# Patient Record
Sex: MALE | Race: WHITE | Employment: OTHER | URBAN - METROPOLITAN AREA
[De-identification: names, ages, dates, MRNs, and addresses within clinical notes are randomized per-mention and may not be internally consistent; named-entity substitution may affect disease eponyms.]

---

## 2021-11-15 ENCOUNTER — HOSPITAL ENCOUNTER (INPATIENT)
Age: 81
LOS: 4 days | Discharge: NURSING, REHAB OR LONG-TERM CARE | DRG: 280 | End: 2021-11-19
Attending: EMERGENCY MEDICINE | Admitting: FAMILY MEDICINE
Payer: MEDICARE

## 2021-11-15 ENCOUNTER — APPOINTMENT (OUTPATIENT)
Dept: GENERAL RADIOLOGY | Age: 81
DRG: 280 | End: 2021-11-15
Attending: EMERGENCY MEDICINE
Payer: MEDICARE

## 2021-11-15 DIAGNOSIS — I50.810 RIGHT-SIDED CONGESTIVE HEART FAILURE, UNSPECIFIED HF CHRONICITY (HCC): Primary | ICD-10-CM

## 2021-11-15 DIAGNOSIS — J18.9 PNEUMONIA DUE TO INFECTIOUS ORGANISM, UNSPECIFIED LATERALITY, UNSPECIFIED PART OF LUNG: ICD-10-CM

## 2021-11-15 PROBLEM — I50.9 CHF (CONGESTIVE HEART FAILURE) (HCC): Status: ACTIVE | Noted: 2021-11-15

## 2021-11-15 LAB
ALBUMIN SERPL-MCNC: 3.4 G/DL (ref 3.5–5)
ALBUMIN/GLOB SERPL: 1 {RATIO} (ref 1.1–2.2)
ALP SERPL-CCNC: 85 U/L (ref 45–117)
ALT SERPL-CCNC: 14 U/L (ref 12–78)
ANION GAP SERPL CALC-SCNC: 6 MMOL/L (ref 5–15)
ANION GAP SERPL CALC-SCNC: 6 MMOL/L (ref 5–15)
APPEARANCE UR: CLEAR
AST SERPL W P-5'-P-CCNC: 11 U/L (ref 15–37)
ATRIAL RATE: 108 BPM
BACTERIA URNS QL MICRO: NEGATIVE /HPF
BASOPHILS # BLD: 0 K/UL (ref 0–0.1)
BASOPHILS NFR BLD: 0 % (ref 0–1)
BILIRUB SERPL-MCNC: 0.5 MG/DL (ref 0.2–1)
BILIRUB UR QL: NEGATIVE
BNP SERPL-MCNC: 3954 PG/ML
BUN SERPL-MCNC: 13 MG/DL (ref 6–20)
BUN SERPL-MCNC: 15 MG/DL (ref 6–20)
BUN/CREAT SERPL: 11 (ref 12–20)
BUN/CREAT SERPL: 14 (ref 12–20)
CA-I BLD-MCNC: 8.6 MG/DL (ref 8.5–10.1)
CA-I BLD-MCNC: 8.7 MG/DL (ref 8.5–10.1)
CALCULATED R AXIS, ECG10: 67 DEGREES
CALCULATED T AXIS, ECG11: 54 DEGREES
CHLORIDE SERPL-SCNC: 105 MMOL/L (ref 97–108)
CHLORIDE SERPL-SCNC: 113 MMOL/L (ref 97–108)
CK SERPL-CCNC: 59 NG/ML (ref 39–308)
CO2 SERPL-SCNC: 25 MMOL/L (ref 21–32)
CO2 SERPL-SCNC: 28 MMOL/L (ref 21–32)
COLOR UR: ABNORMAL
COVID-19 RAPID TEST, COVR: NOT DETECTED
CREAT SERPL-MCNC: 1.05 MG/DL (ref 0.7–1.3)
CREAT SERPL-MCNC: 1.17 MG/DL (ref 0.7–1.3)
DIAGNOSIS, 93000: NORMAL
DIFFERENTIAL METHOD BLD: ABNORMAL
EOSINOPHIL # BLD: 0 K/UL (ref 0–0.4)
EOSINOPHIL NFR BLD: 0 % (ref 0–7)
ERYTHROCYTE [DISTWIDTH] IN BLOOD BY AUTOMATED COUNT: 19.8 % (ref 11.5–14.5)
GLOBULIN SER CALC-MCNC: 3.4 G/DL (ref 2–4)
GLUCOSE SERPL-MCNC: 128 MG/DL (ref 65–100)
GLUCOSE SERPL-MCNC: 202 MG/DL (ref 65–100)
GLUCOSE UR STRIP.AUTO-MCNC: NEGATIVE MG/DL
HCT VFR BLD AUTO: 26 % (ref 36.6–50.3)
HGB BLD-MCNC: 7.1 G/DL (ref 12.1–17)
HGB UR QL STRIP: NEGATIVE
HYALINE CASTS URNS QL MICRO: ABNORMAL /LPF (ref 0–5)
IMM GRANULOCYTES # BLD AUTO: 0.1 K/UL (ref 0–0.04)
IMM GRANULOCYTES NFR BLD AUTO: 1 % (ref 0–0.5)
KETONES UR QL STRIP.AUTO: NEGATIVE MG/DL
LACTATE SERPL-SCNC: 1.4 MMOL/L (ref 0.4–2)
LEUKOCYTE ESTERASE UR QL STRIP.AUTO: NEGATIVE
LYMPHOCYTES # BLD: 0.5 K/UL (ref 0.8–3.5)
LYMPHOCYTES NFR BLD: 4 % (ref 12–49)
MCH RBC QN AUTO: 19.5 PG (ref 26–34)
MCHC RBC AUTO-ENTMCNC: 27.3 G/DL (ref 30–36.5)
MCV RBC AUTO: 71.2 FL (ref 80–99)
MONOCYTES # BLD: 1 K/UL (ref 0–1)
MONOCYTES NFR BLD: 7 % (ref 5–13)
NEUTS SEG # BLD: 12 K/UL (ref 1.8–8)
NEUTS SEG NFR BLD: 88 % (ref 32–75)
NITRITE UR QL STRIP.AUTO: NEGATIVE
NRBC # BLD: 0 K/UL (ref 0–0.01)
NRBC BLD-RTO: 0 PER 100 WBC
PH UR STRIP: 5 [PH] (ref 5–8)
PLATELET # BLD AUTO: 405 K/UL (ref 150–400)
PMV BLD AUTO: 11.6 FL (ref 8.9–12.9)
POTASSIUM SERPL-SCNC: 3.5 MMOL/L (ref 3.5–5.1)
POTASSIUM SERPL-SCNC: 4.2 MMOL/L (ref 3.5–5.1)
PROT SERPL-MCNC: 6.8 G/DL (ref 6.4–8.2)
PROT UR STRIP-MCNC: 30 MG/DL
Q-T INTERVAL, ECG07: 308 MS
QRS DURATION, ECG06: 90 MS
QTC CALCULATION (BEZET), ECG08: 395 MS
RBC # BLD AUTO: 3.65 M/UL (ref 4.1–5.7)
RBC #/AREA URNS HPF: ABNORMAL /HPF (ref 0–5)
SODIUM SERPL-SCNC: 139 MMOL/L (ref 136–145)
SODIUM SERPL-SCNC: 144 MMOL/L (ref 136–145)
SP GR UR REFRACTOMETRY: 1.02 (ref 1–1.03)
SPECIMEN SOURCE: NORMAL
TROPONIN-HIGH SENSITIVITY: 147 NG/L (ref 0–76)
UA: UC IF INDICATED,UAUC: ABNORMAL
UROBILINOGEN UR QL STRIP.AUTO: 2 EU/DL (ref 0.1–1)
VENTRICULAR RATE, ECG03: 99 BPM
WBC # BLD AUTO: 13.6 K/UL (ref 4.1–11.1)
WBC URNS QL MICRO: ABNORMAL /HPF (ref 0–4)

## 2021-11-15 PROCEDURE — 71045 X-RAY EXAM CHEST 1 VIEW: CPT

## 2021-11-15 PROCEDURE — 80048 BASIC METABOLIC PNL TOTAL CA: CPT

## 2021-11-15 PROCEDURE — 99284 EMERGENCY DEPT VISIT MOD MDM: CPT

## 2021-11-15 PROCEDURE — 87040 BLOOD CULTURE FOR BACTERIA: CPT

## 2021-11-15 PROCEDURE — 93005 ELECTROCARDIOGRAM TRACING: CPT

## 2021-11-15 PROCEDURE — 94761 N-INVAS EAR/PLS OXIMETRY MLT: CPT

## 2021-11-15 PROCEDURE — 94640 AIRWAY INHALATION TREATMENT: CPT

## 2021-11-15 PROCEDURE — 74011250637 HC RX REV CODE- 250/637: Performed by: INTERNAL MEDICINE

## 2021-11-15 PROCEDURE — 80053 COMPREHEN METABOLIC PANEL: CPT

## 2021-11-15 PROCEDURE — 81001 URINALYSIS AUTO W/SCOPE: CPT

## 2021-11-15 PROCEDURE — 65270000029 HC RM PRIVATE

## 2021-11-15 PROCEDURE — 83605 ASSAY OF LACTIC ACID: CPT

## 2021-11-15 PROCEDURE — 74011250636 HC RX REV CODE- 250/636: Performed by: FAMILY MEDICINE

## 2021-11-15 PROCEDURE — 84484 ASSAY OF TROPONIN QUANT: CPT

## 2021-11-15 PROCEDURE — 36415 COLL VENOUS BLD VENIPUNCTURE: CPT

## 2021-11-15 PROCEDURE — 74011000250 HC RX REV CODE- 250: Performed by: FAMILY MEDICINE

## 2021-11-15 PROCEDURE — 74011000258 HC RX REV CODE- 258: Performed by: FAMILY MEDICINE

## 2021-11-15 PROCEDURE — 87635 SARS-COV-2 COVID-19 AMP PRB: CPT

## 2021-11-15 PROCEDURE — 85025 COMPLETE CBC W/AUTO DIFF WBC: CPT

## 2021-11-15 PROCEDURE — 74011250637 HC RX REV CODE- 250/637: Performed by: FAMILY MEDICINE

## 2021-11-15 PROCEDURE — 82550 ASSAY OF CK (CPK): CPT

## 2021-11-15 PROCEDURE — 83880 ASSAY OF NATRIURETIC PEPTIDE: CPT

## 2021-11-15 RX ORDER — IPRATROPIUM BROMIDE AND ALBUTEROL SULFATE 2.5; .5 MG/3ML; MG/3ML
3 SOLUTION RESPIRATORY (INHALATION)
Status: DISCONTINUED | OUTPATIENT
Start: 2021-11-15 | End: 2021-11-15 | Stop reason: CLARIF

## 2021-11-15 RX ORDER — POLYETHYLENE GLYCOL 3350 17 G/17G
17 POWDER, FOR SOLUTION ORAL DAILY PRN
Status: DISCONTINUED | OUTPATIENT
Start: 2021-11-15 | End: 2021-11-19 | Stop reason: HOSPADM

## 2021-11-15 RX ORDER — OMEPRAZOLE 20 MG/1
20 CAPSULE, DELAYED RELEASE ORAL DAILY
COMMUNITY
Start: 2021-09-03 | End: 2021-11-19

## 2021-11-15 RX ORDER — ONDANSETRON 2 MG/ML
4 INJECTION INTRAMUSCULAR; INTRAVENOUS
Status: DISCONTINUED | OUTPATIENT
Start: 2021-11-15 | End: 2021-11-19 | Stop reason: HOSPADM

## 2021-11-15 RX ORDER — FUROSEMIDE 10 MG/ML
40 INJECTION INTRAMUSCULAR; INTRAVENOUS 3 TIMES DAILY
Status: DISCONTINUED | OUTPATIENT
Start: 2021-11-15 | End: 2021-11-15

## 2021-11-15 RX ORDER — FUROSEMIDE 10 MG/ML
40 INJECTION INTRAMUSCULAR; INTRAVENOUS
Status: COMPLETED | OUTPATIENT
Start: 2021-11-15 | End: 2021-11-15

## 2021-11-15 RX ORDER — BUDESONIDE AND FORMOTEROL FUMARATE DIHYDRATE 160; 4.5 UG/1; UG/1
2 AEROSOL RESPIRATORY (INHALATION) 2 TIMES DAILY
Status: DISCONTINUED | OUTPATIENT
Start: 2021-11-15 | End: 2021-11-16

## 2021-11-15 RX ORDER — ACETAMINOPHEN 325 MG/1
650 TABLET ORAL
Status: DISCONTINUED | OUTPATIENT
Start: 2021-11-15 | End: 2021-11-19 | Stop reason: HOSPADM

## 2021-11-15 RX ORDER — TAMSULOSIN HYDROCHLORIDE 0.4 MG/1
1 CAPSULE ORAL DAILY
COMMUNITY
Start: 2021-09-03 | End: 2021-11-19

## 2021-11-15 RX ORDER — TRAZODONE HYDROCHLORIDE 50 MG/1
25 TABLET ORAL
Status: DISCONTINUED | OUTPATIENT
Start: 2021-11-15 | End: 2021-11-19 | Stop reason: HOSPADM

## 2021-11-15 RX ORDER — METOPROLOL SUCCINATE 25 MG/1
25 TABLET, EXTENDED RELEASE ORAL DAILY
Status: DISCONTINUED | OUTPATIENT
Start: 2021-11-16 | End: 2021-11-17

## 2021-11-15 RX ORDER — ONDANSETRON 4 MG/1
4 TABLET, ORALLY DISINTEGRATING ORAL
Status: DISCONTINUED | OUTPATIENT
Start: 2021-11-15 | End: 2021-11-19 | Stop reason: HOSPADM

## 2021-11-15 RX ORDER — GUAIFENESIN 100 MG/5ML
81 LIQUID (ML) ORAL DAILY
Status: DISCONTINUED | OUTPATIENT
Start: 2021-11-16 | End: 2021-11-19 | Stop reason: HOSPADM

## 2021-11-15 RX ORDER — AMLODIPINE BESYLATE 10 MG/1
10 TABLET ORAL DAILY
COMMUNITY
Start: 2021-09-03 | End: 2021-11-19

## 2021-11-15 RX ORDER — CLOPIDOGREL BISULFATE 75 MG/1
75 TABLET ORAL DAILY
COMMUNITY
Start: 2021-09-03 | End: 2021-11-19

## 2021-11-15 RX ORDER — ASPIRIN 325 MG
325 TABLET ORAL
Status: COMPLETED | OUTPATIENT
Start: 2021-11-15 | End: 2021-11-15

## 2021-11-15 RX ORDER — TRAZODONE HYDROCHLORIDE 50 MG/1
1 TABLET ORAL
COMMUNITY
Start: 2021-09-03

## 2021-11-15 RX ORDER — LISINOPRIL 20 MG/1
20 TABLET ORAL DAILY
COMMUNITY
Start: 2021-09-03 | End: 2021-11-19

## 2021-11-15 RX ORDER — FUROSEMIDE 10 MG/ML
40 INJECTION INTRAMUSCULAR; INTRAVENOUS 2 TIMES DAILY
Status: DISCONTINUED | OUTPATIENT
Start: 2021-11-15 | End: 2021-11-17

## 2021-11-15 RX ORDER — ACETAMINOPHEN 650 MG/1
650 SUPPOSITORY RECTAL
Status: DISCONTINUED | OUTPATIENT
Start: 2021-11-15 | End: 2021-11-19 | Stop reason: HOSPADM

## 2021-11-15 RX ORDER — ALBUTEROL SULFATE 90 UG/1
2 AEROSOL, METERED RESPIRATORY (INHALATION)
Status: DISCONTINUED | OUTPATIENT
Start: 2021-11-15 | End: 2021-11-19 | Stop reason: HOSPADM

## 2021-11-15 RX ADMIN — FUROSEMIDE 40 MG: 40 INJECTION, SOLUTION INTRAMUSCULAR; INTRAVENOUS at 12:40

## 2021-11-15 RX ADMIN — METHYLPREDNISOLONE SODIUM SUCCINATE 40 MG: 40 INJECTION, POWDER, FOR SOLUTION INTRAMUSCULAR; INTRAVENOUS at 12:50

## 2021-11-15 RX ADMIN — TRAZODONE HYDROCHLORIDE 25 MG: 50 TABLET ORAL at 22:17

## 2021-11-15 RX ADMIN — IPRATROPIUM BROMIDE AND ALBUTEROL SULFATE 3 ML: .5; 2.5 SOLUTION RESPIRATORY (INHALATION) at 15:39

## 2021-11-15 RX ADMIN — PIPERACILLIN SODIUM AND TAZOBACTAM SODIUM 3.38 G: 3; .375 INJECTION, POWDER, LYOPHILIZED, FOR SOLUTION INTRAVENOUS at 12:40

## 2021-11-15 RX ADMIN — NITROGLYCERIN 1 INCH: 20 OINTMENT TOPICAL at 12:41

## 2021-11-15 RX ADMIN — CEFTRIAXONE 1 G: 1 INJECTION, POWDER, FOR SOLUTION INTRAMUSCULAR; INTRAVENOUS at 12:40

## 2021-11-15 RX ADMIN — BUDESONIDE AND FORMOTEROL FUMARATE DIHYDRATE 2 PUFF: 160; 4.5 AEROSOL RESPIRATORY (INHALATION) at 21:00

## 2021-11-15 RX ADMIN — FUROSEMIDE 40 MG: 10 INJECTION, SOLUTION INTRAVENOUS at 22:18

## 2021-11-15 RX ADMIN — BUDESONIDE AND FORMOTEROL FUMARATE DIHYDRATE 2 PUFF: 160; 4.5 AEROSOL RESPIRATORY (INHALATION) at 15:39

## 2021-11-15 RX ADMIN — PIPERACILLIN SODIUM AND TAZOBACTAM SODIUM 3.38 G: 3; .375 INJECTION, POWDER, LYOPHILIZED, FOR SOLUTION INTRAVENOUS at 22:17

## 2021-11-15 RX ADMIN — ASPIRIN 325 MG ORAL TABLET 325 MG: 325 PILL ORAL at 12:41

## 2021-11-15 NOTE — H&P
History and Physical    Subjective:     Shin Levy, [de-identified] y.o. male with a past medical history significant weak heart presents to the ED with chief complaint of Shortness of Breath  . 81-year-old male has a known weak heart. Does not follow with cardiology. Says he has been having worsening shortness of breath has been progressive over the last 1 to 2 weeks. Occasional coughing which she normally has. Unsure about any fevers. Is feeling weaker. No new swelling. No nausea vomiting diarrhea. Some breathing that is been bothering him. Not related to position or exertion. Social History     Tobacco Use    Smoking status: 2packs per day since he was 7     Smokeless tobacco: Not on file   Substance Use Topics    Alcohol use: 40oz beer daily, sometimes liquor too        Prior to Admission medications    Not on File     No Known Allergies       Objective: Intake and Output:    No intake/output data recorded. No intake/output data recorded. Physical Exam:   Visit Vitals  BP (!) 151/66 (BP 1 Location: Left upper arm, BP Patient Position: At rest)   Pulse (!) 104   Temp 97.8 °F (36.6 °C)   Resp 25   Ht 5' 11.5\" (1.816 m)   Wt 74.8 kg (165 lb)   SpO2 100%   BMI 22.69 kg/m²     General:  Alert, cooperative, slight difficult breathing, appears stated age. Head:  Normocephalic, without obvious abnormality, atraumatic. Eyes:  Conjunctivae/corneas clear. PERRL, EOMs intact. Fundi benign   Ears:  Normal TMs and external ear canals both ears. Nose: Nares normal. Septum midline. Mucosa normal. No drainage or sinus tenderness. Throat: Lips, mucosa, and tongue normal. Teeth and gums normal.   Neck: Supple, symmetrical, trachea midline, no adenopathy, thyroid: no enlargement/tenderness/nodules, no carotid bruit and no JVD. Back:   Symmetric, no curvature. ROM normal. No CVA tenderness. Lungs:   Rales and rhonchi, coughing on exam    Chest wall:  No tenderness or deformity.    Heart: Tachycardic and normal rhythm, S1, S2 normal, no murmur, click, rub or gallop. Abdomen:   Soft, non-tender. Bowel sounds normal. No masses,  No organomegaly. Genitalia:  Normal male without lesion, discharge or tenderness. Rectal:  Normal tone, normal prostate, no masses or tenderness  Guaiac negative stool. Extremities: Extremities normal, atraumatic, no cyanosis or edema. Pulses: 2+ and symmetric all extremities. Skin: Skin color, texture, turgor normal. No rashes or lesions   Lymph nodes: Cervical, supraclavicular, and axillary nodes normal.   Neurologic: CNII-XII intact. Normal strength, sensation and reflexes throughout. ECG:  Atrial fibrillation. Nonspecific T wave abnormality      Data Review:   Recent Results (from the past 24 hour(s))   EKG, 12 LEAD, INITIAL    Collection Time: 11/15/21  9:33 AM   Result Value Ref Range    Ventricular Rate 99 BPM    Atrial Rate 108 BPM    QRS Duration 90 ms    Q-T Interval 308 ms    QTC Calculation (Bezet) 395 ms    Calculated R Axis 67 degrees    Calculated T Axis 54 degrees    Diagnosis       Atrial fibrillation  Nonspecific T wave abnormality  Abnormal ECG  No previous ECGs available  Confirmed by Kristofer RUIZ MD ([id number]) on 11/15/2021 11:18:07 AM     CBC WITH AUTOMATED DIFF    Collection Time: 11/15/21  9:35 AM   Result Value Ref Range    WBC 13.6 (H) 4.1 - 11.1 K/uL    RBC 3.65 (L) 4.10 - 5.70 M/uL    HGB 7.1 (L) 12.1 - 17.0 g/dL    HCT 26.0 (L) 36.6 - 50.3 %    MCV 71.2 (L) 80.0 - 99.0 FL    MCH 19.5 (L) 26.0 - 34.0 PG    MCHC 27.3 (L) 30.0 - 36.5 g/dL    RDW 19.8 (H) 11.5 - 14.5 %    PLATELET 808 (H) 563 - 400 K/uL    MPV 11.6 8.9 - 12.9 FL    NRBC 0.0 0.0  WBC    ABSOLUTE NRBC 0.00 0.00 - 0.01 K/uL    NEUTROPHILS 88 (H) 32 - 75 %    LYMPHOCYTES 4 (L) 12 - 49 %    MONOCYTES 7 5 - 13 %    EOSINOPHILS 0 0 - 7 %    BASOPHILS 0 0 - 1 %    IMMATURE GRANULOCYTES 1 (H) 0 - 0.5 %    ABS. NEUTROPHILS 12.0 (H) 1.8 - 8.0 K/UL    ABS.  LYMPHOCYTES 0.5 (L) 0.8 - 3.5 K/UL    ABS. MONOCYTES 1.0 0.0 - 1.0 K/UL    ABS. EOSINOPHILS 0.0 0.0 - 0.4 K/UL    ABS. BASOPHILS 0.0 0.0 - 0.1 K/UL    ABS. IMM. GRANS. 0.1 (H) 0.00 - 0.04 K/UL    DF AUTOMATED     METABOLIC PANEL, COMPREHENSIVE    Collection Time: 11/15/21  9:35 AM   Result Value Ref Range    Sodium 144 136 - 145 mmol/L    Potassium 4.2 3.5 - 5.1 mmol/L    Chloride 113 (H) 97 - 108 mmol/L    CO2 25 21 - 32 mmol/L    Anion gap 6 5 - 15 mmol/L    Glucose 128 (H) 65 - 100 mg/dL    BUN 15 6 - 20 mg/dL    Creatinine 1.05 0.70 - 1.30 mg/dL    BUN/Creatinine ratio 14 12 - 20      GFR est AA >60 >60 ml/min/1.73m2    GFR est non-AA >60 >60 ml/min/1.73m2    Calcium 8.7 8.5 - 10.1 mg/dL    Bilirubin, total 0.5 0.2 - 1.0 mg/dL    AST (SGOT) 11 (L) 15 - 37 U/L    ALT (SGPT) 14 12 - 78 U/L    Alk.  phosphatase 85 45 - 117 U/L    Protein, total 6.8 6.4 - 8.2 g/dL    Albumin 3.4 (L) 3.5 - 5.0 g/dL    Globulin 3.4 2.0 - 4.0 g/dL    A-G Ratio 1.0 (L) 1.1 - 2.2     CK W/ REFLX CKMB    Collection Time: 11/15/21  9:35 AM   Result Value Ref Range    CK 59.0 39 - 308 ng/mL   TROPONIN-HIGH SENSITIVITY    Collection Time: 11/15/21  9:35 AM   Result Value Ref Range    Troponin-High Sensitivity 147 (HH) 0 - 76 ng/L   NT-PRO BNP    Collection Time: 11/15/21  9:35 AM   Result Value Ref Range    NT pro-BNP 3,954 (H) <450 pg/mL   URINALYSIS W/ REFLEX CULTURE    Collection Time: 11/15/21  9:35 AM    Specimen: Urine   Result Value Ref Range    Color Yellow/Straw      Appearance Clear Clear      Specific gravity 1.016 1.003 - 1.030      pH (UA) 5.0 5.0 - 8.0      Protein 30 (A) Negative mg/dL    Glucose Negative Negative mg/dL    Ketone Negative Negative mg/dL    Bilirubin Negative Negative      Blood Negative Negative      Urobilinogen 2.0 (H) 0.1 - 1.0 EU/dL    Nitrites Negative Negative      Leukocyte Esterase Negative Negative      WBC 0-4 0 - 4 /hpf    RBC 0-5 0 - 5 /hpf    Bacteria Negative Negative /hpf    UA:UC IF INDICATED Culture not indicated by UA result Culture not indicated by UA result      Hyaline cast 0-2 0 - 5 /lpf   LACTIC ACID    Collection Time: 11/15/21 12:00 PM   Result Value Ref Range    Lactic acid 1.4 0.4 - 2.0 mmol/L       Chest x-ray 1. Bilateral pleural effusions with bilateral airspace disease or edema. 2. Multiple right-sided rib fractures, probably old, recommend follow-up as  warranted. Assessment/plan:   NSTEMI    Congestive heart failure  - CXR:  Bilateral pleural effusions with bilateral airspace disease or edema. Multiple right-sided rib fractures, probably old, recommend follow-up as warranted. PNA due to infectious organism  - elevated WBC  - start on abx     hgb at 7.1   - transfuse if goes below 7    EKG: Atrial fibrillation.  Nonspecific T wave abnormality     HTN    Medications:    Current Facility-Administered Medications:     cefTRIAXone (ROCEPHIN) 1 g in sterile water (preservative free) 10 mL IV syringe, 1 g, IntraVENous, NOW, Wendy Apodaca MD, 1 g at 11/15/21 1240    acetaminophen (TYLENOL) tablet 650 mg, 650 mg, Oral, Q6H PRN **OR** acetaminophen (TYLENOL) suppository 650 mg, 650 mg, Rectal, Q6H PRN, Hitesh Apodaca MD    polyethylene glycol (MIRALAX) packet 17 g, 17 g, Oral, DAILY PRN, Hitesh Apodaca MD    ondansetron (ZOFRAN ODT) tablet 4 mg, 4 mg, Oral, Q8H PRN **OR** ondansetron (ZOFRAN) injection 4 mg, 4 mg, IntraVENous, Q6H PRN, Wendy Apodaca MD    albuterol-ipratropium (DUO-NEB) 2.5 MG-0.5 MG/3 ML, 3 mL, Nebulization, Q6H RT, Hitesh Apodaca MD    methylPREDNISolone (PF) (SOLU-MEDROL) injection 40 mg, 40 mg, IntraVENous, Q6H, Wendy Apodaca MD    piperacillin-tazobactam (ZOSYN) 3.375 g in 0.9% sodium chloride (MBP/ADV) 100 mL MBP, 3.375 g, IntraVENous, Q8H, Wenyd Apodaca MD    budesonide-formoteroL (SYMBICORT) 160-4.5 mcg/actuation HFA inhaler 2 Puff, 2 Puff, Inhalation, BID, Wendy Apodaca MD    furosemide (LASIX) injection 40 mg, 40 mg, IntraVENous, BID, Constanza Gretel Lundborg, MD  No current outpatient medications on file.      Unable to start any anticoagulation due to anemia hemoglobin 7.1  Start on IV Lasix 40 twice daily IV Solu-Medrol nebulizer treatment IV Zosyn cardiology pulmonary consult    Need home medication not available at this time    Order HIWOT

## 2021-11-15 NOTE — CONSULTS
Consult    Patient: Galo Nance MRN: [id number]  SSN: [id number]    YOB: 1940  Age: [de-identified] y.o. Sex: male      Subjective:      Galo Nance is a [de-identified] y.o. male who is being seen for atrial fibrillation. Patient with history of coronary disease status post PCI, dilated cardiomyopathy, atrial fibrillation, alcohol abuse, smoking 2 packs every day. He presented to the emergency room complaining of sharp chest pain, none exertional, nonradiating, without nausea, vomiting or excessive sweating. Anyhow he has been getting more short winded. Denied recent change in his weight or lower extremity swelling. Denied any fever or chills. He has been noticing more palpitation. Occasionally gets dizzy but no passing out. No past medical history on file. No past surgical history on file. No family history on file.   Social History     Tobacco Use    Smoking status: Not on file    Smokeless tobacco: Not on file   Substance Use Topics    Alcohol use: Not on file      Current Facility-Administered Medications   Medication Dose Route Frequency Provider Last Rate Last Admin    acetaminophen (TYLENOL) tablet 650 mg  650 mg Oral Q6H PRN Neftali Apodaca MD        Or   [facility] acetaminophen (TYLENOL) suppository 650 mg  650 mg Rectal Q6H PRN Neftali Apodaca MD        polyethylene glycol (MIRALAX) packet 17 g  17 g Oral DAILY PRN Neftali Apodaca MD        ondansetron (ZOFRAN ODT) tablet 4 mg  4 mg Oral Q8H PRN Neftali Apodaca MD        Or    ondansetron [facility]) injection 4 mg  4 mg IntraVENous Q6H PRN Neftali Apodaca MD        albuterol-ipratropium (DUO-NEB) 2.5 MG-0.5 MG/3 ML  3 mL Nebulization Q6H RT Wendy Apodaca MD   3 mL at 11/15/21 1539    methylPREDNISolone (PF) (SOLU-MEDROL) injection 40 mg  40 mg IntraVENous Q6H Wendy Apodaca MD   40 mg at 11/15/21 1250    piperacillin-tazobactam (ZOSYN) 3.375 g in 0.9% sodium chloride (MBP/ADV) 100 mL MBP  3.375 g IntraVENous Q8H Constanza David Gibson  mL/hr at 11/15/21 1240 3.375 g at 11/15/21 1240    budesonide-formoteroL (SYMBICORT) 160-4.5 mcg/actuation HFA inhaler 2 Puff  2 Puff Inhalation BID Wendy Apodaca MD   2 Puff at 11/15/21 1539    furosemide (LASIX) injection 40 mg  40 mg IntraVENous BID Romelia Hutchins MD            No Known Allergies    Review of Systems:  A comprehensive review of systems was negative except for that written in the History of Present Illness. Objective:     Vitals:    11/15/21 0922   BP: (!) 151/66   Pulse: (!) 104   Resp: 25   Temp: 97.8 °F (36.6 °C)   SpO2: 100%   Weight: 74.8 kg (165 lb)   Height: 5' 11.5\" (1.816 m)        Physical Exam:  General:  Alert, cooperative, no distress, appears stated age. Eyes:  Conjunctivae/corneas clear. PERRL, EOMs intact. Fundi benign   Ears:  Normal TMs and external ear canals both ears. Nose: Nares normal. Septum midline. Mucosa normal. No drainage or sinus tenderness. Mouth/Throat: Lips, mucosa, and tongue normal. Teeth and gums normal.   Neck: Supple, symmetrical, trachea midline, no adenopathy, thyroid: no enlargment/tenderness/nodules, no carotid bruit and no JVD. Back:   Symmetric, no curvature. ROM normal. No CVA tenderness. Lungs:   Clear to auscultation bilaterally. Heart:   Irregular, variable S1 with normal S2. Abdomen:   Soft, non-tender. Bowel sounds normal. No masses,  No organomegaly. Extremities: Extremities normal, atraumatic, no cyanosis or edema. Pulses: 2+ and symmetric all extremities. Skin: Skin color, texture, turgor normal. No rashes or lesions   Lymph nodes: Cervical, supraclavicular, and axillary nodes normal.   Neurologic: CNII-XII intact. Normal strength, sensation and reflexes throughout.          Assessment:     Hospital Problems  Never Reviewed          Codes Class Noted POA    CHF (congestive heart failure) ([facility] ICD-10-CM: I50.9  ICD-9-CM: 428.0  11/15/2021 Unknown        PNA (pneumonia) ICD-10-CM: J18.9  ICD-9-CM: 562 11/15/2021 Unknown            Patient is an 81-year-old white male with:  1. Atrial fibrillation with rapid ventricular rate  2. Alcohol abuse  3. Nicotine dependence  4. Coronary artery status post PCI  5. Hyperlipidemia  6. Frequent falls  7. Heart failure with reduced ejection fraction  8. Anemia  Plan:     Patient appears to be anemic. His hemoglobin of 7.1. Platelet 862. White count 13.6. His glucose 128, creatinine 1.05, BUN of 15. Troponin were mildly elevated. This is probably secondary to demand ischemia from A. fib with RVR and anemia. We will transfuse 1 unit of blood. CPK was low. BNP was elevated. EKG showed atrial fibrillation with nonspecific ST-T wave changes. We will check fecal for occult blood. Currently on IV Lasix twice a day. We will monitor his ins and outs and daily weight and kidney function closely. I will get ahead and start him on metoprolol for rate control. We will check an echocardiogram  We will have him on aspirin 81 mg daily for history of CAD. Patient was counseled cutting back on his alcohol use. He was counseled regarding the importance of smoking cessation. Thank you  For involving me in Mr. [place] rene. I will follow.   Signed By: Mickie Mahoney MD     November 15, 2021

## 2021-11-15 NOTE — Clinical Note
Status[de-identified] INPATIENT [101]   Type of Bed: Remote Telemetry [29]   Cardiac Monitoring Required?: Yes   Inpatient Hospitalization Certified Necessary for the Following Reasons: 3.  Patient receiving treatment that can only be provided in an inpatient setting (further clarification in H&P documentation)   Admitting Diagnosis: [facility]congestive [facility] failure) [facility] [[id number]]   Admitting Diagnosis: PNA (pneumonia) [[id number]]   Admitting Physician: Freda Mariee [[id number]]   Attending Physician: Freda Mariee [[id number]]   Estimated Length of Stay: 3-4 Midnights   Discharge Plan[de-identified] Home with Office Follow-up

## 2021-11-15 NOTE — CONSULTS
PULMONARY CONSULT  [facility]    Name: Aleisha Gambino MRN: [id number]   : 1940 Hospital: [address] [place] [address]   Date: 11/15/2021  Admission date: 11/15/2021 Hospital Day: 1       HPI:     Hospital Problems  Never Reviewed          Codes Class Noted POA    CHF (congestive heart failure) ([facility] ICD-10-CM: I50.9  ICD-9-CM: 428.0  11/15/2021 Unknown        PNA (pneumonia) ICD-10-CM: J18.9  ICD-9-CM: 486  11/15/2021 Unknown                   [x] High complexity decision making was performed  [x] See my orders for details      Subjective/Initial History:     I was asked by Horace Spatz, MD to see Aleisha Gambino  a [de-identified] y.o.    male in consultation     Excerpts from admission 11/15/2021 or consult notes as follows:   41-year-old male came in because of shortness of breath and dyspnea was found to be in atrial fibrillation, he smokes 2 packs/day has been smoking since the age of 7 denies any history of chest pain but complaining about shortness of breath and dyspnea for the past 1 to 2 weeks x-ray was done which shows bilateral pleural effusion and some infiltrate and history of multiple right-sided rib fracture probably old so admitted and pulmonary consult was called for further evaluation      No Known Allergies     MAR reviewed and pertinent medications noted or modified as needed     Current Facility-Administered Medications   Medication    acetaminophen (TYLENOL) tablet 650 mg    Or    acetaminophen (TYLENOL) suppository 650 mg    polyethylene glycol (MIRALAX) packet 17 g    ondansetron (ZOFRAN ODT) tablet 4 mg    Or    ondansetron (ZOFRAN) injection 4 mg    albuterol-ipratropium (DUO-NEB) 2.5 MG-0.5 MG/3 ML    methylPREDNISolone (PF) (SOLU-MEDROL) injection 40 mg    piperacillin-tazobactam (ZOSYN) 3.375 g in 0.9% sodium chloride (MBP/ADV) 100 mL MBP    budesonide-formoteroL (SYMBICORT) 160-4.5 mcg/actuation HFA inhaler 2 Puff    furosemide (LASIX) injection 40 mg    [START ON 2021] aspirin chewable tablet 81 mg    [START ON 2021] metoprolol succinate (TOPROL-XL) XL tablet 25 mg     No current outpatient medications on file. Patient PCP: Alfonzo Ng MD  PMH:  has no past medical history on file. PSH:   has no past surgical history on file. FHX: family history is not on file. SHX:       ROS:    Review of Systems   Constitutional: Positive for malaise/fatigue. HENT: Negative. Eyes: Negative. Respiratory: Positive for cough, sputum production and wheezing. Cardiovascular: Negative. Gastrointestinal: Negative. Genitourinary: Negative. Musculoskeletal: Negative. Skin: Negative. Neurological: Negative. Psychiatric/Behavioral: Negative. Objective:     Vital Signs: Telemetry:    AFIB Intake/Output:   Visit Vitals  BP (!) 151/66 (BP 1 Location: Left upper arm, BP Patient Position: At rest)   Pulse (!) 104   Temp 97.8 °F (36.6 °C)   Resp 25   Ht 5' 11.5\" (1.816 m)   Wt 74.8 kg (165 lb)   SpO2 100%   BMI 22.69 kg/m²       Temp (24hrs), Av.8 °F (36.6 °C), Min:97.8 °F (36.6 °C), Max:97.8 °F (36.6 °C)        O2 Device: Nasal cannula O2 Flow Rate (L/min): 4 l/min       Wt Readings from Last 4 Encounters:   11/15/21 74.8 kg (165 lb)        No intake or output data in the 24 hours ending 11/15/21 1646    Last shift:      No intake/output data recorded. Last 3 shifts: No intake/output data recorded. Physical Exam:     Physical Exam  Constitutional:       Appearance: Normal appearance. HENT:      Head: Normocephalic and atraumatic. Nose: Nose normal.      Mouth/Throat:      Mouth: Mucous membranes are moist.   Eyes:      Pupils: Pupils are equal, round, and reactive to light. Cardiovascular:      Rate and Rhythm: Normal rate. Rhythm irregular. Pulses: Normal pulses. Heart sounds: Normal heart sounds. Pulmonary:      Effort: Pulmonary effort is normal.      Breath sounds: Wheezing present. Abdominal:      General: Abdomen is flat. Bowel sounds are normal.   Musculoskeletal:         General: Normal range of motion. Cervical back: Normal range of motion and neck supple. Skin:     General: Skin is warm. Neurological:      Mental Status: He is alert. Labs:    Recent Labs     11/15/21  0935   WBC 13.6*   HGB 7.1*   *     Recent Labs     11/15/21  1200 11/15/21  0935   NA  --  144   K  --  4.2   CL  --  113*   CO2  --  25   GLU  --  128*   BUN  --  15   CREA  --  1.05   CA  --  8.7   LAC 1.4  --    ALB  --  3.4*   ALT  --  14     No results for input(s): PH, PCO2, PO2, HCO3, FIO2 in the last 72 hours. No results for input(s): CPK, CKNDX, TROIQ in the last 72 hours. No lab exists for component: CPKMB  No results found for: BNPP, BNP   No results found for: CULTNo results found for: TSH, TSHEXT    Imaging:    CXR Results  (Last 48 hours)               11/15/21 1006  XR CHEST SNGL V Final result    Impression:  1. Bilateral pleural effusions with bilateral airspace disease or edema. 2. Multiple right-sided rib fractures, probably old, recommend follow-up as   warranted. Narrative:  Portable upright radiograph chest 10:02 a.m.. No prior study. INDICATION: Shortness of breath. Heart size is at the upper limits of normal.. There are bilateral pleural   effusions with bilateral diffuse airspace disease or edema. Multiple right rib   fractures. No pneumothorax. Degenerative changes of the shoulders and spine. Results from [place] encounter on 11/15/21    XR CHEST SNGL V    Narrative  Portable upright radiograph chest 10:02 a.m.. No prior study. INDICATION: Shortness of breath. Heart size is at the upper limits of normal.. There are bilateral pleural  effusions with bilateral diffuse airspace disease or edema. Multiple right rib  fractures. No pneumothorax. Degenerative changes of the shoulders and spine. Impression  1.  Bilateral pleural effusions with bilateral airspace disease or edema. 2. Multiple right-sided rib fractures, probably old, recommend follow-up as  warranted. No results found for this or any previous visit. IMPRESSION:   1. Acute hypoxic respiratory failure  2. Chronic Obstructive Pulmonary Disease with Severe Acute Exacerbation requiring inpatient hospitalization and management; has very poor airway clearance. Increased work of breathing  3. Atrial fibrillation with RVR  4. Congestive heart failure rule out cor pulmonale  5. Tobacco abuse  6. Anemia  7. Pt is requiring Drug therapy requiring intensive monitoring for toxicity  8. Pt is unstable, unpredictable needing inpatient monitoring; is acutely ill and at high risk of sudden decline and decompensation with severe consequenses and continued end organ dysfunction and failure  9. Prognosis guarded       RECOMMENDATIONS/PLAN:     1. Will start patient on oxygen via nasal cannula and patient is declining to use oxygen at home started him on inhalers but he does not want to use inhalers when discharged from here  2. Now is in A. fib with RVR cardiology on case  3. Chest x-ray shows most likely congestive heart failure  4. Anemia hemoglobin 7.1  5. Intubate and place on vent if NIV fails  6. Agree with Empiric IV antibiotics pending culture results   7. Follow culture results  8. Supplemental O2 to keep sats > 93%  9. Aspiration precautions  10. Labs to follow electrolytes, renal function and and blood counts  11. Glucose monitoring and SSI  12. Bronchial hygiene with respiratory therapy techniques, bronchodilators  13. DVT, SUP prophylaxis  14. Smoking cessation counseling done  15. Pt needs IV fluids with additives and Drug therapy requiring intensive monitoring for toxicity  16.  Prescription drug management with home med reconciliation reviewed       This care involved high complexity medical decision making: I personally:  · Reviewed the flowsheet and previous days notes  · Reviewed and summarized records or history from previous days note or discussions with staff, family  · High Risk Drug therapy requiring intensive monitoring for toxicity: eg steroids, pressors, antibiotics  · Reviewed and/or ordered Clinical lab tests  · Reviewed images and/or ordered Radiology tests  · Reviewed the patients ECG / Telemetry  · Reviewed and/or adjusted NiPPV settings  · Called and arranged for Radiologic procedures or interventions  · performed or ordered Diagnostic endoscopies with identified risk factors.   · discussed my assessment/management with : Nursing, Hospitalist and Family for coordination of care          Jordan Ha MD

## 2021-11-15 NOTE — H&P
History and Physical    Subjective:     Glynda Schaumann, [de-identified] y.o. male with a past medical history significant weak heart presents to the ED with chief complaint of Shortness of Breath  . 69-year-old male has a known weak heart. Does not follow with cardiology. Says he has been having worsening shortness of breath has been progressive over the last 1 to 2 weeks. Occasional coughing which she normally has. Unsure about any fevers. Is feeling weaker. No new swelling. No nausea vomiting diarrhea. Some breathing that is been bothering him. Not related to position or exertion. Social History     Tobacco Use    Smoking status: 2packs per day since he was 7     Smokeless tobacco: Not on file   Substance Use Topics    Alcohol use: 40oz beer daily, sometimes liquor too        Prior to Admission medications    Not on File     No Known Allergies       Objective: Intake and Output:    No intake/output data recorded. No intake/output data recorded. Physical Exam:   Visit Vitals  BP (!) 151/66 (BP 1 Location: Left upper arm, BP Patient Position: At rest)   Pulse (!) 104   Temp 97.8 °F (36.6 °C)   Resp 25   Ht 5' 11.5\" (1.816 m)   Wt 74.8 kg (165 lb)   SpO2 100%   BMI 22.69 kg/m²     General:  Alert, cooperative, slight difficult breathing, appears stated age. Head:  Normocephalic, without obvious abnormality, atraumatic. Eyes:  Conjunctivae/corneas clear. PERRL, EOMs intact. Fundi benign   Ears:  Normal TMs and external ear canals both ears. Nose: Nares normal. Septum midline. Mucosa normal. No drainage or sinus tenderness. Throat: Lips, mucosa, and tongue normal. Teeth and gums normal.   Neck: Supple, symmetrical, trachea midline, no adenopathy, thyroid: no enlargement/tenderness/nodules, no carotid bruit and no JVD. Back:   Symmetric, no curvature. ROM normal. No CVA tenderness. Lungs:   Rales and rhonchi, coughing on exam    Chest wall:  No tenderness or deformity.    Heart: Tachycardic and normal rhythm, S1, S2 normal, no murmur, click, rub or gallop. Abdomen:   Soft, non-tender. Bowel sounds normal. No masses,  No organomegaly. Genitalia:  Normal male without lesion, discharge or tenderness. Rectal:  Normal tone, normal prostate, no masses or tenderness  Guaiac negative stool. Extremities: Extremities normal, atraumatic, no cyanosis or edema. Pulses: 2+ and symmetric all extremities. Skin: Skin color, texture, turgor normal. No rashes or lesions   Lymph nodes: Cervical, supraclavicular, and axillary nodes normal.   Neurologic: CNII-XII intact. Normal strength, sensation and reflexes throughout. ECG:  Atrial fibrillation. Nonspecific T wave abnormality      Data Review:   Recent Results (from the past 24 hour(s))   EKG, 12 LEAD, INITIAL    Collection Time: 11/15/21  9:33 AM   Result Value Ref Range    Ventricular Rate 99 BPM    Atrial Rate 108 BPM    QRS Duration 90 ms    Q-T Interval 308 ms    QTC Calculation (Bezet) 395 ms    Calculated R Axis 67 degrees    Calculated T Axis 54 degrees    Diagnosis       Atrial fibrillation  Nonspecific T wave abnormality  Abnormal ECG  No previous ECGs available  Confirmed by JOSEPH GUERRA, Demetrio Shah ([id number]) on 11/15/2021 11:18:07 AM     CBC WITH AUTOMATED DIFF    Collection Time: 11/15/21  9:35 AM   Result Value Ref Range    WBC 13.6 (H) 4.1 - 11.1 K/uL    RBC 3.65 (L) 4.10 - 5.70 M/uL    HGB 7.1 (L) 12.1 - 17.0 g/dL    HCT 26.0 (L) 36.6 - 50.3 %    MCV 71.2 (L) 80.0 - 99.0 FL    MCH 19.5 (L) 26.0 - 34.0 PG    MCHC 27.3 (L) 30.0 - 36.5 g/dL    RDW 19.8 (H) 11.5 - 14.5 %    PLATELET 009 (H) 349 - 400 K/uL    MPV 11.6 8.9 - 12.9 FL    NRBC 0.0 0.0  WBC    ABSOLUTE NRBC 0.00 0.00 - 0.01 K/uL    NEUTROPHILS 88 (H) 32 - 75 %    LYMPHOCYTES 4 (L) 12 - 49 %    MONOCYTES 7 5 - 13 %    EOSINOPHILS 0 0 - 7 %    BASOPHILS 0 0 - 1 %    IMMATURE GRANULOCYTES 1 (H) 0 - 0.5 %    ABS. NEUTROPHILS 12.0 (H) 1.8 - 8.0 K/UL    ABS.  LYMPHOCYTES 0.5 (L) 0.8 - 3.5 K/UL    ABS. MONOCYTES 1.0 0.0 - 1.0 K/UL    ABS. EOSINOPHILS 0.0 0.0 - 0.4 K/UL    ABS. BASOPHILS 0.0 0.0 - 0.1 K/UL    ABS. IMM. GRANS. 0.1 (H) 0.00 - 0.04 K/UL    DF AUTOMATED     METABOLIC PANEL, COMPREHENSIVE    Collection Time: 11/15/21  9:35 AM   Result Value Ref Range    Sodium 144 136 - 145 mmol/L    Potassium 4.2 3.5 - 5.1 mmol/L    Chloride 113 (H) 97 - 108 mmol/L    CO2 25 21 - 32 mmol/L    Anion gap 6 5 - 15 mmol/L    Glucose 128 (H) 65 - 100 mg/dL    BUN 15 6 - 20 mg/dL    Creatinine 1.05 0.70 - 1.30 mg/dL    BUN/Creatinine ratio 14 12 - 20      GFR est AA >60 >60 ml/min/1.73m2    GFR est non-AA >60 >60 ml/min/1.73m2    Calcium 8.7 8.5 - 10.1 mg/dL    Bilirubin, total 0.5 0.2 - 1.0 mg/dL    AST (SGOT) 11 (L) 15 - 37 U/L    ALT (SGPT) 14 12 - 78 U/L    Alk.  phosphatase 85 45 - 117 U/L    Protein, total 6.8 6.4 - 8.2 g/dL    Albumin 3.4 (L) 3.5 - 5.0 g/dL    Globulin 3.4 2.0 - 4.0 g/dL    A-G Ratio 1.0 (L) 1.1 - 2.2     CK W/ REFLX CKMB    Collection Time: 11/15/21  9:35 AM   Result Value Ref Range    CK 59.0 39 - 308 ng/mL   TROPONIN-HIGH SENSITIVITY    Collection Time: 11/15/21  9:35 AM   Result Value Ref Range    Troponin-High Sensitivity 147 (HH) 0 - 76 ng/L   NT-PRO BNP    Collection Time: 11/15/21  9:35 AM   Result Value Ref Range    NT pro-BNP 3,954 (H) <450 pg/mL   URINALYSIS W/ REFLEX CULTURE    Collection Time: 11/15/21  9:35 AM    Specimen: Urine   Result Value Ref Range    Color Yellow/Straw      Appearance Clear Clear      Specific gravity 1.016 1.003 - 1.030      pH (UA) 5.0 5.0 - 8.0      Protein 30 (A) Negative mg/dL    Glucose Negative Negative mg/dL    Ketone Negative Negative mg/dL    Bilirubin Negative Negative      Blood Negative Negative      Urobilinogen 2.0 (H) 0.1 - 1.0 EU/dL    Nitrites Negative Negative      Leukocyte Esterase Negative Negative      WBC 0-4 0 - 4 /hpf    RBC 0-5 0 - 5 /hpf    Bacteria Negative Negative /hpf    UA:UC IF INDICATED Culture not indicated by UA result Culture not indicated by UA result      Hyaline cast 0-2 0 - 5 /lpf       Chest x-ray 1. Bilateral pleural effusions with bilateral airspace disease or edema. 2. Multiple right-sided rib fractures, probably old, recommend follow-up as  warranted. Assessment/plan:   Troponin 147    Right sided CHF  - CXR:  Bilateral pleural effusions with bilateral airspace disease or edema. Multiple right-sided rib fractures, probably old, recommend follow-up as warranted. PNA due to infectious organism  - elevated WBC  - start on abx     hgb at 7.1   - transfuse if goes below 7    EKG: Atrial fibrillation.  Nonspecific T wave abnormality     HTN    Medications:  Aspirin 325mg  ceftriaxone 1g IV  Furosemide 40mg   Nitroglycerin 2% ointment

## 2021-11-15 NOTE — PROGRESS NOTES
Reason for Admission:  CHF                     RUR Score: 11%                  Plan for utilizing home health: None/uses cane. PCP: First and Last name:  Aron Guthrie MD     Name of Practice:    Are you a current patient: Yes/No: Yes   Approximate date of last visit: Seen MD in April. Can you participate in a virtual visit with your PCP: Yes/Call                    Current Advanced Directive/Advance Care Plan: Full Code      Healthcare Decision Maker:   Self                             Transition of Care Plan:   D/C Plan is to return to [facility] @ [phone] & staff will transport pt home upon discharge.

## 2021-11-15 NOTE — PROGRESS NOTES
111/15/21. D/C Plan is to return to [facility] @ [phone] - caregiver/owner Suly Bolden). Pt alert X 4 & gave CM phone number to his caregiver/contact. Pt uses cane & staff will transport pt home upon discharge.

## 2021-11-15 NOTE — ED PROVIDER NOTES
EMERGENCY DEPARTMENT HISTORY AND PHYSICAL EXAM      Date: 11/15/2021  Patient Name: Isauro Mccloud    History of Presenting Illness     Chief Complaint   Patient presents with    Shortness of Breath       History Provided By: Patient    HPI: Isauro Mccloud, [de-identified] y.o. male with a past medical history significant weak heart presents to the ED with chief complaint of Shortness of Breath  . 81-year-old male has a known weak heart. Does not follow with cardiology. Says he has been having worsening shortness of breath has been progressive over the last 1 to 2 weeks. Occasional coughing which she normally has. Unsure about any fevers. Is feeling weaker. No new swelling. No nausea vomiting diarrhea. Some breathing that is been bothering him. Not related to position or exertion. There are no other complaints, changes, or physical findings at this time. PCP: Lea Carmona MD        Past History     Past Medical History:  Weak heart  Past Surgical History:  denies  Family History:  denies  Social History: denies  Social History     Tobacco Use    Smoking status: Not on file    Smokeless tobacco: Not on file   Substance Use Topics    Alcohol use: Not on file    Drug use: Not on file       Allergies:  No Known Allergies      Review of Systems   Review of Systems   Constitutional: Negative. Negative for chills, fatigue and fever. HENT: Negative. Negative for congestion, ear pain, nosebleeds and sore throat. Eyes: Negative. Negative for pain, discharge and visual disturbance. Respiratory: Positive for cough and shortness of breath. Negative for chest tightness. Cardiovascular: Negative. Negative for chest pain and leg swelling. Gastrointestinal: Negative. Negative for abdominal pain, blood in stool, constipation, diarrhea, nausea and vomiting. Endocrine: Negative. Genitourinary: Negative. Negative for difficulty urinating, dysuria and flank pain. Musculoskeletal: Negative. Negative for back pain and myalgias. Skin: Negative. Negative for rash and wound. Allergic/Immunologic: Negative. Neurological: Negative. Negative for dizziness, syncope, weakness, numbness and headaches. Hematological: Negative. Does not bruise/bleed easily. Psychiatric/Behavioral: Negative. Negative for agitation, confusion, hallucinations and suicidal ideas. All other systems reviewed and are negative. Physical Exam   Physical Exam  Vitals and nursing note reviewed. Constitutional:       General: He is not in acute distress. Appearance: He is normal weight. He is not ill-appearing. HENT:      Head: Normocephalic and atraumatic. Right Ear: External ear normal.      Left Ear: External ear normal.      Nose: Nose normal. No rhinorrhea. Mouth/Throat:      Mouth: Mucous membranes are moist.      Pharynx: Oropharynx is clear. Eyes:      Extraocular Movements: Extraocular movements intact. Conjunctiva/sclera: Conjunctivae normal.      Pupils: Pupils are equal, round, and reactive to light. Cardiovascular:      Rate and Rhythm: Normal rate and regular rhythm. Pulses: Normal pulses. Heart sounds: Normal heart sounds. Pulmonary:      Effort: Pulmonary effort is normal. Tachypnea present. No respiratory distress. Breath sounds: Rhonchi and rales present. Abdominal:      General: Abdomen is flat. Bowel sounds are normal.      Palpations: Abdomen is soft. Musculoskeletal:         General: No tenderness or deformity. Normal range of motion. Cervical back: Normal range of motion and neck supple. Skin:     General: Skin is warm and dry. Capillary Refill: Capillary refill takes less than 2 seconds. Findings: No bruising, lesion or rash. Neurological:      General: No focal deficit present. Mental Status: He is alert and oriented to person, place, and time. Mental status is at baseline.    Psychiatric:         Mood and Affect: Mood normal. Behavior: Behavior normal.         Thought Content: Thought content normal.         Judgment: Judgment normal.         Diagnostic Study Results     Labs -     Recent Results (from the past 12 hour(s))   CBC WITH AUTOMATED DIFF    Collection Time: 11/15/21  9:35 AM   Result Value Ref Range    WBC 13.6 (H) 4.1 - 11.1 K/uL    RBC 3.65 (L) 4.10 - 5.70 M/uL    HGB 7.1 (L) 12.1 - 17.0 g/dL    HCT 26.0 (L) 36.6 - 50.3 %    MCV 71.2 (L) 80.0 - 99.0 FL    MCH 19.5 (L) 26.0 - 34.0 PG    MCHC 27.3 (L) 30.0 - 36.5 g/dL    RDW 19.8 (H) 11.5 - 14.5 %    PLATELET 019 (H) 128 - 400 K/uL    MPV 11.6 8.9 - 12.9 FL    NRBC 0.0 0.0  WBC    ABSOLUTE NRBC 0.00 0.00 - 0.01 K/uL    NEUTROPHILS 88 (H) 32 - 75 %    LYMPHOCYTES 4 (L) 12 - 49 %    MONOCYTES 7 5 - 13 %    EOSINOPHILS 0 0 - 7 %    BASOPHILS 0 0 - 1 %    IMMATURE GRANULOCYTES 1 (H) 0 - 0.5 %    ABS. NEUTROPHILS 12.0 (H) 1.8 - 8.0 K/UL    ABS. LYMPHOCYTES 0.5 (L) 0.8 - 3.5 K/UL    ABS. MONOCYTES 1.0 0.0 - 1.0 K/UL    ABS. EOSINOPHILS 0.0 0.0 - 0.4 K/UL    ABS. BASOPHILS 0.0 0.0 - 0.1 K/UL    ABS. IMM. GRANS. 0.1 (H) 0.00 - 0.04 K/UL    DF AUTOMATED     METABOLIC PANEL, COMPREHENSIVE    Collection Time: 11/15/21  9:35 AM   Result Value Ref Range    Sodium 144 136 - 145 mmol/L    Potassium 4.2 3.5 - 5.1 mmol/L    Chloride 113 (H) 97 - 108 mmol/L    CO2 25 21 - 32 mmol/L    Anion gap 6 5 - 15 mmol/L    Glucose 128 (H) 65 - 100 mg/dL    BUN 15 6 - 20 mg/dL    Creatinine 1.05 0.70 - 1.30 mg/dL    BUN/Creatinine ratio 14 12 - 20      GFR est AA >60 >60 ml/min/1.73m2    GFR est non-AA >60 >60 ml/min/1.73m2    Calcium 8.7 8.5 - 10.1 mg/dL    Bilirubin, total 0.5 0.2 - 1.0 mg/dL    AST (SGOT) 11 (L) 15 - 37 U/L    ALT (SGPT) 14 12 - 78 U/L    Alk.  phosphatase 85 45 - 117 U/L    Protein, total 6.8 6.4 - 8.2 g/dL    Albumin 3.4 (L) 3.5 - 5.0 g/dL    Globulin 3.4 2.0 - 4.0 g/dL    A-G Ratio 1.0 (L) 1.1 - 2.2     CK W/ REFLX CKMB    Collection Time: 11/15/21  9:35 AM   Result Value Ref Range    CK 59.0 39 - 308 ng/mL   TROPONIN-HIGH SENSITIVITY    Collection Time: 11/15/21  9:35 AM   Result Value Ref Range    Troponin-High Sensitivity 147 (HH) 0 - 76 ng/L   NT-PRO BNP    Collection Time: 11/15/21  9:35 AM   Result Value Ref Range    NT pro-BNP 3,954 (H) <450 pg/mL   URINALYSIS W/ REFLEX CULTURE    Collection Time: 11/15/21  9:35 AM    Specimen: Urine   Result Value Ref Range    Color Yellow/Straw      Appearance Clear Clear      Specific gravity 1.016 1.003 - 1.030      pH (UA) 5.0 5.0 - 8.0      Protein 30 (A) Negative mg/dL    Glucose Negative Negative mg/dL    Ketone Negative Negative mg/dL    Bilirubin Negative Negative      Blood Negative Negative      Urobilinogen 2.0 (H) 0.1 - 1.0 EU/dL    Nitrites Negative Negative      Leukocyte Esterase Negative Negative      WBC 0-4 0 - 4 /hpf    RBC 0-5 0 - 5 /hpf    Bacteria Negative Negative /hpf    UA:UC IF INDICATED Culture not indicated by UA result Culture not indicated by UA result      Hyaline cast 0-2 0 - 5 /lpf         Radiologic Studies -   XR CHEST SNGL V   Final Result   1. Bilateral pleural effusions with bilateral airspace disease or edema. 2. Multiple right-sided rib fractures, probably old, recommend follow-up as   warranted. CT Results  (Last 48 hours)    None        CXR Results  (Last 48 hours)               11/15/21 1006  XR CHEST SNGL V Final result    Impression:  1. Bilateral pleural effusions with bilateral airspace disease or edema. 2. Multiple right-sided rib fractures, probably old, recommend follow-up as   warranted. Narrative:  Portable upright radiograph chest 10:02 a.m.. No prior study. INDICATION: Shortness of breath. Heart size is at the upper limits of normal.. There are bilateral pleural   effusions with bilateral diffuse airspace disease or edema. Multiple right rib   fractures. No pneumothorax. Degenerative changes of the shoulders and spine.                  Medical Decision Making and ED Course   I am the first provider for this patient. I reviewed the vital signs, available nursing notes, past medical history, past surgical history, family history and social history. Vital Signs-Reviewed the patient's vital signs. Patient Vitals for the past 12 hrs:   Temp Pulse Resp BP SpO2   11/15/21 0922 97.8 °F (36.6 °C) (!) 104 25 (!) 151/66 100 %       EKG interpretation:   EKG at 933. Atrial fibrillation rate of 99. Irregular irregular intervals. No ST changes. No T wave inversions. Reason rule out dysrhythmia. Interpreted by ER physician. Records Reviewed: Previous Hospital chart. EMS run report      ED Course:   Initial assessment performed. The patients presenting problems have been discussed, and they are in agreement with the care plan formulated and outlined with them. I have encouraged them to ask questions as they arise throughout their visit. Orders Placed This Encounter    CULTURE, BLOOD, PAIRED     Standing Status:   Standing     Number of Occurrences:   1    CULTURE, BLOOD     Standing Status:   Standing     Number of Occurrences:   1    COVID-19 RAPID TEST     Standing Status:   Standing     Number of Occurrences:   1     Order Specific Question:   Is this test for diagnosis or screening? Answer:   Diagnosis of ill patient     Order Specific Question:   Symptomatic for COVID-19 as defined by CDC? Answer:   Yes     Order Specific Question:   Date of Symptom Onset     Answer:   11/15/2021     Order Specific Question:   Hospitalized for COVID-19? Answer:   No     Order Specific Question:   Admitted to ICU for COVID-19? Answer:   No     Order Specific Question:   Employed in healthcare setting? Answer:   No     Order Specific Question:   Resident in a congregate (group) care setting? Answer:   No     Order Specific Question:   Previously tested for COVID-19?      Answer:   No    XR CHEST SNGL V     Standing Status:   Standing     Number of Occurrences:   1     Order Specific Question:   Transport     Answer:   BED [2]     Order Specific Question:   Reason for Exam     Answer:   sob    CBC WITH AUTOMATED DIFF     Standing Status:   Standing     Number of Occurrences:   1    METABOLIC PANEL, COMPREHENSIVE     Standing Status:   Standing     Number of Occurrences:   1    CK W/ REFLX CKMB     Standing Status:   Standing     Number of Occurrences:   1    TROPONIN-HIGH SENSITIVITY     Standing Status:   Standing     Number of Occurrences:   1    BNP (NT-PRO)     Standing Status:   Standing     Number of Occurrences:   1    URINALYSIS W/ REFLEX CULTURE     Standing Status:   Standing     Number of Occurrences:   1    LACTIC ACID, PLASMA     If lactic acid level is greater than 2, then a repeat lactic acid level is to be drawn in 6 hours. Standing Status:   Standing     Number of Occurrences:   1    LACTIC ACID, PLASMA     If initial lactic acid level is greater than 2, then a repeat lactic acid level is to be drawn in 6 hours. Standing Status:   Standing     Number of Occurrences:   12255    OXYGEN CANNULA (To maintain O2 sat greater than 92%) Consult MD if Hx. of COPD     Standing Status:   Standing     Number of Occurrences:   1     Order Specific Question:   Liters per minute: Answer:   2     Order Specific Question:   Indications for O2 therapy     Answer:   HYPOXIA    PULSE OXIMETRY SPOT CHECK     Standing Status:   Standing     Number of Occurrences:   1    CARDIAC MONITORING     Standing Status:   Standing     Number of Occurrences:   1     Order Specific Question:   Type:      Answer:   Bedside     Order Specific Question:   Patient may go off unit without monitor     Answer:   No    EKG 12 LEAD INITIAL     Standing Status:   Standing     Number of Occurrences:   1     Order Specific Question:   Reason for Exam:     Answer:   sob    SALINE LOCK IV ONE TIME STAT     Standing Status:   Standing     Number of Occurrences:   1  furosemide (LASIX) injection 40 mg    aspirin tablet 325 mg    nitroglycerin (NITROBID) 2 % ointment 1 Inch    cefTRIAXone (ROCEPHIN) 1 g in sterile water (preservative free) 10 mL IV syringe     Order Specific Question:   Antibiotic Indications     Answer:   Upper Respiratory Infection    INITIAL PHYSICIAN ORDER: INPATIENT Remote Telemetry; Yes; 3. Patient receiving treatment that can only be provided in an inpatient setting (further clarification in H&P documentation)     Standing Status:   Standing     Number of Occurrences:   1     Order Specific Question:   Status: Answer:   INPATIENT [101]     Order Specific Question:   Type of Bed     Answer:   Remote Telemetry [29]     Order Specific Question:   Cardiac Monitoring Required? Answer:   Yes     Order Specific Question:   Inpatient Hospitalization Certified Necessary for the Following Reasons     Answer:   3. Patient receiving treatment that can only be provided in an inpatient setting (further clarification in H&P documentation)     Order Specific Question:   Admitting Diagnosis     Answer:   CHF (congestive heart failure) [facility]) [[id number]]     Order Specific Question:   Admitting Diagnosis     Answer:   PNA (pneumonia) [[id number]]     Order Specific Question:   Admitting Physician     Answer:   Ruben Ballesteros     Order Specific Question:   Attending Physician     Answer:   Ruebn Ballesteros     Order Specific Question:   Estimated Length of Stay     Answer:   3-4 Midnights     Order Specific Question:   Discharge Plan:     Answer:   Home with Office Follow-up                 Provider Notes (Medical Decision Making):   27-year-old male presents with worsening shortness of breath cough congestion. X-ray does suggest pneumonia and CHF. Will admit for diuresis antibiotics. He is doing well on oxygen. Not requiring BiPAP or intubation.   He is tachypneic meeting sepsis      Consults  Dr Jane Gutierrez admit Admitted    Procedures       CRITICAL CARE NOTE : sepsis  11:06 AM  Amount of Critical Care Time: 45 minutes    IMPENDING DETERIORATION -Airway, Respiratory and Cardiovascular  ASSOCIATED RISK FACTORS - Hypoxia  MANAGEMENT- Bedside Assessment and  INTERPRETATION -  Xrays, Blood Gases, ECG and Blood Pressure  INTERVENTIONS - hemodynamic mngmt  CASE REVIEW - Hospitalist/Intensivist  TREATMENT RESPONSE -Improved  PERFORMED BY - Self    NOTES   :  I have spent critical care time involved in lab review, consultations with specialist, family decision- making, bedside attention and documentation. This time excludes time spent in any separate billed procedures. During this entire length of time I was immediately available to the patient . Kiran Golden MD                    Disposition       Emergency Department Disposition:  Admitted      Diagnosis     Clinical Impression:   1. Right-sided congestive heart failure, unspecified HF chronicity ([facility] [address]    2. Pneumonia due to infectious organism, unspecified laterality, unspecified part of lung        Attestations:    Kiran Golden MD    Please note that this dictation was completed with [email or web address], the computer voice recognition software. Quite often unanticipated grammatical, syntax, homophones, and other interpretive errors are inadvertently transcribed by the computer software. Please disregard these errors. Please excuse any errors that have escaped final proofreading. Thank you.

## 2021-11-15 NOTE — ED NOTES
TRANSFER - OUT REPORT:    Verbal report given to Jolene Lange RN(name) on Modesta Bridges  being transferred to [facility](unit) for routine progression of care       Report consisted of patients Situation, Background, Assessment and   Recommendations(SBAR). Information from the following report(s) SBAR was reviewed with the receiving nurse. Lines:   Peripheral IV 11/15/21 Right Antecubital (Active)        Opportunity for questions and clarification was provided.       Patient transported with:   [facility]

## 2021-11-16 ENCOUNTER — APPOINTMENT (OUTPATIENT)
Dept: NON INVASIVE DIAGNOSTICS | Age: 81
DRG: 280 | End: 2021-11-16
Attending: INTERNAL MEDICINE
Payer: MEDICARE

## 2021-11-16 LAB
ALBUMIN SERPL-MCNC: 3.2 G/DL (ref 3.5–5)
ALBUMIN/GLOB SERPL: 1 {RATIO} (ref 1.1–2.2)
ALP SERPL-CCNC: 75 U/L (ref 45–117)
ALT SERPL-CCNC: 14 U/L (ref 12–78)
ANION GAP SERPL CALC-SCNC: 6 MMOL/L (ref 5–15)
AST SERPL W P-5'-P-CCNC: 12 U/L (ref 15–37)
BASOPHILS # BLD: 0 K/UL (ref 0–0.1)
BASOPHILS NFR BLD: 0 % (ref 0–1)
BILIRUB SERPL-MCNC: 0.5 MG/DL (ref 0.2–1)
BNP SERPL-MCNC: 7967 PG/ML
BUN SERPL-MCNC: 19 MG/DL (ref 6–20)
BUN/CREAT SERPL: 16 (ref 12–20)
CA-I BLD-MCNC: 8.6 MG/DL (ref 8.5–10.1)
CHLORIDE SERPL-SCNC: 104 MMOL/L (ref 97–108)
CO2 SERPL-SCNC: 28 MMOL/L (ref 21–32)
CREAT SERPL-MCNC: 1.19 MG/DL (ref 0.7–1.3)
DIFFERENTIAL METHOD BLD: ABNORMAL
EOSINOPHIL # BLD: 0 K/UL (ref 0–0.4)
EOSINOPHIL NFR BLD: 0 % (ref 0–7)
ERYTHROCYTE [DISTWIDTH] IN BLOOD BY AUTOMATED COUNT: 19.4 % (ref 11.5–14.5)
GLOBULIN SER CALC-MCNC: 3.1 G/DL (ref 2–4)
GLUCOSE SERPL-MCNC: 143 MG/DL (ref 65–100)
HCT VFR BLD AUTO: 24 % (ref 36.6–50.3)
HGB BLD-MCNC: 6.8 G/DL (ref 12.1–17)
IMM GRANULOCYTES # BLD AUTO: 0.1 K/UL (ref 0–0.04)
IMM GRANULOCYTES NFR BLD AUTO: 0 % (ref 0–0.5)
LYMPHOCYTES # BLD: 0.5 K/UL (ref 0.8–3.5)
LYMPHOCYTES NFR BLD: 5 % (ref 12–49)
MCH RBC QN AUTO: 19.4 PG (ref 26–34)
MCHC RBC AUTO-ENTMCNC: 28.3 G/DL (ref 30–36.5)
MCV RBC AUTO: 68.6 FL (ref 80–99)
MONOCYTES # BLD: 0.3 K/UL (ref 0–1)
MONOCYTES NFR BLD: 3 % (ref 5–13)
NEUTS SEG # BLD: 10.5 K/UL (ref 1.8–8)
NEUTS SEG NFR BLD: 92 % (ref 32–75)
NRBC # BLD: 0.02 K/UL (ref 0–0.01)
NRBC BLD-RTO: 0.2 PER 100 WBC
PLATELET # BLD AUTO: 390 K/UL (ref 150–400)
PMV BLD AUTO: 10.7 FL (ref 8.9–12.9)
POTASSIUM SERPL-SCNC: 4.1 MMOL/L (ref 3.5–5.1)
PROT SERPL-MCNC: 6.3 G/DL (ref 6.4–8.2)
RBC # BLD AUTO: 3.5 M/UL (ref 4.1–5.7)
SODIUM SERPL-SCNC: 138 MMOL/L (ref 136–145)
WBC # BLD AUTO: 11.4 K/UL (ref 4.1–11.1)

## 2021-11-16 PROCEDURE — 86923 COMPATIBILITY TEST ELECTRIC: CPT

## 2021-11-16 PROCEDURE — 36430 TRANSFUSION BLD/BLD COMPNT: CPT

## 2021-11-16 PROCEDURE — 65270000029 HC RM PRIVATE

## 2021-11-16 PROCEDURE — 74011250636 HC RX REV CODE- 250/636: Performed by: FAMILY MEDICINE

## 2021-11-16 PROCEDURE — 36415 COLL VENOUS BLD VENIPUNCTURE: CPT

## 2021-11-16 PROCEDURE — 30233N1 TRANSFUSION OF NONAUTOLOGOUS RED BLOOD CELLS INTO PERIPHERAL VEIN, PERCUTANEOUS APPROACH: ICD-10-PCS | Performed by: FAMILY MEDICINE

## 2021-11-16 PROCEDURE — 74011250637 HC RX REV CODE- 250/637: Performed by: INTERNAL MEDICINE

## 2021-11-16 PROCEDURE — 83880 ASSAY OF NATRIURETIC PEPTIDE: CPT

## 2021-11-16 PROCEDURE — 74011250637 HC RX REV CODE- 250/637: Performed by: FAMILY MEDICINE

## 2021-11-16 PROCEDURE — 74011250636 HC RX REV CODE- 250/636: Performed by: INTERNAL MEDICINE

## 2021-11-16 PROCEDURE — 94760 N-INVAS EAR/PLS OXIMETRY 1: CPT

## 2021-11-16 PROCEDURE — P9016 RBC LEUKOCYTES REDUCED: HCPCS

## 2021-11-16 PROCEDURE — 85025 COMPLETE CBC W/AUTO DIFF WBC: CPT

## 2021-11-16 PROCEDURE — 74011000258 HC RX REV CODE- 258: Performed by: FAMILY MEDICINE

## 2021-11-16 PROCEDURE — 94640 AIRWAY INHALATION TREATMENT: CPT

## 2021-11-16 PROCEDURE — 86901 BLOOD TYPING SEROLOGIC RH(D): CPT

## 2021-11-16 PROCEDURE — 80053 COMPREHEN METABOLIC PANEL: CPT

## 2021-11-16 RX ORDER — BUDESONIDE AND FORMOTEROL FUMARATE DIHYDRATE 160; 4.5 UG/1; UG/1
2 AEROSOL RESPIRATORY (INHALATION)
Status: DISCONTINUED | OUTPATIENT
Start: 2021-11-16 | End: 2021-11-19 | Stop reason: HOSPADM

## 2021-11-16 RX ORDER — TRAMADOL HYDROCHLORIDE 50 MG/1
50 TABLET ORAL
Status: DISCONTINUED | OUTPATIENT
Start: 2021-11-16 | End: 2021-11-16

## 2021-11-16 RX ORDER — PANTOPRAZOLE SODIUM 40 MG/1
40 TABLET, DELAYED RELEASE ORAL DAILY
Status: DISCONTINUED | OUTPATIENT
Start: 2021-11-16 | End: 2021-11-19 | Stop reason: HOSPADM

## 2021-11-16 RX ORDER — FAMOTIDINE 20 MG/1
20 TABLET, FILM COATED ORAL 2 TIMES DAILY
Status: DISCONTINUED | OUTPATIENT
Start: 2021-11-16 | End: 2021-11-19 | Stop reason: HOSPADM

## 2021-11-16 RX ORDER — SODIUM CHLORIDE 9 MG/ML
250 INJECTION, SOLUTION INTRAVENOUS AS NEEDED
Status: DISCONTINUED | OUTPATIENT
Start: 2021-11-16 | End: 2021-11-19 | Stop reason: HOSPADM

## 2021-11-16 RX ORDER — TRAMADOL HYDROCHLORIDE 50 MG/1
50 TABLET ORAL
Status: DISCONTINUED | OUTPATIENT
Start: 2021-11-16 | End: 2021-11-19 | Stop reason: HOSPADM

## 2021-11-16 RX ADMIN — TRAZODONE HYDROCHLORIDE 25 MG: 50 TABLET ORAL at 22:14

## 2021-11-16 RX ADMIN — FUROSEMIDE 40 MG: 10 INJECTION, SOLUTION INTRAVENOUS at 08:11

## 2021-11-16 RX ADMIN — FAMOTIDINE 20 MG: 20 TABLET, FILM COATED ORAL at 22:17

## 2021-11-16 RX ADMIN — PIPERACILLIN SODIUM AND TAZOBACTAM SODIUM 3.38 G: 3; .375 INJECTION, POWDER, LYOPHILIZED, FOR SOLUTION INTRAVENOUS at 20:29

## 2021-11-16 RX ADMIN — FAMOTIDINE 20 MG: 20 TABLET, FILM COATED ORAL at 13:02

## 2021-11-16 RX ADMIN — PANTOPRAZOLE SODIUM 40 MG: 40 TABLET, DELAYED RELEASE ORAL at 13:00

## 2021-11-16 RX ADMIN — METOPROLOL SUCCINATE 25 MG: 25 TABLET, EXTENDED RELEASE ORAL at 08:11

## 2021-11-16 RX ADMIN — PIPERACILLIN SODIUM AND TAZOBACTAM SODIUM 3.38 G: 3; .375 INJECTION, POWDER, LYOPHILIZED, FOR SOLUTION INTRAVENOUS at 05:49

## 2021-11-16 RX ADMIN — METHYLPREDNISOLONE SODIUM SUCCINATE 40 MG: 40 INJECTION, POWDER, FOR SOLUTION INTRAMUSCULAR; INTRAVENOUS at 05:49

## 2021-11-16 RX ADMIN — PIPERACILLIN SODIUM AND TAZOBACTAM SODIUM 3.38 G: 3; .375 INJECTION, POWDER, LYOPHILIZED, FOR SOLUTION INTRAVENOUS at 13:01

## 2021-11-16 RX ADMIN — TRAMADOL HYDROCHLORIDE 50 MG: 50 TABLET, COATED ORAL at 22:14

## 2021-11-16 RX ADMIN — METHYLPREDNISOLONE SODIUM SUCCINATE 40 MG: 40 INJECTION, POWDER, FOR SOLUTION INTRAMUSCULAR; INTRAVENOUS at 00:00

## 2021-11-16 RX ADMIN — BUDESONIDE AND FORMOTEROL FUMARATE DIHYDRATE 2 PUFF: 160; 4.5 AEROSOL RESPIRATORY (INHALATION) at 07:41

## 2021-11-16 RX ADMIN — TRAMADOL HYDROCHLORIDE 50 MG: 50 TABLET, COATED ORAL at 14:15

## 2021-11-16 RX ADMIN — METHYLPREDNISOLONE SODIUM SUCCINATE 40 MG: 40 INJECTION, POWDER, FOR SOLUTION INTRAMUSCULAR; INTRAVENOUS at 13:00

## 2021-11-16 RX ADMIN — ASPIRIN 81 MG CHEWABLE TABLET 81 MG: 81 TABLET CHEWABLE at 08:10

## 2021-11-16 RX ADMIN — BUDESONIDE AND FORMOTEROL FUMARATE DIHYDRATE 2 PUFF: 160; 4.5 AEROSOL RESPIRATORY (INHALATION) at 20:03

## 2021-11-16 RX ADMIN — METHYLPREDNISOLONE SODIUM SUCCINATE 40 MG: 40 INJECTION, POWDER, FOR SOLUTION INTRAMUSCULAR; INTRAVENOUS at 22:14

## 2021-11-16 RX ADMIN — FUROSEMIDE 40 MG: 10 INJECTION, SOLUTION INTRAVENOUS at 22:18

## 2021-11-16 NOTE — PROGRESS NOTES
Progress Note    Patient: Wei Nickerson MRN: [id number]  SSN: [id number]    YOB: 1940  Age: [de-identified] y.o. Sex: male      Admit Date: 11/15/2021    LOS: 1 day     Subjective:     No acute events overnight    Objective:     Vitals:    11/16/21 1200 11/16/21 1400 11/16/21 1428 11/16/21 1522   BP:  118/60 138/67 136/68   Pulse: 76 80 76 78   Resp:  21     Temp:  98.2 °F (36.8 °C) 98.4 °F (36.9 °C) 98.6 °F (37 °C)   SpO2:  96% 93% 94%   Weight:       Height:            Intake and Output:  Current Shift: 11/16 0701 - 11/16 1900  In: 500   Out: -   Last three shifts: No intake/output data recorded. Physical Exam:   General:  Alert, cooperative, no distress, appears stated age. Eyes:  Conjunctivae/corneas clear. PERRL, EOMs intact. Fundi benign   Ears:  Normal TMs and external ear canals both ears. Nose: Nares normal. Septum midline. Mucosa normal. No drainage or sinus tenderness. Mouth/Throat: Lips, mucosa, and tongue normal. Teeth and gums normal.   Neck: Supple, symmetrical, trachea midline, no adenopathy, thyroid: no enlargment/tenderness/nodules, no carotid bruit and no JVD. Back:   Symmetric, no curvature. ROM normal. No CVA tenderness. Lungs:   Clear to auscultation bilaterally. Heart:  Regular rate and rhythm, S1, S2 normal, no murmur, click, rub or gallop. Abdomen:   Soft, non-tender. Bowel sounds normal. No masses,  No organomegaly. Extremities: Extremities normal, atraumatic, no cyanosis or edema. Pulses: 2+ and symmetric all extremities. Skin: Skin color, texture, turgor normal. No rashes or lesions   Lymph nodes: Cervical, supraclavicular, and axillary nodes normal.   Neurologic: CNII-XII intact. Normal strength, sensation and reflexes throughout. Lab/Data Review: All lab results for the last 24 hours reviewed.          Assessment:     Active Problems:    CHF (congestive heart failure) ([address] (11/15/2021)      PNA (pneumonia) (11/15/2021)       Patient is an 16-year-old white male with:  1. Atrial fibrillation with rapid ventricular rate  2. Alcohol abuse  3. Nicotine dependence  4. Coronary artery status post PCI  5. Hyperlipidemia  6. Frequent falls  7. Heart failure with reduced ejection fraction  8. Anemia    Plan:        There is no accurate ins and outs. Continue IV diuresis.   Check an echocardiogram  Signed By: Gail Dennison MD     November 16, 2021

## 2021-11-16 NOTE — PROGRESS NOTES
CM telephoned [facility], [facility] [phone] with AZUL 11/17/2021. Ms. Renetta Poole will bring patient clothing and provide transportation for discharge.      CM will continue to follow patient and recs of medical team.     Current Dispo: Discharge to [facility]

## 2021-11-16 NOTE — PROGRESS NOTES
PULMONARY NOTE  [facility]    Name: Harvinder Smith MRN: [id number]   : 1940 Hospital: [address] [place] [address]   Date: 2021  Admission date: 11/15/2021 Hospital Day: 2       HPI:     Hospital Problems  Never Reviewed          Codes Class Noted POA    CHF (congestive heart failure) ([facility] ICD-10-CM: I50.9  ICD-9-CM: 428.0  11/15/2021 Unknown        PNA (pneumonia) ICD-10-CM: J18.9  ICD-9-CM: 955  11/15/2021 Unknown                   [x] High complexity decision making was performed  [x] See my orders for details      Subjective/Initial History:     I was asked by Lonny Ryder MD to see Harvinder Smith  a [de-identified] y.o.    male in consultation     Excerpts from admission 11/15/2021 or consult notes as follows:   44-year-old male came in because of shortness of breath and dyspnea was found to be in atrial fibrillation, he smokes 2 packs/day has been smoking since the age of 7 denies any history of chest pain but complaining about shortness of breath and dyspnea for the past 1 to 2 weeks x-ray was done which shows bilateral pleural effusion and some infiltrate and history of multiple right-sided rib fracture probably old so admitted and pulmonary consult was called for further evaluation      No Known Allergies     MAR reviewed and pertinent medications noted or modified as needed     Current Facility-Administered Medications   Medication    budesonide-formoteroL (SYMBICORT) 160-4.5 mcg/actuation HFA inhaler 2 Puff    acetaminophen (TYLENOL) tablet 650 mg    Or    acetaminophen (TYLENOL) suppository 650 mg    polyethylene glycol (MIRALAX) packet 17 g    ondansetron (ZOFRAN ODT) tablet 4 mg    Or    ondansetron (ZOFRAN) injection 4 mg    methylPREDNISolone (PF) (SOLU-MEDROL) injection 40 mg    piperacillin-tazobactam (ZOSYN) 3.375 g in 0.9% sodium chloride (MBP/ADV) 100 mL MBP    furosemide (LASIX) injection 40 mg    aspirin chewable tablet 81 mg    metoprolol succinate (TOPROL-XL) XL tablet 25 mg    albuterol (PROVENTIL HFA, VENTOLIN HFA, PROAIR HFA) inhaler 2 Puff    influenza vaccine  (6 mos+)(PF) (FLUARIX/FLULAVAL/FLUZONE QUAD) injection 0.5 mL    traZODone (DESYREL) tablet 25 mg      Patient PCP: Kristina Gustafson MD  PMH:  has a past medical history of GERD (gastroesophageal reflux disease) and Hypertension. PSH:   has no past surgical history on file. FHX: family history is not on file. SHX:  reports that he has been smoking. He has been smoking about 2.00 packs per day. He has never used smokeless tobacco.     ROS:    Review of Systems   Constitutional: Positive for malaise/fatigue. HENT: Negative. Eyes: Negative. Respiratory: Positive for cough, sputum production and wheezing. Cardiovascular: Negative. Gastrointestinal: Negative. Genitourinary: Negative. Musculoskeletal: Negative. Skin: Negative. Neurological: Negative. Psychiatric/Behavioral: Negative. Objective:     Vital Signs: Telemetry:    AFIB Intake/Output:   Visit Vitals  BP (!) 150/77 (BP 1 Location: Left upper arm, BP Patient Position: At rest;Semi fowlers)   Pulse 68   Temp 98 °F (36.7 °C)   Resp 20   Ht 5' 11.5\" (1.816 m)   Wt 74.8 kg (165 lb)   SpO2 92%   BMI 22.69 kg/m²       Temp (24hrs), Av.7 °F (36.5 °C), Min:97.2 °F (36.2 °C), Max:98 °F (36.7 °C)        O2 Device: None (Room air) O2 Flow Rate (L/min): 2 l/min       Wt Readings from Last 4 Encounters:   11/15/21 74.8 kg (165 lb)        No intake or output data in the 24 hours ending 21 0932    Last shift:      No intake/output data recorded. Last 3 shifts: No intake/output data recorded. Physical Exam:     Physical Exam  Constitutional:       Appearance: Normal appearance. HENT:      Head: Normocephalic and atraumatic. Nose: Nose normal.      Mouth/Throat:      Mouth: Mucous membranes are moist.   Eyes:      Pupils: Pupils are equal, round, and reactive to light. Cardiovascular:      Rate and Rhythm: Normal rate. Rhythm irregular. Pulses: Normal pulses. Heart sounds: Normal heart sounds. Pulmonary:      Effort: Pulmonary effort is normal.      Breath sounds: Wheezing present. Abdominal:      General: Abdomen is flat. Bowel sounds are normal.   Musculoskeletal:         General: Normal range of motion. Cervical back: Normal range of motion and neck supple. Skin:     General: Skin is warm. Neurological:      Mental Status: He is alert. Labs:    Recent Labs     11/16/21  0423 11/15/21  0935   WBC 11.4* 13.6*   HGB 6.8* 7.1*    405*     Recent Labs     11/16/21  0423 11/15/21  2147 11/15/21  1200 11/15/21  0935    139  --  144   K 4.1 3.5  --  4.2    105  --  113*   CO2 28 28  --  25   * 202*  --  128*   BUN 19 13  --  15   CREA 1.19 1.17  --  1.05   CA 8.6 8.6  --  8.7   LAC  --   --  1.4  --    ALB 3.2*  --   --  3.4*   ALT 14  --   --  14     No results for input(s): PH, PCO2, PO2, HCO3, FIO2 in the last 72 hours. No results for input(s): CPK, CKNDX, TROIQ in the last 72 hours. No lab exists for component: CPKMB  No results found for: BNPP, BNP   Lab Results   Component Value Date/Time    Culture result: No growth after 20 hours 11/15/2021 12:00 PM   No results found for: TSH, TSHEXT, TSHEXT    Imaging:    CXR Results  (Last 48 hours)               11/15/21 1006  XR CHEST SNGL V Final result    Impression:  1. Bilateral pleural effusions with bilateral airspace disease or edema. 2. Multiple right-sided rib fractures, probably old, recommend follow-up as   warranted. Narrative:  Portable upright radiograph chest 10:02 a.m.. No prior study. INDICATION: Shortness of breath. Heart size is at the upper limits of normal.. There are bilateral pleural   effusions with bilateral diffuse airspace disease or edema. Multiple right rib   fractures. No pneumothorax.  Degenerative changes of the shoulders and spine.               Results from Hospital Encounter encounter on 11/15/21    XR CHEST SNGL V    Narrative  Portable upright radiograph chest 10:02 a.m.. No prior study. INDICATION: Shortness of breath. Heart size is at the upper limits of normal.. There are bilateral pleural  effusions with bilateral diffuse airspace disease or edema. Multiple right rib  fractures. No pneumothorax. Degenerative changes of the shoulders and spine. Impression  1. Bilateral pleural effusions with bilateral airspace disease or edema. 2. Multiple right-sided rib fractures, probably old, recommend follow-up as  warranted. No results found for this or any previous visit. IMPRESSION:   1. Acute hypoxic respiratory failure  2. Chronic Obstructive Pulmonary Disease with Severe Acute Exacerbation requiring inpatient hospitalization and management; has very poor airway clearance. Increased work of breathing  3. Atrial fibrillation with RVR  4. Congestive heart failure rule out cor pulmonale  5. Tobacco abuse  6. Anemia      RECOMMENDATIONS/PLAN:     1. Will start patient on oxygen via nasal cannula and patient is declining to use oxygen at home started him on inhalers but he does not want to use inhalers when discharged from here  2. Now is in A. fib with RVR cardiology on case  3. Chest x-ray shows most likely congestive heart failure  4. Anemia hemoglobin 6.8 transfuse packed RBC and will get GI evaluation  5. Supplemental O2 to keep sats > 93%  6. Aspiration precautions  7. Labs to follow electrolytes, renal function and and blood counts  8. Glucose monitoring and SSI  9. Bronchial hygiene with respiratory therapy techniques, bronchodilators  10. DVT, SUP prophylaxis  11. Smoking cessation counseling done offered nicotine patch but patient declined  12. Pt needs IV fluids with additives and Drug therapy requiring intensive monitoring for toxicity  13.  Prescription drug management with home med reconciliation reviewed ·           Ayala Pena MD

## 2021-11-16 NOTE — PROGRESS NOTES
*ATTENTION:  This note has been created by a medical student for educational purposes only. Please do not refer to the content of this note for clinical decision-making, billing, or other purposes. Please see attending physicians note to obtain clinical information on this patient. *       PROGRESS NOTE    Subjective:     Shira Pollock, [de-identified] y.o. male with a past medical history significant for coronary disease s/p PCI, dilated cardiomyopathy, afib, alcohol abuse, current smoker 2ppd presents to the ED with chief complaint of Shortness of Breath and sharp non-exertional non-radiating chest pain. 81-year-old male has a known \"weak heart\". Does not follow with cardiology. Says he has been having worsening shortness of breath has been progressive over the last 1 to 2 weeks. Has chronic intermittent cough. Unsure about any fevers. Is feeling weaker. No new swelling. No nausea vomiting diarrhea. Some breathing that is been bothering him. Not related to position or exertion. Upon work-up was found to be in afib with elevated troponin and elevated BNP. Concern for NSTEMI.    11/16/21:  Today patient is feeling very well. No complaints except for intermittent cough. Denies chest pain, SOB, fevers/chills. Hgb dropped lower to 6.8, requires transfusion. WBC's remain elevated but trended downward, cannot rule out dilution as a factor. Social History     Tobacco Use    Smoking status: 2packs per day since he was 7     Smokeless tobacco: Not on file   Substance Use Topics    Alcohol use: 40oz beer daily, sometimes liquor too        Prior to Admission medications    Medication Sig Start Date End Date Taking? Authorizing Provider   amLODIPine (NORVASC) 10 mg tablet Take 10 mg by mouth daily. 9/3/21  Yes Provider, Historical   clopidogreL (PLAVIX) 75 mg tab Take 75 mg by mouth daily. 9/3/21  Yes Provider, Historical   lisinopriL (PRINIVIL, ZESTRIL) 20 mg tablet Take 20 mg by mouth daily.  9/3/21  Yes Provider, Historical   omeprazole (PRILOSEC) 20 mg capsule Take 20 mg by mouth daily. 9/3/21  Yes Provider, Historical   tamsulosin (FLOMAX) 0.4 mg capsule Take 1 Capsule by mouth daily. 9/3/21  Yes Provider, Historical   traZODone (DESYREL) 50 mg tablet Take 1 Tablet by mouth nightly as needed. 9/3/21  Yes Provider, Historical     No Known Allergies       Objective: Intake and Output:    No intake/output data recorded. No intake/output data recorded. Physical Exam:   Visit Vitals  BP (!) 150/77 (BP 1 Location: Left upper arm, BP Patient Position: At rest;Semi fowlers)   Pulse 68   Temp 98 °F (36.7 °C)   Resp 20   Ht 5' 11.5\" (1.816 m)   Wt 165 lb (74.8 kg)   SpO2 92%   BMI 22.69 kg/m²     General:  Alert, cooperative, slight difficult breathing, appears stated age. Head:  Normocephalic, without obvious abnormality, atraumatic. Eyes:  Conjunctivae/corneas clear. PERRL, EOMs intact. Fundi benign   Ears:  Normal TMs and external ear canals both ears. Nose: Nares normal. Septum midline. Mucosa normal. No drainage or sinus tenderness. Throat: Lips, mucosa, and tongue normal. Teeth and gums normal.   Neck: Supple, symmetrical, trachea midline, no adenopathy, thyroid: no enlargement/tenderness/nodules, no carotid bruit and no JVD. Back:   Symmetric, no curvature. ROM normal. No CVA tenderness. Lungs:   Rales and rhonchi, coughing on exam    Chest wall:  No tenderness or deformity. Heart:  Tachycardic and normal rhythm, S1, S2 normal, no murmur, click, rub or gallop. Abdomen:   Soft, non-tender. Bowel sounds normal. No masses,  No organomegaly. Genitalia:  Normal male without lesion, discharge or tenderness. Rectal:  Normal tone, normal prostate, no masses or tenderness  Guaiac negative stool. Extremities: Extremities normal, atraumatic, no cyanosis or edema. Pulses: 2+ and symmetric all extremities.    Skin: Skin color, texture, turgor normal. No rashes or lesions   Lymph nodes: Cervical, supraclavicular, and axillary nodes normal.   Neurologic: CNII-XII intact. Normal strength, sensation and reflexes throughout. ECG:  Atrial fibrillation. Nonspecific T wave abnormality      Data Review:   Recent Results (from the past 24 hour(s))   EKG, 12 LEAD, INITIAL    Collection Time: 11/15/21  9:33 AM   Result Value Ref Range    Ventricular Rate 99 BPM    Atrial Rate 108 BPM    QRS Duration 90 ms    Q-T Interval 308 ms    QTC Calculation (Bezet) 395 ms    Calculated R Axis 67 degrees    Calculated T Axis 54 degrees    Diagnosis       Atrial fibrillation  Nonspecific T wave abnormality  Abnormal ECG  No previous ECGs available  Confirmed by Alden RUIZ MD ([id number]) on 11/15/2021 11:18:07 AM     CBC WITH AUTOMATED DIFF    Collection Time: 11/15/21  9:35 AM   Result Value Ref Range    WBC 13.6 (H) 4.1 - 11.1 K/uL    RBC 3.65 (L) 4.10 - 5.70 M/uL    HGB 7.1 (L) 12.1 - 17.0 g/dL    HCT 26.0 (L) 36.6 - 50.3 %    MCV 71.2 (L) 80.0 - 99.0 FL    MCH 19.5 (L) 26.0 - 34.0 PG    MCHC 27.3 (L) 30.0 - 36.5 g/dL    RDW 19.8 (H) 11.5 - 14.5 %    PLATELET 426 (H) 870 - 400 K/uL    MPV 11.6 8.9 - 12.9 FL    NRBC 0.0 0.0  WBC    ABSOLUTE NRBC 0.00 0.00 - 0.01 K/uL    NEUTROPHILS 88 (H) 32 - 75 %    LYMPHOCYTES 4 (L) 12 - 49 %    MONOCYTES 7 5 - 13 %    EOSINOPHILS 0 0 - 7 %    BASOPHILS 0 0 - 1 %    IMMATURE GRANULOCYTES 1 (H) 0 - 0.5 %    ABS. NEUTROPHILS 12.0 (H) 1.8 - 8.0 K/UL    ABS. LYMPHOCYTES 0.5 (L) 0.8 - 3.5 K/UL    ABS. MONOCYTES 1.0 0.0 - 1.0 K/UL    ABS. EOSINOPHILS 0.0 0.0 - 0.4 K/UL    ABS. BASOPHILS 0.0 0.0 - 0.1 K/UL    ABS. IMM.  GRANS. 0.1 (H) 0.00 - 0.04 K/UL    DF AUTOMATED     METABOLIC PANEL, COMPREHENSIVE    Collection Time: 11/15/21  9:35 AM   Result Value Ref Range    Sodium 144 136 - 145 mmol/L    Potassium 4.2 3.5 - 5.1 mmol/L    Chloride 113 (H) 97 - 108 mmol/L    CO2 25 21 - 32 mmol/L    Anion gap 6 5 - 15 mmol/L    Glucose 128 (H) 65 - 100 mg/dL    BUN 15 6 - 20 mg/dL    Creatinine 1.05 0.70 - 1.30 mg/dL    BUN/Creatinine ratio 14 12 - 20      GFR est AA >60 >60 ml/min/1.73m2    GFR est non-AA >60 >60 ml/min/1.73m2    Calcium 8.7 8.5 - 10.1 mg/dL    Bilirubin, total 0.5 0.2 - 1.0 mg/dL    AST (SGOT) 11 (L) 15 - 37 U/L    ALT (SGPT) 14 12 - 78 U/L    Alk.  phosphatase 85 45 - 117 U/L    Protein, total 6.8 6.4 - 8.2 g/dL    Albumin 3.4 (L) 3.5 - 5.0 g/dL    Globulin 3.4 2.0 - 4.0 g/dL    A-G Ratio 1.0 (L) 1.1 - 2.2     CK W/ REFLX CKMB    Collection Time: 11/15/21  9:35 AM   Result Value Ref Range    CK 59.0 39 - 308 ng/mL   TROPONIN-HIGH SENSITIVITY    Collection Time: 11/15/21  9:35 AM   Result Value Ref Range    Troponin-High Sensitivity 147 (HH) 0 - 76 ng/L   NT-PRO BNP    Collection Time: 11/15/21  9:35 AM   Result Value Ref Range    NT pro-BNP 3,954 (H) <450 pg/mL   URINALYSIS W/ REFLEX CULTURE    Collection Time: 11/15/21  9:35 AM    Specimen: Urine   Result Value Ref Range    Color Yellow/Straw      Appearance Clear Clear      Specific gravity 1.016 1.003 - 1.030      pH (UA) 5.0 5.0 - 8.0      Protein 30 (A) Negative mg/dL    Glucose Negative Negative mg/dL    Ketone Negative Negative mg/dL    Bilirubin Negative Negative      Blood Negative Negative      Urobilinogen 2.0 (H) 0.1 - 1.0 EU/dL    Nitrites Negative Negative      Leukocyte Esterase Negative Negative      WBC 0-4 0 - 4 /hpf    RBC 0-5 0 - 5 /hpf    Bacteria Negative Negative /hpf    UA:UC IF INDICATED Culture not indicated by UA result Culture not indicated by UA result      Hyaline cast 0-2 0 - 5 /lpf   LACTIC ACID    Collection Time: 11/15/21 12:00 PM   Result Value Ref Range    Lactic acid 1.4 0.4 - 2.0 mmol/L   COVID-19 RAPID TEST    Collection Time: 11/15/21  9:00 PM   Result Value Ref Range    Specimen source Please find results under separate order      COVID-19 rapid test Not Detected Not Detected     METABOLIC PANEL, BASIC    Collection Time: 11/15/21  9:47 PM   Result Value Ref Range    Sodium 139 136 - 145 mmol/L Potassium 3.5 3.5 - 5.1 mmol/L    Chloride 105 97 - 108 mmol/L    CO2 28 21 - 32 mmol/L    Anion gap 6 5 - 15 mmol/L    Glucose 202 (H) 65 - 100 mg/dL    BUN 13 6 - 20 mg/dL    Creatinine 1.17 0.70 - 1.30 mg/dL    BUN/Creatinine ratio 11 (L) 12 - 20      GFR est AA >60 >60 ml/min/1.73m2    GFR est non-AA 60 (L) >60 ml/min/1.73m2    Calcium 8.6 8.5 - 28.1 mg/dL   METABOLIC PANEL, COMPREHENSIVE    Collection Time: 11/16/21  4:23 AM   Result Value Ref Range    Sodium 138 136 - 145 mmol/L    Potassium 4.1 3.5 - 5.1 mmol/L    Chloride 104 97 - 108 mmol/L    CO2 28 21 - 32 mmol/L    Anion gap 6 5 - 15 mmol/L    Glucose 143 (H) 65 - 100 mg/dL    BUN 19 6 - 20 mg/dL    Creatinine 1.19 0.70 - 1.30 mg/dL    BUN/Creatinine ratio 16 12 - 20      GFR est AA >60 >60 ml/min/1.73m2    GFR est non-AA 59 (L) >60 ml/min/1.73m2    Calcium 8.6 8.5 - 10.1 mg/dL    Bilirubin, total 0.5 0.2 - 1.0 mg/dL    AST (SGOT) 12 (L) 15 - 37 U/L    ALT (SGPT) 14 12 - 78 U/L    Alk. phosphatase 75 45 - 117 U/L    Protein, total 6.3 (L) 6.4 - 8.2 g/dL    Albumin 3.2 (L) 3.5 - 5.0 g/dL    Globulin 3.1 2.0 - 4.0 g/dL    A-G Ratio 1.0 (L) 1.1 - 2.2     CBC WITH AUTOMATED DIFF    Collection Time: 11/16/21  4:23 AM   Result Value Ref Range    WBC 11.4 (H) 4.1 - 11.1 K/uL    RBC 3.50 (L) 4.10 - 5.70 M/uL    HGB 6.8 (L) 12.1 - 17.0 g/dL    HCT 24.0 (L) 36.6 - 50.3 %    MCV 68.6 (L) 80.0 - 99.0 FL    MCH 19.4 (L) 26.0 - 34.0 PG    MCHC 28.3 (L) 30.0 - 36.5 g/dL    RDW 19.4 (H) 11.5 - 14.5 %    PLATELET 491 590 - 755 K/uL    MPV 10.7 8.9 - 12.9 FL    NRBC 0.2 (H) 0.0  WBC    ABSOLUTE NRBC 0.02 (H) 0.00 - 0.01 K/uL    NEUTROPHILS 92 (H) 32 - 75 %    LYMPHOCYTES 5 (L) 12 - 49 %    MONOCYTES 3 (L) 5 - 13 %    EOSINOPHILS 0 0 - 7 %    BASOPHILS 0 0 - 1 %    IMMATURE GRANULOCYTES 0 0 - 0.5 %    ABS. NEUTROPHILS 10.5 (H) 1.8 - 8.0 K/UL    ABS. LYMPHOCYTES 0.5 (L) 0.8 - 3.5 K/UL    ABS. MONOCYTES 0.3 0.0 - 1.0 K/UL    ABS. EOSINOPHILS 0.0 0.0 - 0.4 K/UL    ABS. BASOPHILS 0.0 0.0 - 0.1 K/UL    ABS. IMM. GRANS. 0.1 (H) 0.00 - 0.04 K/UL    DF AUTOMATED       IMAGING  Chest x-ray 1. Bilateral pleural effusions with bilateral airspace disease or edema. 2. Multiple right-sided rib fractures, probably old, recommend follow-up as  Warranted. CONSULTS   Cardiology: Dx HFrEF, Afib with RVR. Continue IV Lasix BID. Strict I/O monitoring. Start metoprolol for rate control and aspirin 81mg daily. Order ECHO. Pulmonology: Dx resp fail, COPD. O2NC >93%, intubate and place on vent if NIV fails. Empiriv IV antibiotics pending culture results. Bronchodilators.     MEDICATIONS    Current Facility-Administered Medications:     budesonide-formoteroL (SYMBICORT) 160-4.5 mcg/actuation HFA inhaler 2 Puff, 2 Puff, Inhalation, BID RT, Wendy Apodaca MD, 2 Puff at 11/16/21 0741    acetaminophen (TYLENOL) tablet 650 mg, 650 mg, Oral, Q6H PRN **OR** acetaminophen (TYLENOL) suppository 650 mg, 650 mg, Rectal, Q6H PRN, Elaine Apodaca MD    polyethylene glycol (MIRALAX) packet 17 g, 17 g, Oral, DAILY PRN, Elaine Apodaca MD    ondansetron (ZOFRAN ODT) tablet 4 mg, 4 mg, Oral, Q8H PRN **OR** ondansetron (ZOFRAN) injection 4 mg, 4 mg, IntraVENous, Q6H PRN, Wendy Apodaca MD    methylPREDNISolone (PF) (SOLU-MEDROL) injection 40 mg, 40 mg, IntraVENous, Q6H, Wendy Apodaca MD, 40 mg at 11/16/21 0549    piperacillin-tazobactam (ZOSYN) 3.375 g in 0.9% sodium chloride (MBP/ADV) 100 mL MBP, 3.375 g, IntraVENous, Q8H, Wendy Apodaca MD, Last Rate: 200 mL/hr at 11/16/21 0549, 3.375 g at 11/16/21 0549    furosemide (LASIX) injection 40 mg, 40 mg, IntraVENous, BID, Wendy Apodaca MD, 40 mg at 11/16/21 6140    aspirin chewable tablet 81 mg, 81 mg, Oral, DAILY, Saniya Tamayo MD, 81 mg at 11/16/21 0810    metoprolol succinate (TOPROL-XL) XL tablet 25 mg, 25 mg, Oral, DAILY, Saniya Tamayo MD, 25 mg at 11/16/21 0811    albuterol (PROVENTIL HFA, VENTOLIN HFA, PROAIR HFA) inhaler 2 Puff, 2 Puff, Inhalation, Q4H PRN, Jose Santana MD    influenza vaccine 2021-22 (6 mos+)(PF) (FLUARIX/FLULAVAL/FLUZONE QUAD) injection 0.5 mL, 1 Each, IntraMUSCular, PRIOR TO DISCHARGE, Wendy Apodaca MD    traZODone (DESYREL) tablet 25 mg, 25 mg, Oral, QHS, Saniya Tamayo MD, 25 mg at 11/15/21 4242    Assessment/plan:   Congestive heart failure with reduced EF  - CXR:  Bilateral pleural effusions with bilateral airspace disease or edema. Multiple right-sided rib fractures, probably old, recommend follow-up as warranted.    -Continue IV Lasix BID. Strict I/O monitoring. Order ECHO. Atrial Fibrillation with RVR  Start metoprolol for rate control and aspirin 81mg daily. PNA due to infectious organism  - elevated WBC  - start on abx     hgb at 7.1   - transfuse if goes below 7    Acute hypoxic respiratory failure  COPD with severe acute exacerbation, poor airway clearance  -O2NC >93%, intubate and place on vent if NIV fails.   -Empiric IV antibiotics pending culture results. -Bronchodilators.  -Order ABG    HTN  -Continue home BP medications    Hyperlipidemia    Alcohol Abuse  -CIWA protocol  -Seizure precautions  -Ativan on stand-by    Nicotine dependence  -Nicotine patch prn    Frequent falls  -Fall precautions    At time of admission was unable to start any anticoagulation due to anemia hemoglobin 7.1.  Was started on IV Lasix 40 twice daily IV Solu-Medrol nebulizer treatment IV Zosyn cardiology pulmonary consult    Need home medication not available at this time

## 2021-11-16 NOTE — CONSULTS
Gastroenterology Consult     Referring Physician: Alvin Adams MD     Consult Date: 11/16/2021     Subjective:     Chief Complaint: Shortness of Breath. History of Present Illness: Jacobo Jett is a [de-identified] y.o. male who is seen in consultation for GI Bleed. The patient has a past medical history significant for CAD s/p PCI, dilated cardiomyopathy, CHFrEF, atrial fibrillation,. Admits to smoking 2 PPD of cigarettes since the age of 7, and drinks  A 40 oz beer daily. His last drink was about a week ago. Mr. Rich Soto presented to the ED with complaints of shortness of breath which has been progressively worsening over the past 2 weeks and increased weakness. He denies nausea and vomiting. He does report his appetite has been poor. His last colonoscopy was 2 years ago and he reports they removed 4 polyps. His stool for occult blood is pending. He reports his bowel movements are normal but they have have been dark in color. On admission his hgB noted to at 6.8. He does have one unit infusing at time of exam. His EKG on admission shows Afib. His heart rate is controlled at this time. proBNP elevated at 7.967. Normal LFT's. Cardiology was consulted for further evaluation. He reports he fell about a month ago but none recently. Plan for EGD in the am.    Chest x-ray 11/15/2021: INDICATION: Shortness of breath. Heart size is at the upper limits of normal.. There are bilateral pleural  effusions with bilateral diffuse airspace disease or edema. Multiple right rib  fractures. No pneumothorax. Degenerative changes of the shoulders and spine  IMPRESSION1. Bilateral pleural effusions with bilateral airspace disease or edema. 2. Multiple right-sided rib fractures, probably old, recommend follow-up as warranted. Past Medical History:   Diagnosis Date    GERD (gastroesophageal reflux disease)     Hypertension      History reviewed. No pertinent surgical history. History reviewed.  No pertinent family history. Social History     Tobacco Use    Smoking status: Current Every Day Smoker     Packs/day: 2.00    Smokeless tobacco: Never Used   Substance Use Topics    Alcohol use: Not on file      No Known Allergies  Current Facility-Administered Medications   Medication Dose Route Frequency    budesonide-formoteroL (SYMBICORT) 160-4.5 mcg/actuation HFA inhaler 2 Puff  2 Puff Inhalation BID RT    0.9% sodium chloride infusion 250 mL  250 mL IntraVENous PRN    methylPREDNISolone (PF) (SOLU-MEDROL) injection 40 mg  40 mg IntraVENous Q8H    pantoprazole (PROTONIX) tablet 40 mg  40 mg Oral DAILY    0.9% sodium chloride infusion 250 mL  250 mL IntraVENous PRN    famotidine (PEPCID) tablet 20 mg  20 mg Oral BID    traMADoL (ULTRAM) tablet 50 mg  50 mg Oral Q6H PRN    acetaminophen (TYLENOL) tablet 650 mg  650 mg Oral Q6H PRN    Or    acetaminophen (TYLENOL) suppository 650 mg  650 mg Rectal Q6H PRN    polyethylene glycol (MIRALAX) packet 17 g  17 g Oral DAILY PRN    ondansetron (ZOFRAN ODT) tablet 4 mg  4 mg Oral Q8H PRN    Or    ondansetron (ZOFRAN) injection 4 mg  4 mg IntraVENous Q6H PRN    piperacillin-tazobactam (ZOSYN) 3.375 g in 0.9% sodium chloride (MBP/ADV) 100 mL MBP  3.375 g IntraVENous Q8H    furosemide (LASIX) injection 40 mg  40 mg IntraVENous BID    aspirin chewable tablet 81 mg  81 mg Oral DAILY    metoprolol succinate (TOPROL-XL) XL tablet 25 mg  25 mg Oral DAILY    albuterol (PROVENTIL HFA, VENTOLIN HFA, PROAIR HFA) inhaler 2 Puff  2 Puff Inhalation Q4H PRN    influenza vaccine 2021-22 (6 mos+)(PF) (FLUARIX/FLULAVAL/FLUZONE QUAD) injection 0.5 mL  1 Each IntraMUSCular PRIOR TO DISCHARGE    traZODone (DESYREL) tablet 25 mg  25 mg Oral QHS        Review of Systems:  A detailed 10 organ review of systems is obtained with pertinent positives as listed in the History of Present Illness and Past Medical History. All others are negative.     Objective:     Physical Exam:  Visit Vitals  BP (!) 143/66 (BP 1 Location: Left upper arm, BP Patient Position: At rest;Lying;Supine)   Pulse 60   Temp 97.8 °F (36.6 °C)   Resp 21   Ht 5' 11.5\" (1.816 m)   Wt 74.8 kg (165 lb)   SpO2 94%   BMI 22.69 kg/m²        Skin:  Extremities and face reveal no rashes. No gabriel erythema. No telangiectasias on the chest wall. HEENT: Sclerae anicteric. Extra-occular muscles are intact. No oral ulcers. No abnormal pigmentation of the lips. The neck is supple. Cardiovascular: Regular rate and rhythm. Respiratory:  Comfortable breathing with no accessory muscle use. GI:  Abdomen nondistended, soft, and nontender. Normal active bowel sounds. No enlargement of the liver or spleen. No masses palpable. Rectal:  Deferred  Musculoskeletal: Extremities have good range of motion. Neurological:  Gross memory appears intact. Patient is alert and oriented. Psychiatric:  Mood appears appropriate with judgement intact. Lymphatic:  No cervical or supraclavicular adenopathy. Lab/Data Review:  CMP:   Lab Results   Component Value Date/Time     11/16/2021 04:23 AM    K 4.1 11/16/2021 04:23 AM     11/16/2021 04:23 AM    CO2 28 11/16/2021 04:23 AM    AGAP 6 11/16/2021 04:23 AM     (H) 11/16/2021 04:23 AM    BUN 19 11/16/2021 04:23 AM    CREA 1.19 11/16/2021 04:23 AM    GFRAA >60 11/16/2021 04:23 AM    GFRNA 59 (L) 11/16/2021 04:23 AM    CA 8.6 11/16/2021 04:23 AM    ALB 3.2 (L) 11/16/2021 04:23 AM    TP 6.3 (L) 11/16/2021 04:23 AM    GLOB 3.1 11/16/2021 04:23 AM    AGRAT 1.0 (L) 11/16/2021 04:23 AM    ALT 14 11/16/2021 04:23 AM     CBC:   Lab Results   Component Value Date/Time    WBC 11.4 (H) 11/16/2021 04:23 AM    HGB 6.8 (L) 11/16/2021 04:23 AM    HCT 24.0 (L) 11/16/2021 04:23 AM     11/16/2021 04:23 AM         Assessment/Plan:     1. GI Bleed     1 Unit PRBC's infusing      CBC in the am      Transfuse as needed      EGD in the am      NPO after midnight  2.  CHFrEF     Elevated pro BNP  7,967      Lasix 40 mg BID      Continue beta blocker       Cardiology input appreciated  3. COPD with Exacerbation      Acute Hypoxic Respiratory Failure      Currently on 2 liters of oxygen       Pulmonologist input appreciated      Continue solumedrol   4. GERD      Continue Protonix 40 mg daily    Thank you for allowing me to participate in this patients care. Plan discussed with Dr. Elijah Khan and he approves.

## 2021-11-16 NOTE — PROGRESS NOTES
Tele called reporting patient has dropped down to 48 a few times today and comes right back up to baseline rate. Patient asymptomatic. Resting in bed, no complaints. Current HR 70. Called and notified Dr. Cally Wilson, states ot notify cardio as well. No answer from Dr. Yanci Pearson. Called Dr. Victorino Ray to notify of occasional drops in HR. Last night range was 56-89. Notified Dr. Victorino Ray patient had his first dose of Metoprolol 25mg XL this am as well. N.O obtained from Dr. Victorino Ray for Glucagon 1mg IVP for HR less than 40.

## 2021-11-16 NOTE — PROGRESS NOTES
Nutrition Education    · Verbally reviewed information with Patient  · Educated on CHF MNT  · Written educational materials provided- Heart failure nutrition therapy, My[employer] handout  · Contact name and number provided. · Refer to Patient Education activity for more details. RD spoke to pt bedside. Pt denies following heart health diet PTA. Per diet recall B- cereal, banana, pancakes, rye bread L- sandwich with deli meat on rye D- pork chop, meatloaf, squash with butter. Bevs- 3-4 cups/d coffee, 30 oz/d water 8 oz/d juice. Denies SSB intake. Reports he lives in assisted living facility but unable to identify where. States he does not have a toaster of microwave at facility. Alls meals are provided at facility per pt. Reports limiting salt and fried foods. RD reviewed heart healthy nutrition therapy. Discussed importance of sodium restriction and food sources. Reviewed importance of weighing self daily to assess weight changes and be aware of possible fluid retention. If edema present once d/c encouraged to contact PCP. Reviewed heart healthy foods to increase including: whole grains, low fat dairy, lean proteins, fruits/veggies, healthy fats (olive oil, nuts/seeds, fish, avocados) etc. Discussed fiber and heart health impacts/benefits as well as decreased constipation incidence. Reviewed foods to avoid for heart health including: fried foods, whole fat dairy, cured/deli meats, trans and sat fats, ultra-processed goods, refined grains and sugar etc. Discussed health impacts of daily activity for heart. Encouraged pt to drink 64 oz/d water and limit juice/soda to 8 oz/d. RD gave pt contact info and encouraged to reach out with further questions or concerns.     Electronically signed by Nathaniel Kennedy RD on 11/16/2021 at 1:51 PM    Contact Number: Ext [phone]

## 2021-11-17 ENCOUNTER — ANESTHESIA EVENT (OUTPATIENT)
Dept: ANESTHESIOLOGY | Age: 81
DRG: 280 | End: 2021-11-17
Payer: MEDICARE

## 2021-11-17 ENCOUNTER — ANESTHESIA EVENT (OUTPATIENT)
Dept: ENDOSCOPY | Age: 81
DRG: 280 | End: 2021-11-17
Payer: MEDICARE

## 2021-11-17 ENCOUNTER — APPOINTMENT (OUTPATIENT)
Dept: ENDOSCOPY | Age: 81
DRG: 280 | End: 2021-11-17
Attending: INTERNAL MEDICINE
Payer: MEDICARE

## 2021-11-17 ENCOUNTER — ANESTHESIA (OUTPATIENT)
Dept: ENDOSCOPY | Age: 81
DRG: 280 | End: 2021-11-17
Payer: MEDICARE

## 2021-11-17 LAB
ABO + RH BLD: NORMAL
ALBUMIN SERPL-MCNC: 2.8 G/DL (ref 3.5–5)
ALBUMIN/GLOB SERPL: 0.8 {RATIO} (ref 1.1–2.2)
ALP SERPL-CCNC: 68 U/L (ref 45–117)
ALT SERPL-CCNC: 14 U/L (ref 12–78)
ANION GAP SERPL CALC-SCNC: 7 MMOL/L (ref 5–15)
AST SERPL W P-5'-P-CCNC: 17 U/L (ref 15–37)
BASOPHILS # BLD: 0 K/UL (ref 0–0.1)
BASOPHILS NFR BLD: 0 % (ref 0–1)
BILIRUB SERPL-MCNC: 0.5 MG/DL (ref 0.2–1)
BLD PROD TYP BPU: NORMAL
BLOOD GROUP ANTIBODIES SERPL: NEGATIVE
BPU ID: NORMAL
BUN SERPL-MCNC: 30 MG/DL (ref 6–20)
BUN/CREAT SERPL: 22 (ref 12–20)
CA-I BLD-MCNC: 8.8 MG/DL (ref 8.5–10.1)
CHLORIDE SERPL-SCNC: 104 MMOL/L (ref 97–108)
CO2 SERPL-SCNC: 25 MMOL/L (ref 21–32)
CREAT SERPL-MCNC: 1.38 MG/DL (ref 0.7–1.3)
CROSSMATCH RESULT,%XM: NORMAL
DIFFERENTIAL METHOD BLD: ABNORMAL
EOSINOPHIL # BLD: 0 K/UL (ref 0–0.4)
EOSINOPHIL NFR BLD: 0 % (ref 0–7)
ERYTHROCYTE [DISTWIDTH] IN BLOOD BY AUTOMATED COUNT: 20.1 % (ref 11.5–14.5)
GLOBULIN SER CALC-MCNC: 3.7 G/DL (ref 2–4)
GLUCOSE SERPL-MCNC: 138 MG/DL (ref 65–100)
HCT VFR BLD AUTO: 28 % (ref 36.6–50.3)
HGB BLD-MCNC: 8.2 G/DL (ref 12.1–17)
IMM GRANULOCYTES # BLD AUTO: 0.1 K/UL (ref 0–0.04)
IMM GRANULOCYTES NFR BLD AUTO: 1 % (ref 0–0.5)
LYMPHOCYTES # BLD: 0.7 K/UL (ref 0.8–3.5)
LYMPHOCYTES NFR BLD: 4 % (ref 12–49)
MCH RBC QN AUTO: 20.4 PG (ref 26–34)
MCHC RBC AUTO-ENTMCNC: 29.3 G/DL (ref 30–36.5)
MCV RBC AUTO: 69.7 FL (ref 80–99)
MONOCYTES # BLD: 0.7 K/UL (ref 0–1)
MONOCYTES NFR BLD: 5 % (ref 5–13)
NEUTS SEG # BLD: 13.3 K/UL (ref 1.8–8)
NEUTS SEG NFR BLD: 90 % (ref 32–75)
NRBC # BLD: 0.06 K/UL (ref 0–0.01)
NRBC BLD-RTO: 0.4 PER 100 WBC
PLATELET # BLD AUTO: 391 K/UL (ref 150–400)
POTASSIUM SERPL-SCNC: 3.8 MMOL/L (ref 3.5–5.1)
PROT SERPL-MCNC: 6.5 G/DL (ref 6.4–8.2)
RBC # BLD AUTO: 4.02 M/UL (ref 4.1–5.7)
SODIUM SERPL-SCNC: 136 MMOL/L (ref 136–145)
SPECIMEN EXP DATE BLD: NORMAL
STATUS OF UNIT,%ST: NORMAL
TRANSFUSION STATUS PATIENT QL: NORMAL
UNIT DIVISION, %UDIV: 0
WBC # BLD AUTO: 14.7 K/UL (ref 4.1–11.1)

## 2021-11-17 PROCEDURE — 74011250637 HC RX REV CODE- 250/637: Performed by: INTERNAL MEDICINE

## 2021-11-17 PROCEDURE — 76060000032 HC ANESTHESIA 0.5 TO 1 HR: Performed by: INTERNAL MEDICINE

## 2021-11-17 PROCEDURE — 85025 COMPLETE CBC W/AUTO DIFF WBC: CPT

## 2021-11-17 PROCEDURE — 80053 COMPREHEN METABOLIC PANEL: CPT

## 2021-11-17 PROCEDURE — 74011250636 HC RX REV CODE- 250/636: Performed by: INTERNAL MEDICINE

## 2021-11-17 PROCEDURE — 94640 AIRWAY INHALATION TREATMENT: CPT

## 2021-11-17 PROCEDURE — 97161 PT EVAL LOW COMPLEX 20 MIN: CPT

## 2021-11-17 PROCEDURE — 74011000250 HC RX REV CODE- 250: Performed by: ANESTHESIOLOGY

## 2021-11-17 PROCEDURE — 74011000250 HC RX REV CODE- 250: Performed by: FAMILY MEDICINE

## 2021-11-17 PROCEDURE — 76040000007: Performed by: INTERNAL MEDICINE

## 2021-11-17 PROCEDURE — 65270000029 HC RM PRIVATE

## 2021-11-17 PROCEDURE — 74011250636 HC RX REV CODE- 250/636: Performed by: FAMILY MEDICINE

## 2021-11-17 PROCEDURE — 36415 COLL VENOUS BLD VENIPUNCTURE: CPT

## 2021-11-17 PROCEDURE — 0DJ08ZZ INSPECTION OF UPPER INTESTINAL TRACT, VIA NATURAL OR ARTIFICIAL OPENING ENDOSCOPIC: ICD-10-PCS | Performed by: INTERNAL MEDICINE

## 2021-11-17 PROCEDURE — 74011250636 HC RX REV CODE- 250/636: Performed by: ANESTHESIOLOGY

## 2021-11-17 PROCEDURE — 74011250637 HC RX REV CODE- 250/637: Performed by: FAMILY MEDICINE

## 2021-11-17 PROCEDURE — 74011000258 HC RX REV CODE- 258: Performed by: FAMILY MEDICINE

## 2021-11-17 PROCEDURE — 74011250636 HC RX REV CODE- 250/636: Performed by: NURSE PRACTITIONER

## 2021-11-17 PROCEDURE — 97530 THERAPEUTIC ACTIVITIES: CPT

## 2021-11-17 PROCEDURE — 2709999900 HC NON-CHARGEABLE SUPPLY: Performed by: INTERNAL MEDICINE

## 2021-11-17 RX ORDER — PROPOFOL 10 MG/ML
INJECTION, EMULSION INTRAVENOUS AS NEEDED
Status: DISCONTINUED | OUTPATIENT
Start: 2021-11-17 | End: 2021-11-17 | Stop reason: HOSPADM

## 2021-11-17 RX ORDER — SODIUM CHLORIDE 9 MG/ML
INJECTION, SOLUTION INTRAVENOUS
Status: DISCONTINUED | OUTPATIENT
Start: 2021-11-17 | End: 2021-11-17 | Stop reason: HOSPADM

## 2021-11-17 RX ORDER — SODIUM CHLORIDE 9 MG/ML
75 INJECTION, SOLUTION INTRAVENOUS CONTINUOUS
Status: DISCONTINUED | OUTPATIENT
Start: 2021-11-17 | End: 2021-11-19 | Stop reason: HOSPADM

## 2021-11-17 RX ORDER — SODIUM CHLORIDE 0.9 % (FLUSH) 0.9 %
5-40 SYRINGE (ML) INJECTION AS NEEDED
Status: DISCONTINUED | OUTPATIENT
Start: 2021-11-17 | End: 2021-11-19 | Stop reason: HOSPADM

## 2021-11-17 RX ORDER — SODIUM CHLORIDE 0.9 % (FLUSH) 0.9 %
5-40 SYRINGE (ML) INJECTION EVERY 8 HOURS
Status: CANCELLED | OUTPATIENT
Start: 2021-11-17

## 2021-11-17 RX ORDER — PROPOFOL 10 MG/ML
INJECTION, EMULSION INTRAVENOUS
Status: COMPLETED
Start: 2021-11-17 | End: 2021-11-17

## 2021-11-17 RX ORDER — NORETHINDRONE AND ETHINYL ESTRADIOL 0.5-0.035
KIT ORAL
Status: DISPENSED
Start: 2021-11-17 | End: 2021-11-17

## 2021-11-17 RX ORDER — POLYETHYLENE GLYCOL 3350, SODIUM SULFATE, SODIUM CHLORIDE, POTASSIUM CHLORIDE, SODIUM ASCORBATE, AND ASCORBIC ACID 7.5-2.691G
1 KIT ORAL ONCE
Status: DISCONTINUED | OUTPATIENT
Start: 2021-11-17 | End: 2021-11-17

## 2021-11-17 RX ORDER — FUROSEMIDE 40 MG/1
40 TABLET ORAL DAILY
Status: DISCONTINUED | OUTPATIENT
Start: 2021-11-18 | End: 2021-11-19 | Stop reason: HOSPADM

## 2021-11-17 RX ORDER — EPHEDRINE SULFATE/0.9% NACL/PF 50 MG/5 ML
SYRINGE (ML) INTRAVENOUS AS NEEDED
Status: DISCONTINUED | OUTPATIENT
Start: 2021-11-17 | End: 2021-11-17 | Stop reason: HOSPADM

## 2021-11-17 RX ORDER — SODIUM CHLORIDE 0.9 % (FLUSH) 0.9 %
5-40 SYRINGE (ML) INJECTION EVERY 8 HOURS
Status: DISCONTINUED | OUTPATIENT
Start: 2021-11-17 | End: 2021-11-19 | Stop reason: HOSPADM

## 2021-11-17 RX ORDER — SODIUM CHLORIDE 0.9 % (FLUSH) 0.9 %
5-40 SYRINGE (ML) INJECTION AS NEEDED
Status: CANCELLED | OUTPATIENT
Start: 2021-11-17

## 2021-11-17 RX ADMIN — FAMOTIDINE 20 MG: 20 TABLET, FILM COATED ORAL at 20:49

## 2021-11-17 RX ADMIN — PANTOPRAZOLE SODIUM 40 MG: 40 TABLET, DELAYED RELEASE ORAL at 09:59

## 2021-11-17 RX ADMIN — SODIUM CHLORIDE 75 ML/HR: 9 INJECTION, SOLUTION INTRAVENOUS at 10:00

## 2021-11-17 RX ADMIN — PROPOFOL 50 MG: 10 INJECTION, EMULSION INTRAVENOUS at 09:03

## 2021-11-17 RX ADMIN — PIPERACILLIN SODIUM AND TAZOBACTAM SODIUM 3.38 G: 3; .375 INJECTION, POWDER, LYOPHILIZED, FOR SOLUTION INTRAVENOUS at 13:00

## 2021-11-17 RX ADMIN — Medication 10 ML: at 20:50

## 2021-11-17 RX ADMIN — METHYLPREDNISOLONE SODIUM SUCCINATE 40 MG: 40 INJECTION, POWDER, FOR SOLUTION INTRAMUSCULAR; INTRAVENOUS at 20:48

## 2021-11-17 RX ADMIN — FAMOTIDINE 20 MG: 20 TABLET, FILM COATED ORAL at 09:59

## 2021-11-17 RX ADMIN — Medication 10 MG: at 09:03

## 2021-11-17 RX ADMIN — Medication 10 ML: at 10:01

## 2021-11-17 RX ADMIN — TRAMADOL HYDROCHLORIDE 50 MG: 50 TABLET, COATED ORAL at 20:48

## 2021-11-17 RX ADMIN — BUDESONIDE AND FORMOTEROL FUMARATE DIHYDRATE 2 PUFF: 160; 4.5 AEROSOL RESPIRATORY (INHALATION) at 19:28

## 2021-11-17 RX ADMIN — TRAZODONE HYDROCHLORIDE 25 MG: 50 TABLET ORAL at 20:50

## 2021-11-17 RX ADMIN — Medication 10 ML: at 15:35

## 2021-11-17 RX ADMIN — PROPOFOL 50 MG: 10 INJECTION, EMULSION INTRAVENOUS at 09:05

## 2021-11-17 RX ADMIN — SODIUM CHLORIDE: 9 INJECTION, SOLUTION INTRAVENOUS at 08:41

## 2021-11-17 RX ADMIN — SODIUM CHLORIDE 75 ML/HR: 9 INJECTION, SOLUTION INTRAVENOUS at 20:48

## 2021-11-17 RX ADMIN — PIPERACILLIN SODIUM AND TAZOBACTAM SODIUM 3.38 G: 3; .375 INJECTION, POWDER, LYOPHILIZED, FOR SOLUTION INTRAVENOUS at 05:17

## 2021-11-17 RX ADMIN — METHYLPREDNISOLONE SODIUM SUCCINATE 40 MG: 40 INJECTION, POWDER, FOR SOLUTION INTRAMUSCULAR; INTRAVENOUS at 15:35

## 2021-11-17 RX ADMIN — METHYLPREDNISOLONE SODIUM SUCCINATE 40 MG: 40 INJECTION, POWDER, FOR SOLUTION INTRAMUSCULAR; INTRAVENOUS at 05:17

## 2021-11-17 RX ADMIN — PIPERACILLIN SODIUM AND TAZOBACTAM SODIUM 3.38 G: 3; .375 INJECTION, POWDER, LYOPHILIZED, FOR SOLUTION INTRAVENOUS at 20:03

## 2021-11-17 NOTE — PROGRESS NOTES
PT consult received and eval attempted. Pt currently off the floor at [facility] for EGD. PT will attempt again at a later time.

## 2021-11-17 NOTE — PROGRESS NOTES
General Daily Progress Note          Patient Name:   Isauro Mccloud       YOB: 1940       Age:  [de-identified] y.o. Admit Date: 11/15/2021      Subjective:     Landry Palumbo y. o. male with a past medical history significant for coronary disease s/p PCI, dilated cardiomyopathy, afib, alcohol abuse, current smoker 2ppd. On 11/15/2021, presented to the ED with chief complaint of Shortness of Breath and sharp non-exertional non-radiating chest pain. On ED work-up was found to be in afib with elevated troponin and elevated BNP. Concern for NSTEMI. Admitted for further evaluation and treatment.       11/17:  Last night, tele called reporting patient has dropped down to 48 a few times yesterday and comes right back up to baseline rate. Patient asymptomatic. Resting in bed, no complaints. Last night range was 56-89. Patient seen and examined. S/p EGD this morning. Awaiting results. He is sitting up in bed, eating his breakfast. On room air. Presently has no complaints. Denies headache, dizziness, chest pain, shortness of breath, abdominal pain, nausea, vomiting, changes in bowel/bladder, fevers, or chills. Scheduled for colonoscopy tomorrow.     Objective:     Visit Vitals  /71 (BP 1 Location: Left upper arm)   Pulse 63   Temp 98.6 °F (37 °C)   Resp 19   Ht 5' 11.5\" (1.816 m)   Wt 76.9 kg (169 lb 8.5 oz)   SpO2 98%   BMI 23.32 kg/m²        Recent Results (from the past 24 hour(s))   METABOLIC PANEL, COMPREHENSIVE    Collection Time: 11/17/21  4:20 AM   Result Value Ref Range    Sodium 136 136 - 145 mmol/L    Potassium 3.8 3.5 - 5.1 mmol/L    Chloride 104 97 - 108 mmol/L    CO2 25 21 - 32 mmol/L    Anion gap 7 5 - 15 mmol/L    Glucose 138 (H) 65 - 100 mg/dL    BUN 30 (H) 6 - 20 mg/dL    Creatinine 1.38 (H) 0.70 - 1.30 mg/dL    BUN/Creatinine ratio 22 (H) 12 - 20      GFR est AA >60 >60 ml/min/1.73m2    GFR est non-AA 50 (L) >60 ml/min/1.73m2    Calcium 8.8 8.5 - 10.1 mg/dL    Bilirubin, total 0.5 0.2 - 1.0 mg/dL    AST (SGOT) 17 15 - 37 U/L    ALT (SGPT) 14 12 - 78 U/L    Alk. phosphatase 68 45 - 117 U/L    Protein, total 6.5 6.4 - 8.2 g/dL    Albumin 2.8 (L) 3.5 - 5.0 g/dL    Globulin 3.7 2.0 - 4.0 g/dL    A-G Ratio 0.8 (L) 1.1 - 2.2     CBC WITH AUTOMATED DIFF    Collection Time: 11/17/21  4:20 AM   Result Value Ref Range    WBC 14.7 (H) 4.1 - 11.1 K/uL    RBC 4.02 (L) 4.10 - 5.70 M/uL    HGB 8.2 (L) 12.1 - 17.0 g/dL    HCT 28.0 (L) 36.6 - 50.3 %    MCV 69.7 (L) 80.0 - 99.0 FL    MCH 20.4 (L) 26.0 - 34.0 PG    MCHC 29.3 (L) 30.0 - 36.5 g/dL    RDW 20.1 (H) 11.5 - 14.5 %    PLATELET 372 874 - 356 K/uL    NRBC 0.4 (H) 0.0  WBC    ABSOLUTE NRBC 0.06 (H) 0.00 - 0.01 K/uL    NEUTROPHILS 90 (H) 32 - 75 %    LYMPHOCYTES 4 (L) 12 - 49 %    MONOCYTES 5 5 - 13 %    EOSINOPHILS 0 0 - 7 %    BASOPHILS 0 0 - 1 %    IMMATURE GRANULOCYTES 1 (H) 0 - 0.5 %    ABS. NEUTROPHILS 13.3 (H) 1.8 - 8.0 K/UL    ABS. LYMPHOCYTES 0.7 (L) 0.8 - 3.5 K/UL    ABS. MONOCYTES 0.7 0.0 - 1.0 K/UL    ABS. EOSINOPHILS 0.0 0.0 - 0.4 K/UL    ABS. BASOPHILS 0.0 0.0 - 0.1 K/UL    ABS. IMM. GRANS. 0.1 (H) 0.00 - 0.04 K/UL    DF AUTOMATED             Review of Systems    Constitutional: Negative for chills and fever. HENT: Negative. Eyes: Negative. Respiratory: Negative. Cardiovascular: Negative. Gastrointestinal: Negative for abdominal pain and nausea. Skin: Negative. Neurological: Negative. Physical Exam:      Constitutional: Elderly male, sitting up in bed, in no acute distress. HENT:   Head: Normocephalic and atraumatic. Eyes: Pupils are equal, round, and reactive to light. EOM are normal.   Cardiovascular: Normal rate, regular rhythm and normal heart sounds. Pulmonary/Chest: Breath sounds normal. No wheezes. No rales. Exhibits no tenderness. Abdominal: Soft. Bowel sounds are normal. There is no abdominal tenderness. There is no rebound and no guarding. Musculoskeletal: Normal range of motion. Neurological: pt is alert and oriented to person, place, and time. XR CHEST SNGL V   Final Result   1. Bilateral pleural effusions with bilateral airspace disease or edema. 2. Multiple right-sided rib fractures, probably old, recommend follow-up as   warranted. Recent Results (from the past 24 hour(s))   METABOLIC PANEL, COMPREHENSIVE    Collection Time: 11/17/21  4:20 AM   Result Value Ref Range    Sodium 136 136 - 145 mmol/L    Potassium 3.8 3.5 - 5.1 mmol/L    Chloride 104 97 - 108 mmol/L    CO2 25 21 - 32 mmol/L    Anion gap 7 5 - 15 mmol/L    Glucose 138 (H) 65 - 100 mg/dL    BUN 30 (H) 6 - 20 mg/dL    Creatinine 1.38 (H) 0.70 - 1.30 mg/dL    BUN/Creatinine ratio 22 (H) 12 - 20      GFR est AA >60 >60 ml/min/1.73m2    GFR est non-AA 50 (L) >60 ml/min/1.73m2    Calcium 8.8 8.5 - 10.1 mg/dL    Bilirubin, total 0.5 0.2 - 1.0 mg/dL    AST (SGOT) 17 15 - 37 U/L    ALT (SGPT) 14 12 - 78 U/L    Alk. phosphatase 68 45 - 117 U/L    Protein, total 6.5 6.4 - 8.2 g/dL    Albumin 2.8 (L) 3.5 - 5.0 g/dL    Globulin 3.7 2.0 - 4.0 g/dL    A-G Ratio 0.8 (L) 1.1 - 2.2     CBC WITH AUTOMATED DIFF    Collection Time: 11/17/21  4:20 AM   Result Value Ref Range    WBC 14.7 (H) 4.1 - 11.1 K/uL    RBC 4.02 (L) 4.10 - 5.70 M/uL    HGB 8.2 (L) 12.1 - 17.0 g/dL    HCT 28.0 (L) 36.6 - 50.3 %    MCV 69.7 (L) 80.0 - 99.0 FL    MCH 20.4 (L) 26.0 - 34.0 PG    MCHC 29.3 (L) 30.0 - 36.5 g/dL    RDW 20.1 (H) 11.5 - 14.5 %    PLATELET 032 945 - 227 K/uL    NRBC 0.4 (H) 0.0  WBC    ABSOLUTE NRBC 0.06 (H) 0.00 - 0.01 K/uL    NEUTROPHILS 90 (H) 32 - 75 %    LYMPHOCYTES 4 (L) 12 - 49 %    MONOCYTES 5 5 - 13 %    EOSINOPHILS 0 0 - 7 %    BASOPHILS 0 0 - 1 %    IMMATURE GRANULOCYTES 1 (H) 0 - 0.5 %    ABS. NEUTROPHILS 13.3 (H) 1.8 - 8.0 K/UL    ABS. LYMPHOCYTES 0.7 (L) 0.8 - 3.5 K/UL    ABS. MONOCYTES 0.7 0.0 - 1.0 K/UL    ABS. EOSINOPHILS 0.0 0.0 - 0.4 K/UL    ABS. BASOPHILS 0.0 0.0 - 0.1 K/UL    ABS. IMM.  GRANS. 0.1 (H) 0.00 - 0.04 K/UL    DF AUTOMATED         Results     Procedure Component Value Units Date/Time    COVID-19 RAPID TEST [[id number]] Collected: 11/15/21 2100    Order Status: Completed Specimen: Nasopharyngeal Updated: 11/15/21 2148     Specimen source       Please find results under separate order           COVID-19 rapid test Not Detected        Comment: Rapid Abbott ID Now   Rapid NAAT:  The specimen is NEGATIVE for SARS-CoV-2, the novel coronavirus associated with COVID-19. Negative results should be treated as presumptive and, if inconsistent with clinical signs and symptoms or necessary for patient management, should be tested with an alternative molecular assay. Negative results do not preclude SARS-CoV-2 infection and should not be used as the sole basis for patient management decisions. This test has been authorized by the FDA under   an Emergency Use Authorization (EUA) for use by authorized laboratories.  Fact sheet for Healthcare Providers: Procyriondate.co.nz Fact sheet for Patients: Procyriondate.co.nz   Methodology: Isothermal Nucleic Acid Amplification         CULTURE, BLOOD #1 [[id number]]     Order Status: Sent Specimen: Blood     CULTURE, BLOOD #2 [[id number]]     Order Status: Sent Specimen: Blood     CULTURE, BLOOD, PAIRED [[id number]] Collected: 11/15/21 1200    Order Status: Completed Specimen: Blood Updated: 11/17/21 0951     Special Requests: No Special Requests        Culture result: No growth 2 days       CULTURE, BLOOD [[id number]]     Order Status: Sent Specimen: Blood            Labs:     Recent Labs     11/17/21 0420 11/16/21 0423   WBC 14.7* 11.4*   HGB 8.2* 6.8*   HCT 28.0* 24.0*    390     Recent Labs     11/17/21  0420 11/16/21  0423 11/15/21  2147    138 139   K 3.8 4.1 3.5    104 105   CO2 25 28 28   BUN 30* 19 13   CREA 1.38* 1.19 1.17   * 143* 202*   CA 8.8 8.6 8.6     Recent Labs     11/17/21 0420 11/16/21 0423 11/15/21  0935   ALT 14 14 14   AP 68 75 85   TBILI 0.5 0.5 0.5   TP 6.5 6.3* 6.8   ALB 2.8* 3.2* 3.4*   GLOB 3.7 3.1 3.4     No results for input(s): INR, PTP, APTT, INREXT, INREXT in the last 72 hours. No results for input(s): FE, TIBC, PSAT, FERR in the last 72 hours. No results found for: FOL, RBCF   No results for input(s): PH, PCO2, PO2 in the last 72 hours. No results for input(s): CPK, CKNDX, TROIQ in the last 72 hours. No lab exists for component: CPKMB  No results found for: CHOL, CHOLX, CHLST, CHOLV, HDL, HDLP, LDL, LDLC, DLDLP, TGLX, TRIGL, TRIGP, CHHD, CHHDX  No results found for: [facility]  Lab Results   Component Value Date/Time    Color Yellow/Straw 11/15/2021 09:35 AM    Appearance Clear 11/15/2021 09:35 AM    Specific gravity 1.016 11/15/2021 09:35 AM    pH (UA) 5.0 11/15/2021 09:35 AM    Protein 30 (A) 11/15/2021 09:35 AM    Glucose Negative 11/15/2021 09:35 AM    Ketone Negative 11/15/2021 09:35 AM    Bilirubin Negative 11/15/2021 09:35 AM    Urobilinogen 2.0 (H) 11/15/2021 09:35 AM    Nitrites Negative 11/15/2021 09:35 AM    Leukocyte Esterase Negative 11/15/2021 09:35 AM    Bacteria Negative 11/15/2021 09:35 AM    WBC 0-4 11/15/2021 09:35 AM    RBC 0-5 11/15/2021 09:35 AM         Assessment:     Anemia d/t GI bleed  · 11/16: S/p transfusion 1 unit PRBC  · S/p EGD today. Showed gastritis chronic   · scheduled for colonoscopy tomorrow. Congestive heart failure with reduced EF  · CXR:  Bilateral pleural effusions with bilateral airspace disease or edema. Multiple right-sided rib fractures, probably old, recommend follow-up as warranted.     Atrial Fibrillation with RVR now bradycardia  · On metoprolol for rate control and aspirin 81mg daily.      Bradycardia     Pneumonia     Acute hypoxic respiratory failure    COPD with severe acute exacerbation, poor airway clearance    GERD    HTN     Hyperlipidemia     Alcohol Abuse     Nicotine dependence     Frequent falls    Prerenal azotemia     Plan:     Medications  · Aspirin 81 mg PO daily  · Symbicort BID  · Famotidine 20 mg PO BID  · Change Lasix to p.o.  · Solu-Medrol IV 40 mg Q8H  · Metoprolol succinate 25 mg PO daily hold due to bradycardia  · Pantoprazole 40 mg PO daily  · Zosyn IV Q8H  · Trazodone 25 mg PO daily  · Normal saline infusion      Labs/Studies  · Colonoscopy tomorrow. · Awaiting echocardiogram      Consults:  Cardiology: Change Lasix to p.o. strict I/O monitoring. Hold beta-blocker for bradycardia  GI: Continue medications. Cardiology and pulmonary input. PT: Will reattempt. Patient was [facility] for EGD. Pulmonary: oxygen via NC. Patient declining to use oxygen and inhalers at home. Patient declined smoking cessation.     Monitor renal function closely  Discharge: to [facility]        Current Facility-Administered Medications:     0.9% sodium chloride infusion, 75 mL/hr, IntraVENous, CONTINUOUS, Rosiland Royal F, NP, Last Rate: 75 mL/hr at 11/17/21 1000, 75 mL/hr at 11/17/21 1000    sodium chloride (NS) flush 5-40 mL, 5-40 mL, IntraVENous, Q8H, Jimenez, Sharon F, NP, 10 mL at 11/17/21 1001    sodium chloride (NS) flush 5-40 mL, 5-40 mL, IntraVENous, PRN, Cy Folk, NP    ePHEDrine sulfate (AKOVAZ) 50 mg/mL injection, , , ,     peg 3350-Electrolytes-Vit C (MOVIPREP) oral solution 1 L, 1 L, Oral, ONCE, Carine Ovalles MD    budesonide-formoteroL [facility]) 160-4.5 mcg/actuation HFA inhaler 2 Puff, 2 Puff, Inhalation, BID RT, Wendy Apodaca MD, 2 Puff at 11/16/21 2003    0.9% sodium chloride infusion 250 mL, 250 mL, IntraVENous, PRN, Jose Santana MD    methylPREDNISolone (PF) (SOLU-MEDROL) injection 40 mg, 40 mg, IntraVENous, Q8H, Jose Santana MD, 40 mg at 11/17/21 0517    pantoprazole (PROTONIX) tablet 40 mg, 40 mg, Oral, DAILY, Wendy Apodaca MD, 40 mg at 11/17/21 0959    0.9% sodium chloride infusion 250 mL, 250 mL, IntraVENous, PRN, Roldan Ha MD  [facility] famotidine (PEPCID) tablet 20 mg, 20 mg, Oral, BID, Wendy Apodaca MD, 20 mg at 11/17/21 0959    traMADoL (ULTRAM) tablet 50 mg, 50 mg, Oral, Q6H PRN, Wendy Apodaca MD, 50 mg at 11/16/21 2214    glucagon (GLUCAGEN) injection 1 mg, 1 mg, IntraVENous, PRN, Brent Jimenez MD    acetaminophen (TYLENOL) tablet 650 mg, 650 mg, Oral, Q6H PRN **OR** acetaminophen (TYLENOL) suppository 650 mg, 650 mg, Rectal, Q6H PRN, Lorrie Apodaca MD    polyethylene glycol (MIRALAX) packet 17 g, 17 g, Oral, DAILY PRN, Lorrie Apodaca MD    ondansetron (ZOFRAN ODT) tablet 4 mg, 4 mg, Oral, Q8H PRN **OR** ondansetron (ZOFRAN) injection 4 mg, 4 mg, IntraVENous, Q6H PRN, Wendy Apodaca MD    piperacillin-tazobactam (ZOSYN) 3.375 g in 0.9% sodium chloride (MBP/ADV) 100 mL MBP, 3.375 g, IntraVENous, Q8H, Wendy Apodaca MD, Last Rate: 200 mL/hr at 11/17/21 0517, 3.375 g at 11/17/21 0517    furosemide (LASIX) injection 40 mg, 40 mg, IntraVENous, BID, Wendy Apodaca MD, 40 mg at 11/16/21 2218    [Held by provider] aspirin chewable tablet 81 mg, 81 mg, Oral, DAILY, Saniya Tamayo MD, 81 mg at 11/16/21 0810    albuterol (PROVENTIL HFA, VENTOLIN HFA, PROAIR HFA) inhaler 2 Puff, 2 Puff, Inhalation, Q4H PRN, Jose Santana MD    influenza vaccine 2021-22 (6 mos+)(PF) (FLUARIX/FLULAVAL/FLUZONE QUAD) injection 0.5 mL, 1 Each, IntraMUSCular, PRIOR TO DISCHARGE, Wendy Apodaca MD    traZODone (DESYREL) tablet 25 mg, 25 mg, Oral, QHS, Al-Saniya Monzon MD, 25 mg at 11/16/21 6677

## 2021-11-17 NOTE — ANESTHESIA PREPROCEDURE EVALUATION
Relevant Problems   RESPIRATORY SYSTEM   (+) PNA (pneumonia)      CARDIOVASCULAR   (+) CHF (congestive heart failure) (HCC)       Anesthetic History   No history of anesthetic complications            Review of Systems / Medical History  Patient summary reviewed, nursing notes reviewed and pertinent labs reviewed    Pulmonary    COPD      Pneumonia and smoker      Comments:  COUGH    CXR (11-15-21) IMPRESSION  1. Bilateral pleural effusions with bilateral airspace disease or edema. 2. Multiple right-sided rib fractures, probably old, recommend follow-up as  warranted. Neuro/Psych              Cardiovascular    Hypertension      CHF  Dysrhythmias (Bradyarrythmias per Telemetry. ) : atrial fibrillation  Cardiac stents      Comments: ECG (11/15/2021)  Atrial fibrillation   Nonspecific T wave abnormality   Abnormal ECG   No previous ECGs available. GI/Hepatic/Renal     GERD    Renal disease: CRI      Comments: ETOH ABUSE. Endo/Other        Anemia (Hb/Hct 8.2/28.0)    Comments: Hx OF FREQUENT FALLS. Other Findings            Physical Exam    Airway  Mallampati: II  TM Distance: 4 - 6 cm  Neck ROM: normal range of motion   Mouth opening: Normal    Comments: BEARD Cardiovascular    Rhythm: irregular  Rate: normal      Pertinent negatives: No murmur   Dental    Dentition: Poor dentition  Comments: MULTIPLE MISSING TEETH.     Pulmonary  Breath sounds clear to auscultation               Abdominal  GI exam deferred       Other Findings            Anesthetic Plan    ASA: 3, emergent  Anesthesia type: MAC and total IV anesthesia          Induction: Intravenous  Anesthetic plan and risks discussed with: Patient

## 2021-11-17 NOTE — PROGRESS NOTES
PHYSICAL THERAPY EVALUATION  Patient: Marnie Sibley (42 y.o. male)  Date: 11/17/2021  Primary Diagnosis: CHF (congestive heart failure) (HCC) [I50.9]  PNA (pneumonia) [J18.9]  Procedure(s) (LRB):  ESOPHAGOGASTRODUODENOSCOPY (EGD) (N/A) Day of Surgery   Precautions:      ASSESSMENT  Marnie Sibley, [de-identified] y.o. male with a past medical history significant weak heart presents to the ED with chief complaint of Shortness of Breath  . 78-year-old male has a known weak heart. Does not follow with cardiology. Says he has been having worsening shortness of breath has been progressive over the last 1 to 2 weeks. Occasional coughing which she normally has. Unsure about any fevers. Is feeling weaker. No new swelling. No nausea vomiting diarrhea. Some breathing that is been bothering him. Not related to position or exertion. Ricki Bailey Pt A&O x 4. Per patient  pt resides  in a [facility], pt was indep for ADLS/IADLS, Rw AD with mobility prior to admission. Patient reports recently he was getting weaker. Based on the objective data described below, the patient presents with generalized weakness, impaired functional mobility, impaired amb, impaired balance, and endurance. Pt supine upon PT arrival, agreeable to evaluation. Pt required cg to min assist for bed mobility,  supine <> sit,  sit <> stand transfers. Pt amb 3 feet with gt belt, RW, and min; demonstrating slow gt pattern with off balance noted. Pt did fair with session today with PT. Pt will benefit from continued skilled PT to address above deficits and return to PLOF. Current PT DC recommendation snf due to above deficits. Current Level of Function Impacting Discharge (mobility/balance):patient required cg to min assist for supine to sit to stand and unable to walk further due to feeling weak and tired today. he was indep and now decreased functional level.   Other factors to consider for discharge: SNF     PLAN :  Recommendations and Planned Interventions: bed mobility training, transfer training, gait training, therapeutic exercises, patient and family training/education, and therapeutic activities      Recommend with staff: bsc    Frequency/Duration: Patient will be followed by physical therapy:  3-5x/week to address goals. Recommendation for discharge: (in order for the patient to meet his/her long term goals)  Antoine Prado    This discharge recommendation:  Has been made in collaboration with the attending provider and/or case management    IF patient discharges home will need the following DME: patient owns DME required for discharge         SUBJECTIVE:   Patient stated i am ok.     OBJECTIVE DATA SUMMARY:   HISTORY:    Past Medical History:   Diagnosis Date    GERD (gastroesophageal reflux disease)     Hypertension    History reviewed. No pertinent surgical history. Home Situation  Home Environment: Assisted living  One/Two Story Residence: One story  Living Alone: No  Support Systems: Assisted Living  Patient Expects to be Discharged to[de-identified] Assisted living  Current DME Used/Available at Home: Walker, rolling    EXAMINATION/PRESENTATION/DECISION MAKING:   Critical Behavior:  Neurologic State: Alert  Orientation Level: Disoriented to place        Hearing: Auditory  Auditory Impairment: Hard of hearing, bilateral  Range Of Motion:  AROM: Generally decreased, functional                       Strength:    Strength: Generally decreased, functional                    Tone & Sensation:   Tone: Normal                              Functional Mobility:  Bed Mobility:  Rolling: Stand-by assistance  Supine to Sit: Stand-by assistance  Sit to Supine: Stand-by assistance  Scooting: Stand-by assistance  Transfers:  Sit to Stand: Minimum assistance  Stand to Sit: Minimum assistance                       Balance:   Sitting: Intact  Standing: Impaired;  With support  Standing - Static: Fair; Constant support  Standing - Dynamic : Fair; Constant support  Ambulation/Gait Training:  Distance (ft): 3 Feet (ft)  Assistive Device: Gait belt; Walker, rolling  Ambulation - Level of Assistance: Minimal assistance     Gait Description (WDL): Exceptions to WDL           Base of Support: Narrowed     Speed/Mela: Shuffled                       Functional Measure:  [address] Short Form  How much difficulty does the patient currently have. .. Unable A Lot A Little None   1. Turning over in bed (including adjusting bedclothes, sheets and blankets)? [] 1   [] 2   [x] 3   [] 4   2. Sitting down on and standing up from a chair with arms ( e.g., wheelchair, bedside commode, etc.)   [] 1   [] 2   [x] 3   [] 4   3. Moving from lying on back to sitting on the side of the bed? [] 1   [] 2   [x] 3   [] 4          How much help from another person does the patient currently need. .. Total A Lot A Little None   4. Moving to and from a bed to a chair (including a wheelchair)? [] 1   [] 2   [x] 3   [] 4   5. Need to walk in hospital room? [] 1   [x] 2   [] 3   [] 4   6. Climbing 3-5 steps with a railing? [] 1   [x] 2   [] 3   [] 4   © 2007, Trustees of [address] [place] [address] [facility], under license to [employer]. All rights reserved     Score:  Initial: 16/24 Most Recent: X (Date: 11/17/2021 )   Interpretation of Tool:  Represents activities that are increasingly more difficult (i.e. Bed mobility, Transfers, Gait).   Score 24 23 22-20 19-15 14-10 9-7 6   Modifier CH CI CJ CK CL CM CN         Physical Therapy Evaluation Charge Determination   History Examination Presentation Decision-Making   LOW Complexity : Zero comorbidities / personal factors that will impact the outcome / POC LOW Complexity : 1-2 Standardized tests and measures addressing body structure, function, activity limitation and / or participation in recreation  LOW Complexity : Stable, uncomplicated  Other outcome measures [facility] 6  16/24      Based on the above components, the patient evaluation is determined to be of the following complexity level: LOW     Pain Ratin/10   Activity Tolerance:   Good    After treatment patient left in no apparent distress:   Supine in bed, Call bell within reach, and Bed / chair alarm activated . GOALS:    Problem: Mobility Impaired (Adult and Pediatric)  Goal: *Acute Goals and Plan of Care (Insert Text)  Description: Patient will move from supine to sit and sit to supine , scoot up and down, and roll side to side in bed with independence within 7 day(s). Patient will transfer from bed to chair and chair to bed with independence using the least restrictive device within 7 day(s). Patient will improve static standing balance to independence within 1 week(s). Patient will ambulate 75 feet with independence with least restrictive device within 1 weeks. Outcome: Progressing Towards Goal       COMMUNICATION/EDUCATION:   The patients plan of care was discussed with: Registered nurse. Fall prevention education was provided and the patient/caregiver indicated understanding., Patient/family have participated as able in goal setting and plan of care. , and Patient/family agree to work toward stated goals and plan of care.          Thank you for this referral.  Shira Cortes, PT   Time Calculation: 23 mins

## 2021-11-17 NOTE — PROGRESS NOTES
Progress Note    Patient: Ailyn Butterfield MRN: [id number]  SSN: [id number]    YOB: 1940  Age: [de-identified] y.o. Sex: male      Admit Date: 11/15/2021    LOS: 2 days     Subjective:   GI in consultation for GI bleed    Mr. Caroline Ledesma seen status post EGD which shows gastritis without bleeding. Recommend Clear liquid diet. Continue present PPI therapy, No anticoagulants till follows up as outpatient. Scheduled for  Colonoscopy on Friday. He denies nausea, vomiting, abdominal pain, diarrhea, or constipation. He did receive 1 unit of PRBC's on 11/16/21 with improved hgB at 8.2 this am. He is currently on room air. History of Present Illness: Ailyn Butterfield is a [de-identified] y.o. male who is seen in consultation for GI Bleed. The patient has a past medical history significant for CAD s/p PCI, dilated cardiomyopathy, CHFrEF, atrial fibrillation,. Admits to smoking 2 PPD of cigarettes since the age of 7, and drinks  A 40 oz beer daily. His last drink was about a week ago. Mr. Caroline Ledesma presented to the ED with complaints of shortness of breath which has been progressively worsening over the past 2 weeks and increased weakness. He denies nausea and vomiting. He does report his appetite has been poor. His last colonoscopy was 2 years ago and he reports they removed 4 polyps. His stool for occult blood is pending. He reports his bowel movements are normal but they have have been dark in color. On admission his hgB noted to at 6.8. He does have one unit infusing at time of exam. His EKG on admission shows Afib. His heart rate is controlled at this time. proBNP elevated at 7.967. Normal LFT's. Cardiology was consulted for further evaluation. He reports he fell about a month ago but none recently. Plan for EGD in the am.     Chest x-ray 11/15/2021: INDICATION: Shortness of breath. Heart size is at the upper limits of normal.. There are bilateral pleural  effusions with bilateral diffuse airspace disease or edema.  Multiple right rib  fractures. No pneumothorax. Degenerative changes of the shoulders and spine  IMPRESSION1. Bilateral pleural effusions with bilateral airspace disease or edema. 2. Multiple right-sided rib fractures, probably old, recommend follow-up as warranted. Objective:     Vitals:    11/17/21 0930 11/17/21 0934 11/17/21 0947 11/17/21 1118   BP: 119/67 133/76 139/61 134/71   Pulse: (!) 56 61 (!) 48 63   Resp: 17 15 15 19   Temp:   97.5 °F (36.4 °C) 98.6 °F (37 °C)   SpO2: 93% 93% 95% 98%   Weight:       Height:            Intake and Output:  Current Shift: 11/17 0701 - 11/17 1900  In: 200 [I.V.:200]  Out: -   Last three shifts: 11/15 1901 - 11/17 0700  In: 1132.5 [I.V.:200]  Out: 1300 [Urine:1300]    Physical Exam:   Skin:  Extremities and face reveal no rashes. No gabriel erythema. HEENT: Sclerae anicteric. Extra-occular muscles are intact. No abnormal pigmentation of the lips. The neck is supple. Cardiovascular: Regular rate and rhythm. Respiratory:  Comfortable breathing with no accessory muscle use. GI:  Abdomen nondistended, soft, and nontender. No enlargement of the liver or spleen. No masses palpable. Rectal:  Deferred  Musculoskeletal: Extremities have good range of motion. Neurological:  Gross memory appears intact. Patient is alert and oriented. Psychiatric:  Mood appears appropriate with judgement intact.   Lymphatic:  No visible adenopathy      Lab/Data Review:  Recent Results (from the past 24 hour(s))   METABOLIC PANEL, COMPREHENSIVE    Collection Time: 11/17/21  4:20 AM   Result Value Ref Range    Sodium 136 136 - 145 mmol/L    Potassium 3.8 3.5 - 5.1 mmol/L    Chloride 104 97 - 108 mmol/L    CO2 25 21 - 32 mmol/L    Anion gap 7 5 - 15 mmol/L    Glucose 138 (H) 65 - 100 mg/dL    BUN 30 (H) 6 - 20 mg/dL    Creatinine 1.38 (H) 0.70 - 1.30 mg/dL    BUN/Creatinine ratio 22 (H) 12 - 20      GFR est AA >60 >60 ml/min/1.73m2    GFR est non-AA 50 (L) >60 ml/min/1.73m2    Calcium 8.8 8.5 - 10.1 mg/dL Bilirubin, total 0.5 0.2 - 1.0 mg/dL    AST (SGOT) 17 15 - 37 U/L    ALT (SGPT) 14 12 - 78 U/L    Alk. phosphatase 68 45 - 117 U/L    Protein, total 6.5 6.4 - 8.2 g/dL    Albumin 2.8 (L) 3.5 - 5.0 g/dL    Globulin 3.7 2.0 - 4.0 g/dL    A-G Ratio 0.8 (L) 1.1 - 2.2     CBC WITH AUTOMATED DIFF    Collection Time: 11/17/21  4:20 AM   Result Value Ref Range    WBC 14.7 (H) 4.1 - 11.1 K/uL    RBC 4.02 (L) 4.10 - 5.70 M/uL    HGB 8.2 (L) 12.1 - 17.0 g/dL    HCT 28.0 (L) 36.6 - 50.3 %    MCV 69.7 (L) 80.0 - 99.0 FL    MCH 20.4 (L) 26.0 - 34.0 PG    MCHC 29.3 (L) 30.0 - 36.5 g/dL    RDW 20.1 (H) 11.5 - 14.5 %    PLATELET 846 752 - 458 K/uL    NRBC 0.4 (H) 0.0  WBC    ABSOLUTE NRBC 0.06 (H) 0.00 - 0.01 K/uL    NEUTROPHILS 90 (H) 32 - 75 %    LYMPHOCYTES 4 (L) 12 - 49 %    MONOCYTES 5 5 - 13 %    EOSINOPHILS 0 0 - 7 %    BASOPHILS 0 0 - 1 %    IMMATURE GRANULOCYTES 1 (H) 0 - 0.5 %    ABS. NEUTROPHILS 13.3 (H) 1.8 - 8.0 K/UL    ABS. LYMPHOCYTES 0.7 (L) 0.8 - 3.5 K/UL    ABS. MONOCYTES 0.7 0.0 - 1.0 K/UL    ABS. EOSINOPHILS 0.0 0.0 - 0.4 K/UL    ABS. BASOPHILS 0.0 0.0 - 0.1 K/UL    ABS. IMM. GRANS. 0.1 (H) 0.00 - 0.04 K/UL    DF AUTOMATED                XR CHEST SNGL V   Final Result   1. Bilateral pleural effusions with bilateral airspace disease or edema. 2. Multiple right-sided rib fractures, probably old, recommend follow-up as   warranted. Assessment:     Active Problems:    CHF (congestive heart failure) ([facility] [address] (11/15/2021)      PNA (pneumonia) (11/15/2021)        Plan:   1. GI Bleed       S/P 1 Unit PRBC's on 11/16/21      CBC in the am      Transfuse as needed      S/P EGD on 11/17/21 showing gastritis w/o bleeding      Scheduled for Colonoscopy on Friday 11/19/21      Start prep Thursday 11/18/21 at 1700      Continue PPI      Clear liquid diet      No anticoagulation until seen as out patient     Follow up with GI 2 weeks post discharge.   2. CHFrEF     Elevated pro BNP  7,967      Lasix 40 mg BID Continue beta blocker       Cardiology input appreciated  3. COPD with Exacerbation      Acute Hypoxic Respiratory Failure      Currently on 2 liters of oxygen       Pulmonologist input appreciated      Continue solumedrol   4. GERD      Continue Protonix 40 mg daily    Thank you for allowing me to participate in this patients care   Plan discussed with Dr. Ever Beal and he approves.     Signed By: Gabi Perez NP     November 17, 2021

## 2021-11-17 NOTE — ANESTHESIA PREPROCEDURE EVALUATION
Relevant Problems   RESPIRATORY SYSTEM   (+) PNA (pneumonia)      CARDIOVASCULAR   (+) CHF (congestive heart failure) (HCC)       Anesthetic History   No history of anesthetic complications            Review of Systems / Medical History  Patient summary reviewed, nursing notes reviewed and pertinent labs reviewed    Pulmonary    COPD  Recent URI    Pneumonia, shortness of breath and smoker (2ppd)      Comments: Narrative & Impression  Portable upright radiograph chest 10:02 a.m.. No prior study.     INDICATION: Shortness of breath.     Heart size is at the upper limits of normal.. There are bilateral pleural  effusions with bilateral diffuse airspace disease or edema. Multiple right rib  fractures. No pneumothorax. Degenerative changes of the shoulders and spine.     IMPRESSION  1. Bilateral pleural effusions with bilateral airspace disease or edema. 2. Multiple right-sided rib fractures, probably old, recommend follow-up as  warranted. Neuro/Psych   Within defined limits           Cardiovascular    Hypertension      CHF  Dysrhythmias : atrial fibrillation  Past MI, CAD, PAD and cardiac stents      Comments: 11/15/2021 ECG:    Atrial fibrillation   Nonspecific T wave abnormality   Abnormal ECG   No previous ECGs available   Confirmed by JOSEPH GUERRA, Prabhakar Barry ([id number]) on 11/15/2021 11:18:07 AM    GI/Hepatic/Renal     GERD    Renal disease: CRI       Endo/Other        Anemia (HGB 8.2)     Other Findings   Comments: Copied Hospitalist HPI:      Patricia Levo y. o. male with a past medical history significant for coronary disease s/p PCI, dilated cardiomyopathy, afib, alcohol abuse, current smoker 2ppd presents to the ED with chief complaint of Shortness of Breath and sharp non-exertional non-radiating chest pain.     23-year-old male has a known \"weak heart\".  Does not follow with cardiology. Vera Fajardo he has been having worsening shortness of breath has been progressive over the last 1 to 2 weeks.  Has chronic intermittent cough.  Unsure about any fevers.  Is feeling weaker.  No new swelling.  No nausea vomiting diarrhea.  Some breathing that is been bothering him.  Not related to position or exertion.     Upon work-up was found to be in afib with elevated troponin and elevated BNP. Concern for NSTEMI.     11/16/21:  Today patient is feeling very well. No complaints except for intermittent cough. Denies chest pain, SOB, fevers/chills. Hgb down to 6.8         Physical Exam    Airway  Mallampati: II  TM Distance: 4 - 6 cm  Neck ROM: normal range of motion   Mouth opening: Normal     Cardiovascular    Rhythm: regular  Rate: normal         Dental  No notable dental hx       Pulmonary  Breath sounds clear to auscultation               Abdominal  GI exam deferred       Other Findings   Comments: Results for Anu Pimentel (MRN [id number]) as of 11/17/2021 08:24    11/17/2021 04:20  WBC: 14.7 (H)  NRBC: 0.4 (H)  RBC: 4.02 (L)  HGB: 8.2 (L)  HCT: 28.0 (L)  MCV: 69.7 (L)  MCH: 20.4 (L)  MCHC: 29.3 (L)  RDW: 20.1 (H)  PLATELET: 944  NEUTROPHILS: 90 (H)  LYMPHOCYTES: 4 (L)  MONOCYTES: 5  EOSINOPHILS: 0  BASOPHILS: 0  IMMATURE GRANULOCYTES: 1 (H)  DF: AUTOMATED  ABSOLUTE NRBC: 0.06 (H)  ABS. NEUTROPHILS: 13.3 (H)  ABS. IMM. GRANS.: 0.1 (H)  ABS. LYMPHOCYTES: 0.7 (L)  ABS. MONOCYTES: 0.7  ABS. EOSINOPHILS: 0.0  ABS. BASOPHILS: 0.0    Results for Anu Pimentel (MRN [id number]) as of 11/17/2021 08:24    11/16/2021 04:23  Sodium: 138  Potassium: 4.1  Chloride: 104  CO2: 28  Anion gap: 6  Glucose: 143 (H)  BUN: 19  Creatinine: 1.19  BUN/Creatinine ratio: 16  Calcium: 8.6  GFR est non-AA: 59 (L)  GFR est AA: >60  Bilirubin, total: 0.5  Protein, total: 6.3 (L)  Albumin: 3.2 (L)  Globulin: 3.1  A-G Ratio: 1.0 (L)  ALT: 14  AST: 12 (L)  Alk.  phosphatase: 75    Results for Anu Roles (MRN [id number]) as of 11/17/2021 08:24    11/16/2021 04:23  NT pro-BNP: 7,967 (H)    Results for Anu Roles (MRN [id number]) as of 11/17/2021 08:24    11/15/2021 09:35  Troponin-High Sensitivity: 147 (HH)             Anesthetic Plan    ASA: 4  Anesthesia type: total IV anesthesia and MAC          Induction: Intravenous  Anesthetic plan and risks discussed with: Patient

## 2021-11-17 NOTE — ANESTHESIA POSTPROCEDURE EVALUATION
Procedure(s):  ESOPHAGOGASTRODUODENOSCOPY (EGD).     total IV anesthesia, MAC    Anesthesia Post Evaluation      Multimodal analgesia: multimodal analgesia not used between 6 hours prior to anesthesia start to PACU discharge  Patient location during evaluation: bedside  Patient participation: complete - patient participated  Level of consciousness: awake and alert  Pain score: 0  Pain management: adequate  Airway patency: patent  Anesthetic complications: no  Cardiovascular status: acceptable, blood pressure returned to baseline and hemodynamically stable  Respiratory status: acceptable, nonlabored ventilation, spontaneous ventilation, room air and unassisted  Hydration status: acceptable  Post anesthesia nausea and vomiting:  none  Final Post Anesthesia Temperature Assessment:  Normothermia (36.0-37.5 degrees C)      INITIAL Post-op Vital signs:   Vitals Value Taken Time   /69 11/17/21 0925   Temp     Pulse 62 11/17/21 0925   Resp 18 11/17/21 0925   SpO2 97 % 11/17/21 0925

## 2021-11-17 NOTE — PROGRESS NOTES
Problem: Afib Pathway: Day 2  Goal: Activity/Safety  Outcome: Progressing Towards Goal  Note: Bed is in the lowest position and wheels are locked, call bell is within reach, bathroom light is on during evening hours, gripper socks are on and patient has been instructed to call out for assistance if needed. As of now, patient is free from falls and will continue to be monitored. Goal: Diagnostic Test/Procedures  Outcome: Progressing Towards Goal  Note: Patient is scheduled for an EDG in the AM  Goal: Nutrition/Diet  Outcome: Progressing Towards Goal  Note: Patient is tolerating his diet and reports no nausea       Primary Nurse Sydney Rivera and DEE DEE Gonzalez performed a dual skin assessment on this patient No impairment noted-Patient does have a scar on the L upper thigh  Wes score is 19        Bedside shift change report given to DEE DEE Rutledge (oncoming nurse) by Rani Salazar RN (offgoing nurse). Report included the following information SBAR, Kardex, Procedure Summary, Intake/Output, MAR, Accordion and Cardiac Rhythm A-Fib.

## 2021-11-17 NOTE — PROGRESS NOTES
*ATTENTION:  This note has been created by a medical student for educational purposes only. Please do not refer to the content of this note for clinical decision-making, billing, or other purposes. Please see attending physicians note to obtain clinical information on this patient. *     General Daily Progress Note          Patient Name:   Merrill Nguyen       YOB: 1940       Age:  [de-identified] y.o. Admit Date: 11/15/2021      Subjective:     Delmon Kin y. o. male with a past medical history significant for coronary disease s/p PCI, dilated cardiomyopathy, afib, alcohol abuse, current smoker 2ppd. On 11/15/2021, presented to the ED with chief complaint of Shortness of Breath and sharp non-exertional non-radiating chest pain. On ED work-up was found to be in afib with elevated troponin and elevated BNP. Concern for NSTEMI. Admitted for further evaluation and treatment.       11/17:  Last night, tele called reporting patient has dropped down to 48 a few times yesterday and comes right back up to baseline rate. Patient asymptomatic. Resting in bed, no complaints. Last night range was 56-89. Patient seen and examined. S/p EGD this morning. Awaiting results. He is sitting up in bed, eating his breakfast. On room air. Presently has no complaints. Denies headache, dizziness, chest pain, shortness of breath, abdominal pain, nausea, vomiting, changes in bowel/bladder, fevers, or chills. Scheduled for colonoscopy tomorrow.     Objective:     Visit Vitals  /61   Pulse (!) 48   Temp 97.5 °F (36.4 °C)   Resp 15   Ht 5' 11.5\" (1.816 m)   Wt 169 lb 8.5 oz (76.9 kg)   SpO2 95%   BMI 23.32 kg/m²        Recent Results (from the past 24 hour(s))   TYPE & SCREEN    Collection Time: 11/16/21 11:40 AM   Result Value Ref Range    Crossmatch Expiration 11/19/2021,4089     ABO/Rh(D) A Positive     Antibody screen Negative     Unit number [id number]     Blood component type RC LR     Unit division 00 Status of unit [address] to transfuse     Crossmatch result Compatible    METABOLIC PANEL, COMPREHENSIVE    Collection Time: 11/17/21  4:20 AM   Result Value Ref Range    Sodium 136 136 - 145 mmol/L    Potassium 3.8 3.5 - 5.1 mmol/L    Chloride 104 97 - 108 mmol/L    CO2 25 21 - 32 mmol/L    Anion gap 7 5 - 15 mmol/L    Glucose 138 (H) 65 - 100 mg/dL    BUN 30 (H) 6 - 20 mg/dL    Creatinine 1.38 (H) 0.70 - 1.30 mg/dL    BUN/Creatinine ratio 22 (H) 12 - 20      GFR est AA >60 >60 ml/min/1.73m2    GFR est non-AA 50 (L) >60 ml/min/1.73m2    Calcium 8.8 8.5 - 10.1 mg/dL    Bilirubin, total 0.5 0.2 - 1.0 mg/dL    AST (SGOT) 17 15 - 37 U/L    ALT (SGPT) 14 12 - 78 U/L    Alk. phosphatase 68 45 - 117 U/L    Protein, total 6.5 6.4 - 8.2 g/dL    Albumin 2.8 (L) 3.5 - 5.0 g/dL    Globulin 3.7 2.0 - 4.0 g/dL    A-G Ratio 0.8 (L) 1.1 - 2.2     CBC WITH AUTOMATED DIFF    Collection Time: 11/17/21  4:20 AM   Result Value Ref Range    WBC 14.7 (H) 4.1 - 11.1 K/uL    RBC 4.02 (L) 4.10 - 5.70 M/uL    HGB 8.2 (L) 12.1 - 17.0 g/dL    HCT 28.0 (L) 36.6 - 50.3 %    MCV 69.7 (L) 80.0 - 99.0 FL    MCH 20.4 (L) 26.0 - 34.0 PG    MCHC 29.3 (L) 30.0 - 36.5 g/dL    RDW 20.1 (H) 11.5 - 14.5 %    PLATELET 954 369 - 532 K/uL    NRBC 0.4 (H) 0.0  WBC    ABSOLUTE NRBC 0.06 (H) 0.00 - 0.01 K/uL    NEUTROPHILS 90 (H) 32 - 75 %    LYMPHOCYTES 4 (L) 12 - 49 %    MONOCYTES 5 5 - 13 %    EOSINOPHILS 0 0 - 7 %    BASOPHILS 0 0 - 1 %    IMMATURE GRANULOCYTES 1 (H) 0 - 0.5 %    ABS. NEUTROPHILS 13.3 (H) 1.8 - 8.0 K/UL    ABS. LYMPHOCYTES 0.7 (L) 0.8 - 3.5 K/UL    ABS. MONOCYTES 0.7 0.0 - 1.0 K/UL    ABS. EOSINOPHILS 0.0 0.0 - 0.4 K/UL    ABS. BASOPHILS 0.0 0.0 - 0.1 K/UL    ABS. IMM. GRANS. 0.1 (H) 0.00 - 0.04 K/UL    DF AUTOMATED             Review of Systems    Constitutional: Negative for chills and fever. HENT: Negative. Eyes: Negative. Respiratory: Negative. Cardiovascular: Negative.     Gastrointestinal: Negative for abdominal pain and nausea. Skin: Negative. Neurological: Negative. Physical Exam:      Constitutional: Elderly male, sitting up in bed, in no acute distress. HENT:   Head: Normocephalic and atraumatic. Eyes: Pupils are equal, round, and reactive to light. EOM are normal.   Cardiovascular: Normal rate, regular rhythm and normal heart sounds. Pulmonary/Chest: Breath sounds normal. No wheezes. No rales. Exhibits no tenderness. Abdominal: Soft. Bowel sounds are normal. There is no abdominal tenderness. There is no rebound and no guarding. Musculoskeletal: Normal range of motion. Neurological: pt is alert and oriented to person, place, and time. XR CHEST SNGL V   Final Result   1. Bilateral pleural effusions with bilateral airspace disease or edema. 2. Multiple right-sided rib fractures, probably old, recommend follow-up as   warranted. Recent Results (from the past 24 hour(s))   TYPE & SCREEN    Collection Time: 11/16/21 11:40 AM   Result Value Ref Range    Crossmatch Expiration 11/19/2021,2359     ABO/Rh(D) A Positive     Antibody screen Negative     Unit number [id number]     Blood component type RC LR     Unit division 00     Status of unit [address] 130 to transfuse     Crossmatch result Compatible    METABOLIC PANEL, COMPREHENSIVE    Collection Time: 11/17/21  4:20 AM   Result Value Ref Range    Sodium 136 136 - 145 mmol/L    Potassium 3.8 3.5 - 5.1 mmol/L    Chloride 104 97 - 108 mmol/L    CO2 25 21 - 32 mmol/L    Anion gap 7 5 - 15 mmol/L    Glucose 138 (H) 65 - 100 mg/dL    BUN 30 (H) 6 - 20 mg/dL    Creatinine 1.38 (H) 0.70 - 1.30 mg/dL    BUN/Creatinine ratio 22 (H) 12 - 20      GFR est AA >60 >60 ml/min/1.73m2    GFR est non-AA 50 (L) >60 ml/min/1.73m2    Calcium 8.8 8.5 - 10.1 mg/dL    Bilirubin, total 0.5 0.2 - 1.0 mg/dL    AST (SGOT) 17 15 - 37 U/L    ALT (SGPT) 14 12 - 78 U/L    Alk.  phosphatase 68 45 - 117 U/L    Protein, total 6.5 6.4 - 8.2 g/dL    Albumin 2.8 (L) 3.5 - 5.0 g/dL    Globulin 3.7 2.0 - 4.0 g/dL    A-G Ratio 0.8 (L) 1.1 - 2.2     CBC WITH AUTOMATED DIFF    Collection Time: 11/17/21  4:20 AM   Result Value Ref Range    WBC 14.7 (H) 4.1 - 11.1 K/uL    RBC 4.02 (L) 4.10 - 5.70 M/uL    HGB 8.2 (L) 12.1 - 17.0 g/dL    HCT 28.0 (L) 36.6 - 50.3 %    MCV 69.7 (L) 80.0 - 99.0 FL    MCH 20.4 (L) 26.0 - 34.0 PG    MCHC 29.3 (L) 30.0 - 36.5 g/dL    RDW 20.1 (H) 11.5 - 14.5 %    PLATELET 756 825 - 259 K/uL    NRBC 0.4 (H) 0.0  WBC    ABSOLUTE NRBC 0.06 (H) 0.00 - 0.01 K/uL    NEUTROPHILS 90 (H) 32 - 75 %    LYMPHOCYTES 4 (L) 12 - 49 %    MONOCYTES 5 5 - 13 %    EOSINOPHILS 0 0 - 7 %    BASOPHILS 0 0 - 1 %    IMMATURE GRANULOCYTES 1 (H) 0 - 0.5 %    ABS. NEUTROPHILS 13.3 (H) 1.8 - 8.0 K/UL    ABS. LYMPHOCYTES 0.7 (L) 0.8 - 3.5 K/UL    ABS. MONOCYTES 0.7 0.0 - 1.0 K/UL    ABS. EOSINOPHILS 0.0 0.0 - 0.4 K/UL    ABS. BASOPHILS 0.0 0.0 - 0.1 K/UL    ABS. IMM. GRANS. 0.1 (H) 0.00 - 0.04 K/UL    DF AUTOMATED         Results     Procedure Component Value Units Date/Time    COVID-19 RAPID TEST [[id number]] Collected: 11/15/21 2100    Order Status: Completed Specimen: Nasopharyngeal Updated: 11/15/21 2148     Specimen source       Please find results under separate order           COVID-19 rapid test Not Detected        Comment: Rapid Abbott ID Now   Rapid NAAT:  The specimen is NEGATIVE for SARS-CoV-2, the novel coronavirus associated with COVID-19. Negative results should be treated as presumptive and, if inconsistent with clinical signs and symptoms or necessary for patient management, should be tested with an alternative molecular assay. Negative results do not preclude SARS-CoV-2 infection and should not be used as the sole basis for patient management decisions. This test has been authorized by the FDA under   an Emergency Use Authorization (EUA) for use by authorized laboratories.  Fact sheet for Healthcare Providers: ConventionUpdate.co.nz Fact sheet for Patients: ConventionUpdate.co.nz   Methodology: Isothermal Nucleic Acid Amplification         CULTURE, BLOOD #1 [[id number]]     Order Status: Sent Specimen: Blood     CULTURE, BLOOD #2 [[id number]]     Order Status: Sent Specimen: Blood     CULTURE, BLOOD, PAIRED [[id number]] Collected: 11/15/21 1200    Order Status: Completed Specimen: Blood Updated: 11/17/21 0951     Special Requests: No Special Requests        Culture result: No growth 2 days       CULTURE, BLOOD [[id number]]     Order Status: Sent Specimen: Blood            Labs:     Recent Labs     11/17/21 0420 11/16/21 0423   WBC 14.7* 11.4*   HGB 8.2* 6.8*   HCT 28.0* 24.0*    390     Recent Labs     11/17/21  0420 11/16/21  0423 11/15/21  2147    138 139   K 3.8 4.1 3.5    104 105   CO2 25 28 28   BUN 30* 19 13   CREA 1.38* 1.19 1.17   * 143* 202*   CA 8.8 8.6 8.6     Recent Labs     11/17/21  0420 11/16/21  0423 11/15/21  0935   ALT 14 14 14   AP 68 75 85   TBILI 0.5 0.5 0.5   TP 6.5 6.3* 6.8   ALB 2.8* 3.2* 3.4*   GLOB 3.7 3.1 3.4     No results for input(s): INR, PTP, APTT, INREXT, INREXT in the last 72 hours. No results for input(s): FE, TIBC, PSAT, FERR in the last 72 hours. No results found for: FOL, RBCF   No results for input(s): PH, PCO2, PO2 in the last 72 hours. No results for input(s): CPK, CKNDX, TROIQ in the last 72 hours.     No lab exists for component: CPKMB  No results found for: CHOL, CHOLX, CHLST, CHOLV, HDL, HDLP, LDL, LDLC, DLDLP, TGLX, TRIGL, TRIGP, CHHD, CHHDX  No results found for: [facility]  [facility]  Lab Results   Component Value Date/Time    Color Yellow/Straw 11/15/2021 09:35 AM    Appearance Clear 11/15/2021 09:35 AM    Specific gravity 1.016 11/15/2021 09:35 AM    pH (UA) 5.0 11/15/2021 09:35 AM    Protein 30 (A) 11/15/2021 09:35 AM    Glucose Negative 11/15/2021 09:35 AM    Ketone Negative 11/15/2021 09:35 AM    Bilirubin Negative 11/15/2021 09:35 AM    Urobilinogen 2.0 (H) 11/15/2021 09:35 AM    Nitrites Negative 11/15/2021 09:35 AM    Leukocyte Esterase Negative 11/15/2021 09:35 AM    Bacteria Negative 11/15/2021 09:35 AM    WBC 0-4 11/15/2021 09:35 AM    RBC 0-5 11/15/2021 09:35 AM         Assessment:     Anemia d/t GI bleed  · 11/16: S/p transfusion 1 unit PRBC  · S/p EGD today. Awaiting results  · Scheduled for colonoscopy tomorrow. Congestive heart failure with reduced EF  · CXR:  Bilateral pleural effusions with bilateral airspace disease or edema. Multiple right-sided rib fractures, probably old, recommend follow-up as warranted.     Atrial Fibrillation with RVR  · On metoprolol for rate control and aspirin 81mg daily. Bradycardia     Pneumonia     Acute hypoxic respiratory failure    COPD with severe acute exacerbation, poor airway clearance    GERD    HTN     Hyperlipidemia     Alcohol Abuse     Nicotine dependence     Frequent falls     Plan:     Medications  · Aspirin 81 mg PO daily  · Symbicort BID  · Famotidine 20 mg PO BID  · Lasix 40 mg IV BID  · Solu-Medrol IV 40 mg Q8H  · Metoprolol succinate 25 mg PO daily  · Pantoprazole 40 mg PO daily  · Zosyn IV Q8H  · Trazodone 25 mg PO daily  · Normal saline infusion      Labs/Studies  · Colonoscopy tomorrow. · Awaiting echocardiogram      Consults:  Cardiology: Continue IV Lasix BID. Strict I/O monitoring. Glucagon 1 mg IV push for HR less than 40. GI: Continue medications. Cardiology and pulmonary input. PT: Will reattempt. Patient was RUTHIE for EGD. Pulmonary: oxygen via NC. Patient declining to use oxygen and inhalers at home. Patient declined smoking cessation.       Discharge: to [facility]        Current Facility-Administered Medications:     0.9% sodium chloride infusion, 75 mL/hr, IntraVENous, CONTINUOUS, Noam GREGORY, NP, Last Rate: 75 mL/hr at 11/17/21 1000, 75 mL/hr at 11/17/21 1000    sodium chloride (NS) flush 5-40 mL, 5-40 mL, IntraVENous, Q8H, Dayna GREGORY NP, 10 mL at 11/17/21 1001    sodium chloride (NS) flush 5-40 mL, 5-40 mL, IntraVENous, PRN, Davon Dyson, NP    ePHEDrine sulfate (AKOVAZ) 50 mg/mL injection, , , ,     peg 3350-Electrolytes-Vit C (MOVIPREP) oral solution 1 L, 1 L, Oral, ONCE, Faraz Ovalles MD    budesonide-formoteroL [facility]) 160-4.5 mcg/actuation HFA inhaler 2 Puff, 2 Puff, Inhalation, BID RT, Wendy Apodaca MD, 2 Puff at 11/16/21 2003    0.9% sodium chloride infusion 250 mL, 250 mL, IntraVENous, PRN, Jose Santana MD    methylPREDNISolone (PF) (SOLU-MEDROL) injection 40 mg, 40 mg, IntraVENous, Q8H, Jose Santana MD, 40 mg at 11/17/21 0517    pantoprazole (PROTONIX) tablet 40 mg, 40 mg, Oral, DAILY, Wendy Apodaca MD, 40 mg at 11/17/21 0959    0.9% sodium chloride infusion 250 mL, 250 mL, IntraVENous, PRN, Neftali Apodaca MD    famotidine (PEPCID) tablet 20 mg, 20 mg, Oral, BID, Wendy Apodaca MD, 20 mg at 11/17/21 0959    traMADoL (ULTRAM) tablet 50 mg, 50 mg, Oral, Q6H PRN, Wendy Apodaca MD, 50 mg at 11/16/21 2214    glucagon (GLUCAGEN) injection 1 mg, 1 mg, IntraVENous, PRN, Brent Walton MD    acetaminophen (TYLENOL) tablet 650 mg, 650 mg, Oral, Q6H PRN **OR** acetaminophen (TYLENOL) suppository 650 mg, 650 mg, Rectal, Q6H PRN, Neftali Apodaca MD    polyethylene glycol (MIRALAX) packet 17 g, 17 g, Oral, DAILY PRN, Neftali Apodaca MD    ondansetron (ZOFRAN ODT) tablet 4 mg, 4 mg, Oral, Q8H PRN **OR** ondansetron (ZOFRAN) injection 4 mg, 4 mg, IntraVENous, Q6H PRN, Wendy Apodaca MD    piperacillin-tazobactam (ZOSYN) 3.375 g in 0.9% sodium chloride (MBP/ADV) 100 mL MBP, 3.375 g, IntraVENous, Q8H, Wendy Apodaca MD, Last Rate: 200 mL/hr at 11/17/21 0517, 3.375 g at 11/17/21 0517    furosemide (LASIX) injection 40 mg, 40 mg, IntraVENous, BID, Wendy Apodaca MD, 40 mg at 11/16/21 8748    [Held by provider] aspirin chewable tablet 81 mg, 81 mg, Oral, DAILY, Saniya Tamayo MD, 81 mg at 11/16/21 0810    metoprolol succinate (TOPROL-XL) XL tablet 25 mg, 25 mg, Oral, DAILY, Saniya Tamayo MD, 25 mg at 11/16/21 0811    albuterol (PROVENTIL HFA, VENTOLIN HFA, PROAIR HFA) inhaler 2 Puff, 2 Puff, Inhalation, Q4H PRN, Jose Santana MD    influenza vaccine 2021-22 (6 mos+)(PF) (FLUARIX/FLULAVAL/FLUZONE QUAD) injection 0.5 mL, 1 Each, IntraMUSCular, PRIOR TO DISCHARGE, Wendy Apodaca MD    traZODone (DESYREL) tablet 25 mg, 25 mg, Oral, QHS, Saniya Tamayo MD, 25 mg at 11/16/21 1923

## 2021-11-17 NOTE — PROGRESS NOTES
PULMONARY NOTE  [facility]    Name: Marnie Sibley MRN: [id number]   : 1940 Hospital: [address] [place] [address]   Date: 2021  Admission date: 11/15/2021 Hospital Day: 3       HPI:     Hospital Problems  Date Reviewed: 2021          Codes Class Noted POA    CHF (congestive heart failure) ([facility] [address] ICD-10-CM: I50.9  ICD-9-CM: 428.0  11/15/2021 Unknown        PNA (pneumonia) ICD-10-CM: J18.9  ICD-9-CM: 486  11/15/2021 Unknown                   [x] High complexity decision making was performed  [x] See my orders for details      Subjective/Initial History:     I was asked by Garret Milner MD to see Marnie Sibley  a [de-identified] y.o.    male in consultation     Excerpts from admission 11/15/2021 or consult notes as follows:   78-year-old male came in because of shortness of breath and dyspnea was found to be in atrial fibrillation, he smokes 2 packs/day has been smoking since the age of 7 denies any history of chest pain but complaining about shortness of breath and dyspnea for the past 1 to 2 weeks x-ray was done which shows bilateral pleural effusion and some infiltrate and history of multiple right-sided rib fracture probably old so admitted and pulmonary consult was called for further evaluation      No Known Allergies     MAR reviewed and pertinent medications noted or modified as needed     Current Facility-Administered Medications   Medication    0.9% sodium chloride infusion    sodium chloride (NS) flush 5-40 mL    sodium chloride (NS) flush 5-40 mL    ePHEDrine sulfate (AKOVAZ) 50 mg/mL injection    peg 3350-Electrolytes-Vit C (MOVIPREP) oral solution 1 L    budesonide-formoteroL (SYMBICORT) 160-4.5 mcg/actuation HFA inhaler 2 Puff    0.9% sodium chloride infusion 250 mL    methylPREDNISolone (PF) (SOLU-MEDROL) injection 40 mg    pantoprazole (PROTONIX) tablet 40 mg    0.9% sodium chloride infusion 250 mL    famotidine (PEPCID) tablet 20 mg    traMADoL (ULTRAM) tablet 50 mg    glucagon (GLUCAGEN) injection 1 mg    acetaminophen (TYLENOL) tablet 650 mg    Or    acetaminophen (TYLENOL) suppository 650 mg    polyethylene glycol (MIRALAX) packet 17 g    ondansetron (ZOFRAN ODT) tablet 4 mg    Or    ondansetron (ZOFRAN) injection 4 mg    piperacillin-tazobactam (ZOSYN) 3.375 g in 0.9% sodium chloride (MBP/ADV) 100 mL MBP    furosemide (LASIX) injection 40 mg    [Held by provider] aspirin chewable tablet 81 mg    metoprolol succinate (TOPROL-XL) XL tablet 25 mg    albuterol (PROVENTIL HFA, VENTOLIN HFA, PROAIR HFA) inhaler 2 Puff    influenza vaccine  (6 mos+)(PF) (FLUARIX/FLULAVAL/FLUZONE QUAD) injection 0.5 mL    traZODone (DESYREL) tablet 25 mg      Patient PCP: Quita Higginbotham MD  PMH:  has a past medical history of GERD (gastroesophageal reflux disease) and Hypertension. PSH:   has no past surgical history on file. FHX: family history is not on file. SHX:  reports that he has been smoking. He has been smoking about 2.00 packs per day. He has never used smokeless tobacco.     ROS:    Review of Systems   Constitutional: Positive for malaise/fatigue. HENT: Negative. Eyes: Negative. Respiratory: Positive for cough, sputum production and wheezing. Cardiovascular: Negative. Gastrointestinal: Negative. Genitourinary: Negative. Musculoskeletal: Negative. Skin: Negative. Neurological: Negative. Psychiatric/Behavioral: Negative.          Objective:     Vital Signs: Telemetry:    AFIB Intake/Output:   Visit Vitals  /76   Pulse 61   Temp 97.5 °F (36.4 °C)   Resp 15   Ht 5' 11.5\" (1.816 m)   Wt 76.9 kg (169 lb 8.5 oz)   SpO2 93%   BMI 23.32 kg/m²       Temp (24hrs), Av.1 °F (36.7 °C), Min:97.5 °F (36.4 °C), Max:98.6 °F (37 °C)        O2 Device: None (Room air) O2 Flow Rate (L/min): 2 l/min       Wt Readings from Last 4 Encounters:   21 76.9 kg (169 lb 8.5 oz)          Intake/Output Summary (Last 24 hours) at 2021 120 Antoine Cobian filed at 11/17/2021 0909  Gross per 24 hour   Intake 1332.5 ml   Output 1300 ml   Net 32.5 ml       Last shift:      11/17 0701 - 11/17 1900  In: 200 [I.V.:200]  Out: -   Last 3 shifts: 11/15 1901 - 11/17 0700  In: 1132.5 [I.V.:200]  Out: 1300 [Urine:1300]       Physical Exam:     Physical Exam  Constitutional:       Appearance: Normal appearance. HENT:      Head: Normocephalic and atraumatic. Nose: Nose normal.      Mouth/Throat:      Mouth: Mucous membranes are moist.   Eyes:      Pupils: Pupils are equal, round, and reactive to light. Cardiovascular:      Rate and Rhythm: Normal rate. Rhythm irregular. Pulses: Normal pulses. Heart sounds: Normal heart sounds. Pulmonary:      Effort: Pulmonary effort is normal.      Breath sounds: Wheezing present. Abdominal:      General: Abdomen is flat. Bowel sounds are normal.   Musculoskeletal:         General: Normal range of motion. Cervical back: Normal range of motion and neck supple. Skin:     General: Skin is warm. Neurological:      Mental Status: He is alert. Labs:    Recent Labs     11/17/21  0420 11/16/21  0423 11/15/21  0935   WBC 14.7* 11.4* 13.6*   HGB 8.2* 6.8* 7.1*    390 405*     Recent Labs     11/16/21  0423 11/15/21  2147 11/15/21  1200 11/15/21  0935    139  --  144   K 4.1 3.5  --  4.2    105  --  113*   CO2 28 28  --  25   * 202*  --  128*   BUN 19 13  --  15   CREA 1.19 1.17  --  1.05   CA 8.6 8.6  --  8.7   LAC  --   --  1.4  --    ALB 3.2*  --   --  3.4*   ALT 14  --   --  14     No results for input(s): PH, PCO2, PO2, HCO3, FIO2 in the last 72 hours. No results for input(s): CPK, CKNDX, TROIQ in the last 72 hours.     No lab exists for component: CPKMB  No results found for: BNPP, BNP   Lab Results   Component Value Date/Time    Culture result: No growth after 20 hours 11/15/2021 12:00 PM   No results found for: TSH, TSHEXT, TSHEXT    Imaging:    CXR Results  (Last 48 hours)               11/15/21 1006  XR CHEST SNGL V Final result    Impression:  1. Bilateral pleural effusions with bilateral airspace disease or edema. 2. Multiple right-sided rib fractures, probably old, recommend follow-up as   warranted. Narrative:  Portable upright radiograph chest 10:02 a.m.. No prior study. INDICATION: Shortness of breath. Heart size is at the upper limits of normal.. There are bilateral pleural   effusions with bilateral diffuse airspace disease or edema. Multiple right rib   fractures. No pneumothorax. Degenerative changes of the shoulders and spine. Results from [place] encounter on 11/15/21    XR CHEST SNGL V    Narrative  Portable upright radiograph chest 10:02 a.m.. No prior study. INDICATION: Shortness of breath. Heart size is at the upper limits of normal.. There are bilateral pleural  effusions with bilateral diffuse airspace disease or edema. Multiple right rib  fractures. No pneumothorax. Degenerative changes of the shoulders and spine. Impression  1. Bilateral pleural effusions with bilateral airspace disease or edema. 2. Multiple right-sided rib fractures, probably old, recommend follow-up as  warranted. No results found for this or any previous visit. IMPRESSION:   1. Acute hypoxic respiratory failure  2. Chronic Obstructive Pulmonary Disease with Severe Acute Exacerbation requiring inpatient hospitalization and management; has very poor airway clearance. Increased work of breathing  3. Atrial fibrillation with RVR  4. Congestive heart failure rule out cor pulmonale  5. Tobacco abuse  6. Anemia      RECOMMENDATIONS/PLAN:     1. Will start patient on oxygen via nasal cannula and patient is declining to use oxygen at home started him on inhalers but he does not want to use inhalers when discharged from here  2. Now is in A. fib with RVR cardiology on case  3.  Chest x-ray shows most likely congestive heart failure  4. Anemia hemoglobin 6.8 transfuse packed RBC   5. For EGD today  6. Smoking cessation counseling done offered nicotine patch but patient declined  7. Pt needs IV fluids with additives and Drug therapy requiring intensive monitoring for toxicity  8.  Prescription drug management with home med reconciliation reviewed       ·           Hetal Harrison MD

## 2021-11-17 NOTE — PROGRESS NOTES
Progress Note    Patient: Woo Rodriguez MRN: [id number]  SSN: [id number]    YOB: 1940  Age: [de-identified] y.o. Sex: male      Admit Date: 11/15/2021    LOS: 2 days     Subjective:     No acute events overnight    Objective:     Vitals:    11/17/21 0930 11/17/21 0934 11/17/21 0947 11/17/21 1118   BP: 119/67 133/76 139/61 134/71   Pulse: (!) 56 61 (!) 48 63   Resp: 17 15 15 19   Temp:   97.5 °F (36.4 °C) 98.6 °F (37 °C)   SpO2: 93% 93% 95% 98%   Weight:       Height:            Intake and Output:  Current Shift: 11/17 0701 - 11/17 1900  In: 200 [I.V.:200]  Out: -   Last three shifts: 11/15 1901 - 11/17 0700  In: 1132.5 [I.V.:200]  Out: 1300 [Urine:1300]    Physical Exam:   General:  Alert, cooperative, no distress, appears stated age. Eyes:  Conjunctivae/corneas clear. PERRL, EOMs intact. Fundi benign   Ears:  Normal TMs and external ear canals both ears. Nose: Nares normal. Septum midline. Mucosa normal. No drainage or sinus tenderness. Mouth/Throat: Lips, mucosa, and tongue normal. Teeth and gums normal.   Neck: Supple, symmetrical, trachea midline, no adenopathy, thyroid: no enlargment/tenderness/nodules, no carotid bruit and no JVD. Back:   Symmetric, no curvature. ROM normal. No CVA tenderness. Lungs:   Clear to auscultation bilaterally. Heart:  Regular rate and rhythm, S1, S2 normal, no murmur, click, rub or gallop. Abdomen:   Soft, non-tender. Bowel sounds normal. No masses,  No organomegaly. Extremities: Extremities normal, atraumatic, no cyanosis or edema. Pulses: 2+ and symmetric all extremities. Skin: Skin color, texture, turgor normal. No rashes or lesions   Lymph nodes: Cervical, supraclavicular, and axillary nodes normal.   Neurologic: CNII-XII intact. Normal strength, sensation and reflexes throughout. Lab/Data Review: All lab results for the last 24 hours reviewed.          Assessment:     Active Problems:    CH[facility]congestive heart failure) ([facility] (11/15/2021) PNA (pneumonia) (11/15/2021)       Patient is an 20-year-old white male with:  1. Atrial fibrillation with rapid ventricular rate  2. Alcohol abuse  3. Nicotine dependence  4. Coronary artery status post PCI  5. Hyperlipidemia  6. Frequent falls  7. Heart failure with reduced ejection fraction  8. Anemia    Plan:      Patient complains of abdominal pain. Hemoglobin is up from 6.8-8.2. Platelet is 907. He was seen by GI. Creatinine is 1.38 and BUN at 30. He is laying flat and appears to be comfortable. Currently on oral Lasix. Echocardiogram pending. His aspirin is on hold. Noted for possible endoscopy.   Hold beta-blocker    Signed By: Velasquez Joyce MD     November 17, 2021

## 2021-11-18 ENCOUNTER — APPOINTMENT (OUTPATIENT)
Dept: NON INVASIVE DIAGNOSTICS | Age: 81
DRG: 280 | End: 2021-11-18
Attending: INTERNAL MEDICINE
Payer: MEDICARE

## 2021-11-18 ENCOUNTER — ANESTHESIA (OUTPATIENT)
Dept: ANESTHESIOLOGY | Age: 81
DRG: 280 | End: 2021-11-18
Payer: MEDICARE

## 2021-11-18 LAB
ALBUMIN SERPL-MCNC: 2.9 G/DL (ref 3.5–5)
ALBUMIN/GLOB SERPL: 1 {RATIO} (ref 1.1–2.2)
ALP SERPL-CCNC: 62 U/L (ref 45–117)
ALT SERPL-CCNC: 28 U/L (ref 12–78)
ANION GAP SERPL CALC-SCNC: 7 MMOL/L (ref 5–15)
AST SERPL W P-5'-P-CCNC: 23 U/L (ref 15–37)
BACTERIA SPEC CULT: NORMAL
BACTERIA SPEC CULT: NORMAL
BILIRUB SERPL-MCNC: 0.6 MG/DL (ref 0.2–1)
BNP SERPL-MCNC: 5079 PG/ML
BUN SERPL-MCNC: 23 MG/DL (ref 6–20)
BUN/CREAT SERPL: 23 (ref 12–20)
CA-I BLD-MCNC: 8.3 MG/DL (ref 8.5–10.1)
CHLORIDE SERPL-SCNC: 106 MMOL/L (ref 97–108)
CO2 SERPL-SCNC: 27 MMOL/L (ref 21–32)
CREAT SERPL-MCNC: 1 MG/DL (ref 0.7–1.3)
ECHO AO ROOT DIAM: 3.6 CM
ECHO AR MAX VEL PISA: 305 CM/S
ECHO AV PEAK GRADIENT: 7 MMHG
ECHO AV PEAK VELOCITY: 133 CM/S
ECHO AV REGURGITANT PHT: 540 MS
ECHO EST RA PRESSURE: 15 MMHG
ECHO LA AREA 4C: 22.26 CM2
ECHO LA MAJOR AXIS: 5.6 CM
ECHO LA MINOR AXIS: 2.79 CM
ECHO LV E' SEPTAL VELOCITY: 6.73 CM/S
ECHO LV EDV A2C: 76.2 CM3
ECHO LV EJECTION FRACTION BIPLANE: 60.3 % (ref 55–100)
ECHO LV ESV A2C: 24.1 CM3
ECHO LV INTERNAL DIMENSION DIASTOLIC: 4.24 CM (ref 4.2–5.9)
ECHO LV INTERNAL DIMENSION SYSTOLIC: 2.89 CM
ECHO LV IVSD: 1.02 CM (ref 0.6–1)
ECHO LV MASS 2D: 152.2 G (ref 88–224)
ECHO LV MASS INDEX 2D: 75.7 G/M2 (ref 49–115)
ECHO LV POSTERIOR WALL DIASTOLIC: 1.11 CM (ref 0.6–1)
ECHO LVOT PEAK GRADIENT: 3 MMHG
ECHO LVOT PEAK VELOCITY: 84.9 CM/S
ECHO MV A VELOCITY: 107 CM/S
ECHO MV AREA PHT: 3.1 CM2
ECHO MV E DECELERATION TIME (DT): 225 MS
ECHO MV E VELOCITY: 138 CM/S
ECHO MV E/A RATIO: 1.29
ECHO MV E/E' SEPTAL: 20.51
ECHO MV MAX VELOCITY: 124 CM/S
ECHO MV MEAN GRADIENT: 2 MMHG
ECHO MV PEAK GRADIENT: 6 MMHG
ECHO MV PRESSURE HALF TIME (PHT): 71 MS
ECHO MV VTI: 35.7 CM
ECHO PV MAX VELOCITY: 91.9 CM/S
ECHO PV PEAK INSTANTANEOUS GRADIENT SYSTOLIC: 3 MMHG
ECHO PV PEAK INSTANTANEOUS GRADIENT SYSTOLIC: 6 MMHG
ECHO PV REGURGITANT MAX VELOCITY: 127 CM/S
ECHO RA AREA 4C: 17.26 CM2
ECHO RIGHT VENTRICULAR SYSTOLIC PRESSURE (RVSP): 62 MMHG
ECHO RV INTERNAL DIMENSION: 4.08 CM
ECHO RV TAPSE: 1.6 CM (ref 1.5–2)
ECHO TV MAX VELOCITY: 341 CM/S
ECHO TV REGURGITANT PEAK GRADIENT: 47 MMHG
ERYTHROCYTE [DISTWIDTH] IN BLOOD BY AUTOMATED COUNT: 20.3 % (ref 11.5–14.5)
GLOBULIN SER CALC-MCNC: 3 G/DL (ref 2–4)
GLUCOSE SERPL-MCNC: 123 MG/DL (ref 65–100)
HCT VFR BLD AUTO: 28.1 % (ref 36.6–50.3)
HGB BLD-MCNC: 8.2 G/DL (ref 12.1–17)
MCH RBC QN AUTO: 20.2 PG (ref 26–34)
MCHC RBC AUTO-ENTMCNC: 29.2 G/DL (ref 30–36.5)
MCV RBC AUTO: 69.4 FL (ref 80–99)
MV DEC SLOPE: 5020 MM/S2
MV DEC SLOPE: 5020 MM/S2
NRBC # BLD: 0.05 K/UL (ref 0–0.01)
NRBC BLD-RTO: 0.4 PER 100 WBC
PLATELET # BLD AUTO: 393 K/UL (ref 150–400)
PMV BLD AUTO: 10.6 FL (ref 8.9–12.9)
POTASSIUM SERPL-SCNC: 4.1 MMOL/L (ref 3.5–5.1)
PROT SERPL-MCNC: 5.9 G/DL (ref 6.4–8.2)
RBC # BLD AUTO: 4.05 M/UL (ref 4.1–5.7)
SODIUM SERPL-SCNC: 140 MMOL/L (ref 136–145)
SPECIAL REQUESTS,SREQ: NORMAL
WBC # BLD AUTO: 11.9 K/UL (ref 4.1–11.1)

## 2021-11-18 PROCEDURE — 74011250637 HC RX REV CODE- 250/637: Performed by: INTERNAL MEDICINE

## 2021-11-18 PROCEDURE — 74011250637 HC RX REV CODE- 250/637: Performed by: FAMILY MEDICINE

## 2021-11-18 PROCEDURE — 94640 AIRWAY INHALATION TREATMENT: CPT

## 2021-11-18 PROCEDURE — 36415 COLL VENOUS BLD VENIPUNCTURE: CPT

## 2021-11-18 PROCEDURE — 74011000258 HC RX REV CODE- 258: Performed by: FAMILY MEDICINE

## 2021-11-18 PROCEDURE — 65270000029 HC RM PRIVATE

## 2021-11-18 PROCEDURE — 85027 COMPLETE CBC AUTOMATED: CPT

## 2021-11-18 PROCEDURE — 83880 ASSAY OF NATRIURETIC PEPTIDE: CPT

## 2021-11-18 PROCEDURE — 93306 TTE W/DOPPLER COMPLETE: CPT

## 2021-11-18 PROCEDURE — 74011250636 HC RX REV CODE- 250/636: Performed by: FAMILY MEDICINE

## 2021-11-18 PROCEDURE — 94760 N-INVAS EAR/PLS OXIMETRY 1: CPT

## 2021-11-18 PROCEDURE — 80053 COMPREHEN METABOLIC PANEL: CPT

## 2021-11-18 PROCEDURE — 74011250636 HC RX REV CODE- 250/636: Performed by: INTERNAL MEDICINE

## 2021-11-18 RX ORDER — PANTOPRAZOLE SODIUM 40 MG/1
40 TABLET, DELAYED RELEASE ORAL DAILY
Qty: 60 TABLET | Refills: 0 | Status: SHIPPED | OUTPATIENT
Start: 2021-11-19 | End: 2022-03-02

## 2021-11-18 RX ORDER — FUROSEMIDE 40 MG/1
TABLET ORAL
Qty: 30 TABLET | Refills: 0 | Status: SHIPPED | OUTPATIENT
Start: 2021-11-19 | End: 2022-03-02

## 2021-11-18 RX ORDER — FAMOTIDINE 20 MG/1
20 TABLET, FILM COATED ORAL 2 TIMES DAILY
Qty: 60 TABLET | Refills: 0 | Status: SHIPPED | OUTPATIENT
Start: 2021-11-18 | End: 2022-03-02

## 2021-11-18 RX ORDER — ALBUTEROL SULFATE 90 UG/1
2 AEROSOL, METERED RESPIRATORY (INHALATION)
Qty: 18 G | Refills: 1 | Status: SHIPPED | OUTPATIENT
Start: 2021-11-18 | End: 2022-03-02

## 2021-11-18 RX ORDER — PREDNISONE 10 MG/1
TABLET ORAL
Qty: 20 TABLET | Refills: 0 | Status: SHIPPED | OUTPATIENT
Start: 2021-11-18 | End: 2022-03-02

## 2021-11-18 RX ORDER — BUDESONIDE AND FORMOTEROL FUMARATE DIHYDRATE 160; 4.5 UG/1; UG/1
2 AEROSOL RESPIRATORY (INHALATION) 2 TIMES DAILY
Qty: 10.2 G | Refills: 1 | Status: SHIPPED | OUTPATIENT
Start: 2021-11-18 | End: 2022-03-02

## 2021-11-18 RX ORDER — AMOXICILLIN AND CLAVULANATE POTASSIUM 875; 125 MG/1; MG/1
1 TABLET, FILM COATED ORAL 2 TIMES DAILY
Qty: 20 TABLET | Refills: 0 | Status: SHIPPED | OUTPATIENT
Start: 2021-11-18 | End: 2022-03-02

## 2021-11-18 RX ADMIN — METHYLPREDNISOLONE SODIUM SUCCINATE 40 MG: 40 INJECTION, POWDER, FOR SOLUTION INTRAMUSCULAR; INTRAVENOUS at 05:01

## 2021-11-18 RX ADMIN — METHYLPREDNISOLONE SODIUM SUCCINATE 40 MG: 40 INJECTION, POWDER, FOR SOLUTION INTRAMUSCULAR; INTRAVENOUS at 15:52

## 2021-11-18 RX ADMIN — FUROSEMIDE 40 MG: 40 TABLET ORAL at 10:17

## 2021-11-18 RX ADMIN — PIPERACILLIN SODIUM AND TAZOBACTAM SODIUM 3.38 G: 3; .375 INJECTION, POWDER, LYOPHILIZED, FOR SOLUTION INTRAVENOUS at 12:33

## 2021-11-18 RX ADMIN — BUDESONIDE AND FORMOTEROL FUMARATE DIHYDRATE 2 PUFF: 160; 4.5 AEROSOL RESPIRATORY (INHALATION) at 20:28

## 2021-11-18 RX ADMIN — METHYLPREDNISOLONE SODIUM SUCCINATE 40 MG: 40 INJECTION, POWDER, FOR SOLUTION INTRAMUSCULAR; INTRAVENOUS at 20:33

## 2021-11-18 RX ADMIN — Medication 10 ML: at 15:52

## 2021-11-18 RX ADMIN — TRAMADOL HYDROCHLORIDE 50 MG: 50 TABLET, COATED ORAL at 20:34

## 2021-11-18 RX ADMIN — PIPERACILLIN SODIUM AND TAZOBACTAM SODIUM 3.38 G: 3; .375 INJECTION, POWDER, LYOPHILIZED, FOR SOLUTION INTRAVENOUS at 20:33

## 2021-11-18 RX ADMIN — Medication 10 ML: at 20:35

## 2021-11-18 RX ADMIN — PIPERACILLIN SODIUM AND TAZOBACTAM SODIUM 3.38 G: 3; .375 INJECTION, POWDER, LYOPHILIZED, FOR SOLUTION INTRAVENOUS at 04:02

## 2021-11-18 RX ADMIN — Medication 10 ML: at 05:01

## 2021-11-18 RX ADMIN — FAMOTIDINE 20 MG: 20 TABLET, FILM COATED ORAL at 20:34

## 2021-11-18 RX ADMIN — TRAZODONE HYDROCHLORIDE 25 MG: 50 TABLET ORAL at 20:34

## 2021-11-18 RX ADMIN — FAMOTIDINE 20 MG: 20 TABLET, FILM COATED ORAL at 10:17

## 2021-11-18 RX ADMIN — PANTOPRAZOLE SODIUM 40 MG: 40 TABLET, DELAYED RELEASE ORAL at 10:17

## 2021-11-18 RX ADMIN — BUDESONIDE AND FORMOTEROL FUMARATE DIHYDRATE 2 PUFF: 160; 4.5 AEROSOL RESPIRATORY (INHALATION) at 07:28

## 2021-11-18 NOTE — PROGRESS NOTES
Possible dc home today per MD.       Problem: Falls - Risk of  Goal: *Absence of Falls  Description: Document Deontearashe Vega Fall Risk and appropriate interventions in the flowsheet. Outcome: Progressing Towards Goal  Note: Fall Risk Interventions:  Mobility Interventions: Bed/chair exit alarm, OT consult for ADLs, Patient to call before getting OOB, PT Consult for mobility concerns         Medication Interventions: Bed/chair exit alarm, Patient to call before getting OOB    Elimination Interventions: Bed/chair exit alarm, Call light in reach, Patient to call for help with toileting needs, Toileting schedule/hourly rounds    History of Falls Interventions: Bed/chair exit alarm, Door open when patient unattended, Investigate reason for fall         Problem: Falls - Risk of  Goal: *Absence of Falls  Description: Document Joseluise Vega Fall Risk and appropriate interventions in the flowsheet.   Outcome: Progressing Towards Goal  Note: Fall Risk Interventions:  Mobility Interventions: Bed/chair exit alarm, OT consult for ADLs, Patient to call before getting OOB, PT Consult for mobility concerns         Medication Interventions: Bed/chair exit alarm, Patient to call before getting OOB    Elimination Interventions: Bed/chair exit alarm, Call light in reach, Patient to call for help with toileting needs, Toileting schedule/hourly rounds    History of Falls Interventions: Bed/chair exit alarm, Door open when patient unattended, Investigate reason for fall         Problem: Patient Education: Go to Patient Education Activity  Goal: Patient/Family Education  Outcome: Progressing Towards Goal     Problem: Patient Education: Go to Patient Education Activity  Goal: Patient/Family Education  Outcome: Progressing Towards Goal

## 2021-11-18 NOTE — PROGRESS NOTES
Patient now unable to leave until possibly tomorrow. Called LM to Dr. Tani Robles if they still want to do colonoscopy since he will be in the hospital. Have not heard back yet.

## 2021-11-18 NOTE — PROGRESS NOTES
Progress Note    Patient: Harvinder Smith MRN: [id number]  SSN: [id number]    YOB: 1940  Age: [de-identified] y.o. Sex: male      Admit Date: 11/15/2021    LOS: 3 days     Subjective:     No acute events overnight    Objective:     Vitals:    11/17/21 1930 11/17/21 2047 11/17/21 2327 11/18/21 0704   BP:   (!) 143/77 (!) 147/57   Pulse:   63 64   Resp:   18 20   Temp:   97.4 °F (36.3 °C) 98.4 °F (36.9 °C)   SpO2: 93%  99% 95%   Weight:  80.1 kg (176 lb 9.4 oz)     Height:            Intake and Output:  Current Shift: No intake/output data recorded. Last three shifts: 11/16 1901 - 11/18 0700  In: 400 [I.V.:400]  Out: 1800 [Urine:1800]    Physical Exam:   General:  Alert, cooperative, no distress, appears stated age. Eyes:  Conjunctivae/corneas clear. PERRL, EOMs intact. Fundi benign   Ears:  Normal TMs and external ear canals both ears. Nose: Nares normal. Septum midline. Mucosa normal. No drainage or sinus tenderness. Mouth/Throat: Lips, mucosa, and tongue normal. Teeth and gums normal.   Neck: Supple, symmetrical, trachea midline, no adenopathy, thyroid: no enlargment/tenderness/nodules, no carotid bruit and no JVD. Back:   Symmetric, no curvature. ROM normal. No CVA tenderness. Lungs:   Clear to auscultation bilaterally. Heart:  Regular rate and rhythm, S1, S2 normal, no murmur, click, rub or gallop. Abdomen:   Soft, non-tender. Bowel sounds normal. No masses,  No organomegaly. Extremities: Extremities normal, atraumatic, no cyanosis or edema. Pulses: 2+ and symmetric all extremities. Skin: Skin color, texture, turgor normal. No rashes or lesions   Lymph nodes: Cervical, supraclavicular, and axillary nodes normal.   Neurologic: CNII-XII intact. Normal strength, sensation and reflexes throughout. Lab/Data Review: All lab results for the last 24 hours reviewed.          Assessment:     Active Problems:    CHF (congestive heart failure) ([facility] (11/15/2021)      PNA (pneumonia) (11/15/2021)       Patient is an [de-identified] white male with:  1. Atrial fibrillation with rapid ventricular rate  2. Alcohol abuse  3. Nicotine dependence  4. Coronary artery status post PCI  5. Hyperlipidemia  6. Frequent falls  7. Heart failure with reduced ejection fraction  8. Anemia    Plan:      Patient complains of abdominal pain. Hemoglobin is up from 6.8-8.2. Platelet is 440. He was seen by GI. Creatinine is 1.38 and BUN at 30. He is laying flat and appears to be comfortable. Currently on oral Lasix. Echocardiogram pending. His aspirin is on hold. Noted for possible endoscopy.   Hold beta-blocker    Signed By: Hurley Gilford, MD     November 18, 2021

## 2021-11-18 NOTE — DISCHARGE SUMMARY
General Daily Progress Note          Patient Name:   Sandy Segura       YOB: 1940       Age:  [de-identified] y.o. Admit Date: 11/15/2021      Subjective:     Chayo Fuelling y. o. male with a past medical history significant for coronary disease s/p PCI, dilated cardiomyopathy, afib, alcohol abuse, current smoker 2ppd. On 11/15/2021, presented to the ED with chief complaint of Shortness of Breath and sharp non-exertional non-radiating chest pain. On ED work-up was found to be in afib with elevated troponin and elevated BNP. Concern for NSTEMI. Admitted for further evaluation and treatment.       11/18:  Patient seen and examined. He is lying in bed on room air. Presently has mild abdominal pain that he says is chronic. Otherwise has no complaints. Denies headache, dizziness, chest pain, shortness of breath, nausea, vomiting, changes in bowel/bladder, fevers, or chills.     Scheduled for colonoscopy tomorrow    Objective:     Visit Vitals  BP (!) 147/57 (BP 1 Location: Left upper arm, BP Patient Position: At rest;Supine)   Pulse 64   Temp 98.4 °F (36.9 °C)   Resp 20   Ht 5' 11.5\" (1.816 m)   Wt 80.1 kg (176 lb 9.4 oz)   SpO2 95%   BMI 24.29 kg/m²        Recent Results (from the past 24 hour(s))   NT-PRO BNP    Collection Time: 11/18/21  6:32 AM   Result Value Ref Range    NT pro-BNP 5,079 (H) <450 pg/mL   CBC W/O DIFF    Collection Time: 11/18/21  6:32 AM   Result Value Ref Range    WBC 11.9 (H) 4.1 - 11.1 K/uL    RBC 4.05 (L) 4.10 - 5.70 M/uL    HGB 8.2 (L) 12.1 - 17.0 g/dL    HCT 28.1 (L) 36.6 - 50.3 %    MCV 69.4 (L) 80.0 - 99.0 FL    MCH 20.2 (L) 26.0 - 34.0 PG    MCHC 29.2 (L) 30.0 - 36.5 g/dL    RDW 20.3 (H) 11.5 - 14.5 %    PLATELET 563 885 - 143 K/uL    MPV 10.6 8.9 - 12.9 FL    NRBC 0.4 (H) 0.0  WBC    ABSOLUTE NRBC 0.05 (H) 0.00 - 8.00 K/uL   METABOLIC PANEL, COMPREHENSIVE    Collection Time: 11/18/21  6:32 AM   Result Value Ref Range    Sodium 140 136 - 145 mmol/L    Potassium 4.1 3.5 - 5.1 mmol/L    Chloride 106 97 - 108 mmol/L    CO2 27 21 - 32 mmol/L    Anion gap 7 5 - 15 mmol/L    Glucose 123 (H) 65 - 100 mg/dL    BUN 23 (H) 6 - 20 mg/dL    Creatinine 1.00 0.70 - 1.30 mg/dL    BUN/Creatinine ratio 23 (H) 12 - 20      GFR est AA >60 >60 ml/min/1.73m2    GFR est non-AA >60 >60 ml/min/1.73m2    Calcium 8.3 (L) 8.5 - 10.1 mg/dL    Bilirubin, total 0.6 0.2 - 1.0 mg/dL    AST (SGOT) 23 15 - 37 U/L    ALT (SGPT) 28 12 - 78 U/L    Alk.  phosphatase 62 45 - 117 U/L    Protein, total 5.9 (L) 6.4 - 8.2 g/dL    Albumin 2.9 (L) 3.5 - 5.0 g/dL    Globulin 3.0 2.0 - 4.0 g/dL    A-G Ratio 1.0 (L) 1.1 - 2.2     ECHO ADULT COMPLETE    Collection Time: 11/18/21  9:10 AM   Result Value Ref Range    Aortic Regurgitant Pressure Half-time 540.00 ms    AR Max Dev 305.00 cm/s    Aortic Valve Systolic Peak Velocity 535.38 cm/s    AoV PG 7.00 mmHg    Ao Root D 3.60 cm    IVSd 1.02 (A) 0.6 - 1.0 cm    LVIDd 4.24 4.2 - 5.9 cm    LVIDs 2.89 cm    LVOT Peak Velocity 84.90 cm/s    LVOT Peak Gradient 3.00 mmHg    LVPWd 1.11 (A) 0.6 - 1.0 cm    LV E' Septal Velocity 6.73 cm/s    LV ED Vol A2C 76.20 cm3    LV ES Vol A2C 24.10 cm3    E/E' septal 20.51     Left Atrium Major Axis 5.60 cm    Mitral Valve Max Velocity 124.00 cm/s    MV Peak Gradient 6.00 mmHg    Mitral Valve Deceleration [place] 5,020.00 mm/s2    Mitral Valve Deceleration [place] 5,020.00 mm/s2    Mitral Valve E Wave Deceleration Time 225.00 ms    Mitral Valve Pressure Half-time 71.00 ms    MV A Dev 107.00 cm/s    MV E Dev 138.00 cm/s    MV Mean Gradient 2.00 mmHg    Mitral Valve Annulus Velocity Time Integral 35.70 cm    MVA (PHT) 3.10 cm2    MV E/A 1.29     Pulmonic Regurgitant End Max Velocity 127.00 cm/s    Pulmonic Valve Systolic Peak Instantaneous Gradient 6.00 mmHg    Pulmonic Valve Max Velocity 91.90 cm/s    Pulmonic Valve Systolic Peak Instantaneous Gradient 3.00 mmHg    Est. RA Pressure 15.00 mmHg    RVIDd 4.08 cm    RVSP 62.00 mmHg    Tricuspid Valve Max Velocity 341.00 cm/s    Triscuspid Valve Regurgitation Peak Gradient 47.00 mmHg    Tapse 1.60 1.5 - 2.0 cm    Right Atrial Area 4C 17.26 cm2    LA Area 4C 22.26 cm2    BP EF 60.3 55 - 100 %    LV Mass .2 88 - 224 g    LV Mass AL Index 75.7 49 - 115 g/m2    Left Atrium Minor Axis 2.79 cm           Review of Systems    Constitutional: Negative for chills and fever. HENT: Negative. Eyes: Negative. Respiratory: Negative. Cardiovascular: Negative. Gastrointestinal: Chronic abdominal pain. Negative for nausea. Skin: Negative. Neurological: Negative. Physical Exam:      Constitutional: Elderly male, sitting up in bed, in no acute distress. HENT:   Head: Normocephalic and atraumatic. Eyes: Pupils are equal, round, and reactive to light. EOM are normal.   Cardiovascular: Normal rate, regular rhythm and normal heart sounds. Pulmonary/Chest: Breath sounds normal. No wheezes. No rales. Exhibits no tenderness. Abdominal: Soft. Bowel sounds are normal. There is no abdominal tenderness. There is no rebound and no guarding. Musculoskeletal: Normal range of motion. Neurological: pt is alert and oriented to person, place, and time. XR CHEST SNGL V   Final Result   1. Bilateral pleural effusions with bilateral airspace disease or edema. 2. Multiple right-sided rib fractures, probably old, recommend follow-up as   warranted.            Recent Results (from the past 24 hour(s))   NT-PRO BNP    Collection Time: 11/18/21  6:32 AM   Result Value Ref Range    NT pro-BNP 5,079 (H) <450 pg/mL   CBC W/O DIFF    Collection Time: 11/18/21  6:32 AM   Result Value Ref Range    WBC 11.9 (H) 4.1 - 11.1 K/uL    RBC 4.05 (L) 4.10 - 5.70 M/uL    HGB 8.2 (L) 12.1 - 17.0 g/dL    HCT 28.1 (L) 36.6 - 50.3 %    MCV 69.4 (L) 80.0 - 99.0 FL    MCH 20.2 (L) 26.0 - 34.0 PG    MCHC 29.2 (L) 30.0 - 36.5 g/dL    RDW 20.3 (H) 11.5 - 14.5 %    PLATELET 416 822 - 418 K/uL    MPV 10.6 8.9 - 12.9 FL    NRBC 0.4 (H) 0.0  WBC    ABSOLUTE NRBC 0.05 (H) 0.00 - 6.25 K/uL   METABOLIC PANEL, COMPREHENSIVE    Collection Time: 11/18/21  6:32 AM   Result Value Ref Range    Sodium 140 136 - 145 mmol/L    Potassium 4.1 3.5 - 5.1 mmol/L    Chloride 106 97 - 108 mmol/L    CO2 27 21 - 32 mmol/L    Anion gap 7 5 - 15 mmol/L    Glucose 123 (H) 65 - 100 mg/dL    BUN 23 (H) 6 - 20 mg/dL    Creatinine 1.00 0.70 - 1.30 mg/dL    BUN/Creatinine ratio 23 (H) 12 - 20      GFR est AA >60 >60 ml/min/1.73m2    GFR est non-AA >60 >60 ml/min/1.73m2    Calcium 8.3 (L) 8.5 - 10.1 mg/dL    Bilirubin, total 0.6 0.2 - 1.0 mg/dL    AST (SGOT) 23 15 - 37 U/L    ALT (SGPT) 28 12 - 78 U/L    Alk.  phosphatase 62 45 - 117 U/L    Protein, total 5.9 (L) 6.4 - 8.2 g/dL    Albumin 2.9 (L) 3.5 - 5.0 g/dL    Globulin 3.0 2.0 - 4.0 g/dL    A-G Ratio 1.0 (L) 1.1 - 2.2     ECHO ADULT COMPLETE    Collection Time: 11/18/21  9:10 AM   Result Value Ref Range    Aortic Regurgitant Pressure Half-time 540.00 ms    AR Max Dev 305.00 cm/s    Aortic Valve Systolic Peak Velocity 353.78 cm/s    AoV PG 7.00 mmHg    Ao Root D 3.60 cm    IVSd 1.02 (A) 0.6 - 1.0 cm    LVIDd 4.24 4.2 - 5.9 cm    LVIDs 2.89 cm    LVOT Peak Velocity 84.90 cm/s    LVOT Peak Gradient 3.00 mmHg    LVPWd 1.11 (A) 0.6 - 1.0 cm    LV E' Septal Velocity 6.73 cm/s    LV ED Vol A2C 76.20 cm3    LV ES Vol A2C 24.10 cm3    E/E' septal 20.51     Left Atrium Major Axis 5.60 cm    Mitral Valve Max Velocity 124.00 cm/s    MV Peak Gradient 6.00 mmHg    Mitral Valve Deceleration [place] 5,020.00 mm/s2    Mitral Valve Deceleration [place] 5,020.00 mm/s2    Mitral Valve E Wave Deceleration Time 225.00 ms    Mitral Valve Pressure Half-time 71.00 ms    MV A Dev 107.00 cm/s    MV E Dev 138.00 cm/s    MV Mean Gradient 2.00 mmHg    Mitral Valve Annulus Velocity Time Integral 35.70 cm    MVA (PHT) 3.10 cm2    MV E/A 1.29     Pulmonic Regurgitant End Max Velocity 127.00 cm/s    Pulmonic Valve Systolic Peak Instantaneous Gradient 6.00 mmHg    Pulmonic Valve Max Velocity 91.90 cm/s    Pulmonic Valve Systolic Peak Instantaneous Gradient 3.00 mmHg    Est. RA Pressure 15.00 mmHg    RVIDd 4.08 cm    RVSP 62.00 mmHg    Tricuspid Valve Max Velocity 341.00 cm/s    Triscuspid Valve Regurgitation Peak Gradient 47.00 mmHg    Tapse 1.60 1.5 - 2.0 cm    Right Atrial Area 4C 17.26 cm2    LA Area 4C 22.26 cm2    BP EF 60.3 55 - 100 %    LV Mass .2 88 - 224 g    LV Mass AL Index 75.7 49 - 115 g/m2    Left Atrium Minor Axis 2.79 cm       Results     Procedure Component Value Units Date/Time    COVID-19 RAPID TEST [[id number]] Collected: 11/15/21 2100    Order Status: Completed Specimen: Nasopharyngeal Updated: 11/15/21 2148     Specimen source       Please find results under separate order           COVID-19 rapid test Not Detected        Comment: Rapid Abbott ID Now   Rapid NAAT:  The specimen is NEGATIVE for SARS-CoV-2, the novel coronavirus associated with COVID-19. Negative results should be treated as presumptive and, if inconsistent with clinical signs and symptoms or necessary for patient management, should be tested with an alternative molecular assay. Negative results do not preclude SARS-CoV-2 infection and should not be used as the sole basis for patient management decisions. This test has been authorized by the FDA under   an Emergency Use Authorization (EUA) for use by authorized laboratories.  Fact sheet for Healthcare Providers: ConventionUpdate.co.nz Fact sheet for Patients: ConventionUpdate.co.nz   Methodology: Isothermal Nucleic Acid Amplification         CULTURE, BLOOD #1 [[id number]] Collected: 11/15/21 1245    Order Status: Canceled Specimen: Blood     CULTURE, BLOOD #2 [[id number]] Collected: 11/15/21 1245    Order Status: Canceled Specimen: Blood     CULTURE, BLOOD, PAIRED [[id number]] Collected: 11/15/21 1200    Order Status: Completed Specimen: Blood Updated: 11/18/21 6529     Special Requests: No Special Requests        Culture result:       One of four bottles has been flagged positive by instrument. Bottle has been sent to [facility] laboratory to assess for possible growth. Gram Positive Cocci in clusters CALLED TO AND READ BACK BY Lauren Rod R.N. 0800 11/18/2021 BY SINDYW          CULTURE, BLOOD [[id number]] Collected: 11/15/21 1045    Order Status: Canceled Specimen: Blood            Labs:     Recent Labs     11/18/21 0632 11/17/21 0420   WBC 11.9* 14.7*   HGB 8.2* 8.2*   HCT 28.1* 28.0*    391     Recent Labs     11/18/21 0632 11/17/21 0420 11/16/21 0423    136 138   K 4.1 3.8 4.1    104 104   CO2 27 25 28   BUN 23* 30* 19   CREA 1.00 1.38* 1.19   * 138* 143*   CA 8.3* 8.8 8.6     Recent Labs     11/18/21 0632 11/17/21 0420 11/16/21 0423   ALT 28 14 14   AP 62 68 75   TBILI 0.6 0.5 0.5   TP 5.9* 6.5 6.3*   ALB 2.9* 2.8* 3.2*   GLOB 3.0 3.7 3.1     No results for input(s): INR, PTP, APTT, INREXT, INREXT in the last 72 hours. No results for input(s): FE, TIBC, PSAT, FERR in the last 72 hours. No results found for: FOL, RBCF   No results for input(s): PH, PCO2, PO2 in the last 72 hours. No results for input(s): CPK, CKNDX, TROIQ in the last 72 hours.     No lab exists for component: CPKMB  No results found for: CHOL, CHOLX, CHLST, CHOLV, HDL, HDLP, LDL, LDLC, DLDLP, TGLX, TRIGL, TRIGP, CHHD, CHHDX  No results found for: [facility]  Lab Results   Component Value Date/Time    Color Yellow/Straw 11/15/2021 09:35 AM    Appearance Clear 11/15/2021 09:35 AM    Specific gravity 1.016 11/15/2021 09:35 AM    pH (UA) 5.0 11/15/2021 09:35 AM    Protein 30 (A) 11/15/2021 09:35 AM    Glucose Negative 11/15/2021 09:35 AM    Ketone Negative 11/15/2021 09:35 AM    Bilirubin Negative 11/15/2021 09:35 AM    Urobilinogen 2.0 (H) 11/15/2021 09:35 AM    Nitrites Negative 11/15/2021 09:35 AM    Leukocyte Esterase Negative 11/15/2021 09:35 AM    Bacteria Negative 11/15/2021 09:35 AM    WBC 0-4 11/15/2021 09:35 AM    RBC 0-5 11/15/2021 09:35 AM         Assessment:     Anemia d/t GI bleed  · 11/17 EGD showed chronic gastritis without acute bleed  · Scheduled for colonoscopy today    Congestive heart failure with reduced EF  · CXR:  Bilateral pleural effusions with bilateral airspace disease or edema. Multiple right-sided rib fractures, probably old, recommend follow-up as warranted. Acute hypoxic respiratory failure    COPD with severe acute exacerbation    Atrial Fibrillation with RVR, now bradycardia  · On metoprolol for rate control and aspirin 81mg daily. Congestive heart failure r/o cor pulmonale    Prerenal azotemia    Bacteremia, gram positive cocci  · One of four bottles. Likely due to contamination. GERD    Hypertension     Hyperlipidemia     Alcohol Abuse     Nicotine dependence     Frequent falls     Plan:     Medications  · HOLD Aspirin 81 mg PO daily  · Symbicort BID  · Famotidine 20 mg PO BID  · Lasix 40 mg PO daily  · Solu-Medrol IV 40 mg Q8H  · HOLD Metoprolol succinate 25 mg PO daily - due to bradycardia  · Pantoprazole 40 mg PO daily  · Zosyn IV Q8H  · Trazodone 25 mg PO daily  · Normal saline infusion      Labs/Studies  · Monitor renal function closely  · Colonoscopy tomorrow.   · Awaiting echocardiogram      Consults:  PT: Recommend discharge to SNF    RT: SpO2 on room air: 95% at rest, 96% on ambulation      Discharge: to [address]    Has a hemoglobin stable no active bleeding patient breathing well denies any chest pain shortness of breath nausea vomiting today if cleared by the cardiology and the GI patient can be discharged home

## 2021-11-18 NOTE — PROGRESS NOTES
PULMONARY NOTE  [facility]    Name: Kristi Mosqueda MRN: [id number]   : 1940 Hospital: [address] [place] [address]   Date: 2021  Admission date: 11/15/2021 Hospital Day: 4       HPI:     Hospital Problems  Date Reviewed: 2021          Codes Class Noted POA    CHF (congestive heart failure) ([facility] [address] ICD-10-CM: I50.9  ICD-9-CM: 428.0  11/15/2021 Unknown        PNA (pneumonia) ICD-10-CM: J18.9  ICD-9-CM: 486  11/15/2021 Unknown                   [x] High complexity decision making was performed  [x] See my orders for details      Subjective/Initial History:     I was asked by Hellen Howell MD to see Kristi Mosqueda  a [de-identified] y.o.    male in consultation     Excerpts from admission 11/15/2021 or consult notes as follows:   46-year-old male came in because of shortness of breath and dyspnea was found to be in atrial fibrillation, he smokes 2 packs/day has been smoking since the age of 7 denies any history of chest pain but complaining about shortness of breath and dyspnea for the past 1 to 2 weeks x-ray was done which shows bilateral pleural effusion and some infiltrate and history of multiple right-sided rib fracture probably old so admitted and pulmonary consult was called for further evaluation      No Known Allergies     MAR reviewed and pertinent medications noted or modified as needed     Current Facility-Administered Medications   Medication    0.9% sodium chloride infusion    sodium chloride (NS) flush 5-40 mL    sodium chloride (NS) flush 5-40 mL    furosemide (LASIX) tablet 40 mg    peg 3350-electrolytes (COLYTE) 4000 mL    budesonide-formoteroL (SYMBICORT) 160-4.5 mcg/actuation HFA inhaler 2 Puff    0.9% sodium chloride infusion 250 mL    methylPREDNISolone (PF) (SOLU-MEDROL) injection 40 mg    pantoprazole (PROTONIX) tablet 40 mg    0.9% sodium chloride infusion 250 mL    famotidine (PEPCID) tablet 20 mg    traMADoL (ULTRAM) tablet 50 mg    glucagon (GLUCAGEN) injection 1 mg    acetaminophen (TYLENOL) tablet 650 mg    Or    acetaminophen (TYLENOL) suppository 650 mg    polyethylene glycol (MIRALAX) packet 17 g    ondansetron (ZOFRAN ODT) tablet 4 mg    Or    ondansetron (ZOFRAN) injection 4 mg    piperacillin-tazobactam (ZOSYN) 3.375 g in 0.9% sodium chloride (MBP/ADV) 100 mL MBP    [Held by provider] aspirin chewable tablet 81 mg    albuterol (PROVENTIL HFA, VENTOLIN HFA, PROAIR HFA) inhaler 2 Puff    influenza vaccine  (6 mos+)(PF) (FLUARIX/FLULAVAL/FLUZONE QUAD) injection 0.5 mL    traZODone (DESYREL) tablet 25 mg      Patient PCP: Alfonzo Ng MD  PMH:  has a past medical history of GERD (gastroesophageal reflux disease) and Hypertension. PSH:   has no past surgical history on file. FHX: family history is not on file. SHX:  reports that he has been smoking. He has been smoking about 2.00 packs per day. He has never used smokeless tobacco.     ROS:    Review of Systems   Constitutional: Positive for malaise/fatigue. HENT: Negative. Eyes: Negative. Respiratory: Positive for cough, sputum production and wheezing. Cardiovascular: Negative. Gastrointestinal: Negative. Genitourinary: Negative. Musculoskeletal: Negative. Skin: Negative. Neurological: Negative. Psychiatric/Behavioral: Negative.          Objective:     Vital Signs: Telemetry:    AFIB Intake/Output:   Visit Vitals  BP (!) 147/57 (BP 1 Location: Left upper arm, BP Patient Position: At rest;Supine) Comment: nurse notified   Pulse 64   Temp 98.4 °F (36.9 °C)   Resp 20   Ht 5' 11.5\" (1.816 m)   Wt 80.1 kg (176 lb 9.4 oz)   SpO2 95%   BMI 24.29 kg/m²       Temp (24hrs), Av.9 °F (36.6 °C), Min:97.4 °F (36.3 °C), Max:98.6 °F (37 °C)        O2 Device: None (Room air) O2 Flow Rate (L/min): 2 l/min       Wt Readings from Last 4 Encounters:   21 80.1 kg (176 lb 9.4 oz)          Intake/Output Summary (Last 24 hours) at 2021 0800  Last data filed at 11/17/2021 1803  Gross per 24 hour   Intake 200 ml   Output 500 ml   Net -300 ml       Last shift:      No intake/output data recorded. Last 3 shifts: 11/16 1901 - 11/18 0700  In: 400 [I.V.:400]  Out: 1800 [Urine:1800]       Physical Exam:     Physical Exam  Constitutional:       Appearance: Normal appearance. HENT:      Head: Normocephalic and atraumatic. Nose: Nose normal.      Mouth/Throat:      Mouth: Mucous membranes are moist.   Eyes:      Pupils: Pupils are equal, round, and reactive to light. Cardiovascular:      Rate and Rhythm: Normal rate. Rhythm irregular. Pulses: Normal pulses. Heart sounds: Normal heart sounds. Pulmonary:      Effort: Pulmonary effort is normal.      Breath sounds: Wheezing present. Abdominal:      General: Abdomen is flat. Bowel sounds are normal.   Musculoskeletal:         General: Normal range of motion. Cervical back: Normal range of motion and neck supple. Skin:     General: Skin is warm. Neurological:      Mental Status: He is alert. Labs:    Recent Labs     11/17/21  0420 11/16/21  0423 11/15/21  0935   WBC 14.7* 11.4* 13.6*   HGB 8.2* 6.8* 7.1*    390 405*     Recent Labs     11/17/21  0420 11/16/21  0423 11/15/21  2147 11/15/21  1200 11/15/21  0935 11/15/21  0935    138 139  --    < > 144   K 3.8 4.1 3.5  --    < > 4.2    104 105  --    < > 113*   CO2 25 28 28  --    < > 25   * 143* 202*  --    < > 128*   BUN 30* 19 13  --    < > 15   CREA 1.38* 1.19 1.17  --    < > 1.05   CA 8.8 8.6 8.6  --    < > 8.7   LAC  --   --   --  1.4  --   --    ALB 2.8* 3.2*  --   --   --  3.4*   ALT 14 14  --   --   --  14    < > = values in this interval not displayed. No results for input(s): PH, PCO2, PO2, HCO3, FIO2 in the last 72 hours. No results for input(s): CPK, CKNDX, TROIQ in the last 72 hours.     No lab exists for component: CPKMB  No results found for: BNPP, BNP   Lab Results   Component Value Date/Time    Culture result:  11/15/2021 12:00 PM     One of four bottles has been flagged positive by instrument. Bottle has been sent to [facility] laboratory to assess for possible growth. Gram Positive Cocci in clusters CALLED TO AND READ BACK BY Amy Mercedes R.N. 0800 11/18/2021 BY DPW   No results found for: TSH, TSHEXT, TSHEXT    Imaging:    CXR Results  (Last 48 hours)    None        Results from [place] encounter on 11/15/21    XR CHEST SNGL V    Narrative  Portable upright radiograph chest 10:02 a.m.. No prior study. INDICATION: Shortness of breath. Heart size is at the upper limits of normal.. There are bilateral pleural  effusions with bilateral diffuse airspace disease or edema. Multiple right rib  fractures. No pneumothorax. Degenerative changes of the shoulders and spine. Impression  1. Bilateral pleural effusions with bilateral airspace disease or edema. 2. Multiple right-sided rib fractures, probably old, recommend follow-up as  warranted. No results found for this or any previous visit. IMPRESSION:   1. Acute hypoxic respiratory failure  2. Chronic Obstructive Pulmonary Disease with Severe Acute Exacerbation requiring inpatient hospitalization and management; has very poor airway clearance. Increased work of breathing  3. Atrial fibrillation with RVR  4. Congestive heart failure rule out cor pulmonale  5. Tobacco abuse  6. Anemia      RECOMMENDATIONS/PLAN:     1. Patient is on room air we will try to get pulse ox room air and ambulation  2. Now is in A. fib with RVR cardiology on case  3. Chest x-ray shows most likely congestive heart failure  4. Anemia hemoglobin 6.8 transfuse packed RBC today's hemoglobin 8.2  5. Status post EGD shows gastritis but no bleeding schedule for colonoscopy on Friday  6.  Smoking cessation counseling done offered nicotine patch but patient declined       ·           Omi Garcia MD

## 2021-11-18 NOTE — PROGRESS NOTES
*ATTENTION:  This note has been created by a medical student for educational purposes only. Please do not refer to the content of this note for clinical decision-making, billing, or other purposes. Please see attending physicians note to obtain clinical information on this patient. *     General Daily Progress Note          Patient Name:   Feliz Ulloa       YOB: 1940       Age:  [de-identified] y.o. Admit Date: 11/15/2021      Subjective:     [place] [age] y. o. male with a past medical history significant for coronary disease s/p PCI, dilated cardiomyopathy, afib, alcohol abuse, current smoker 2ppd. On 11/15/2021, presented to the ED with chief complaint of Shortness of Breath and sharp non-exertional non-radiating chest pain. On ED work-up was found to be in afib with elevated troponin and elevated BNP. Concern for NSTEMI. Admitted for further evaluation and treatment.       11/18:  Patient seen and examined. He is lying in bed on room air. Presently has mild abdominal pain that he says is chronic. Otherwise has no complaints. Denies headache, dizziness, chest pain, shortness of breath, nausea, vomiting, changes in bowel/bladder, fevers, or chills. Scheduled for colonoscopy today.     Objective:     Visit Vitals  BP (!) 147/57 (BP 1 Location: Left upper arm, BP Patient Position: At rest;Supine)   Pulse 64   Temp 98.4 °F (36.9 °C)   Resp 20   Ht 5' 11.5\" (1.816 m)   Wt 176 lb 9.4 oz (80.1 kg)   SpO2 95%   BMI 24.29 kg/m²        Recent Results (from the past 24 hour(s))   NT-PRO BNP    Collection Time: 11/18/21  6:32 AM   Result Value Ref Range    NT pro-BNP 5,079 (H) <450 pg/mL   CBC W/O DIFF    Collection Time: 11/18/21  6:32 AM   Result Value Ref Range    WBC 11.9 (H) 4.1 - 11.1 K/uL    RBC 4.05 (L) 4.10 - 5.70 M/uL    HGB 8.2 (L) 12.1 - 17.0 g/dL    HCT 28.1 (L) 36.6 - 50.3 %    MCV 69.4 (L) 80.0 - 99.0 FL    MCH 20.2 (L) 26.0 - 34.0 PG    MCHC 29.2 (L) 30.0 - 36.5 g/dL    RDW 20.3 (H) 11.5 - 14.5 %    PLATELET 562 533 - 795 K/uL    MPV 10.6 8.9 - 12.9 FL    NRBC 0.4 (H) 0.0  WBC    ABSOLUTE NRBC 0.05 (H) 0.00 - 9.44 K/uL   METABOLIC PANEL, COMPREHENSIVE    Collection Time: 11/18/21  6:32 AM   Result Value Ref Range    Sodium 140 136 - 145 mmol/L    Potassium 4.1 3.5 - 5.1 mmol/L    Chloride 106 97 - 108 mmol/L    CO2 27 21 - 32 mmol/L    Anion gap 7 5 - 15 mmol/L    Glucose 123 (H) 65 - 100 mg/dL    BUN 23 (H) 6 - 20 mg/dL    Creatinine 1.00 0.70 - 1.30 mg/dL    BUN/Creatinine ratio 23 (H) 12 - 20      GFR est AA >60 >60 ml/min/1.73m2    GFR est non-AA >60 >60 ml/min/1.73m2    Calcium 8.3 (L) 8.5 - 10.1 mg/dL    Bilirubin, total 0.6 0.2 - 1.0 mg/dL    AST (SGOT) 23 15 - 37 U/L    ALT (SGPT) 28 12 - 78 U/L    Alk.  phosphatase 62 45 - 117 U/L    Protein, total 5.9 (L) 6.4 - 8.2 g/dL    Albumin 2.9 (L) 3.5 - 5.0 g/dL    Globulin 3.0 2.0 - 4.0 g/dL    A-G Ratio 1.0 (L) 1.1 - 2.2     ECHO ADULT COMPLETE    Collection Time: 11/18/21  9:10 AM   Result Value Ref Range    Aortic Regurgitant Pressure Half-time 540.00 ms    AR Max Dev 305.00 cm/s    Aortic Valve Systolic Peak Velocity 785.01 cm/s    AoV PG 7.00 mmHg    Ao Root D 3.60 cm    IVSd 1.02 (A) 0.6 - 1.0 cm    LVIDd 4.24 4.2 - 5.9 cm    LVIDs 2.89 cm    LVOT Peak Velocity 84.90 cm/s    LVOT Peak Gradient 3.00 mmHg    LVPWd 1.11 (A) 0.6 - 1.0 cm    LV E' Septal Velocity 6.73 cm/s    LV ED Vol A2C 76.20 cm3    LV ES Vol A2C 24.10 cm3    E/E' septal 20.51     Left Atrium Major Axis 5.60 cm    Mitral Valve Max Velocity 124.00 cm/s    MV Peak Gradient 6.00 mmHg    Mitral Valve Deceleration [place] 5,020.00 mm/s2    Mitral Valve Deceleration [place] 5,020.00 mm/s2    Mitral Valve E Wave Deceleration Time 225.00 ms    Mitral Valve Pressure Half-time 71.00 ms    MV A Dev 107.00 cm/s    MV E Dev 138.00 cm/s    MV Mean Gradient 2.00 mmHg    Mitral Valve Annulus Velocity Time Integral 35.70 cm    MVA (PHT) 3.10 cm2    MV E/A 1.29     Pulmonic Regurgitant End Max Velocity 127.00 cm/s    Pulmonic Valve Systolic Peak Instantaneous Gradient 6.00 mmHg    Pulmonic Valve Max Velocity 91.90 cm/s    Pulmonic Valve Systolic Peak Instantaneous Gradient 3.00 mmHg    Est. RA Pressure 15.00 mmHg    RVIDd 4.08 cm    RVSP 62.00 mmHg    Tricuspid Valve Max Velocity 341.00 cm/s    Triscuspid Valve Regurgitation Peak Gradient 47.00 mmHg    Tapse 1.60 1.5 - 2.0 cm    Right Atrial Area 4C 17.26 cm2    LA Area 4C 22.26 cm2    BP EF 60.3 55 - 100 %    LV Mass .2 88 - 224 g    LV Mass AL Index 75.7 49 - 115 g/m2    Left Atrium Minor Axis 2.79 cm           Review of Systems    Constitutional: Negative for chills and fever. HENT: Negative. Eyes: Negative. Respiratory: Negative. Cardiovascular: Negative. Gastrointestinal: Chronic abdominal pain. Negative for nausea. Skin: Negative. Neurological: Negative. Physical Exam:      Constitutional: Elderly male, sitting up in bed, in no acute distress. HENT:   Head: Normocephalic and atraumatic. Eyes: Pupils are equal, round, and reactive to light. EOM are normal.   Cardiovascular: Normal rate, regular rhythm and normal heart sounds. Pulmonary/Chest: Breath sounds normal. No wheezes. No rales. Exhibits no tenderness. Abdominal: Soft. Bowel sounds are normal. There is no abdominal tenderness. There is no rebound and no guarding. Musculoskeletal: Normal range of motion. Neurological: pt is alert and oriented to person, place, and time. XR CHEST SNGL V   Final Result   1. Bilateral pleural effusions with bilateral airspace disease or edema. 2. Multiple right-sided rib fractures, probably old, recommend follow-up as   warranted.            Recent Results (from the past 24 hour(s))   NT-PRO BNP    Collection Time: 11/18/21  6:32 AM   Result Value Ref Range    NT pro-BNP 5,079 (H) <450 pg/mL   CBC W/O DIFF    Collection Time: 11/18/21  6:32 AM   Result Value Ref Range    WBC 11.9 (H) 4.1 - 11.1 K/uL    RBC 4.05 (L) 4.10 - 5.70 M/uL    HGB 8.2 (L) 12.1 - 17.0 g/dL    HCT 28.1 (L) 36.6 - 50.3 %    MCV 69.4 (L) 80.0 - 99.0 FL    MCH 20.2 (L) 26.0 - 34.0 PG    MCHC 29.2 (L) 30.0 - 36.5 g/dL    RDW 20.3 (H) 11.5 - 14.5 %    PLATELET 284 830 - 560 K/uL    MPV 10.6 8.9 - 12.9 FL    NRBC 0.4 (H) 0.0  WBC    ABSOLUTE NRBC 0.05 (H) 0.00 - 6.53 K/uL   METABOLIC PANEL, COMPREHENSIVE    Collection Time: 11/18/21  6:32 AM   Result Value Ref Range    Sodium 140 136 - 145 mmol/L    Potassium 4.1 3.5 - 5.1 mmol/L    Chloride 106 97 - 108 mmol/L    CO2 27 21 - 32 mmol/L    Anion gap 7 5 - 15 mmol/L    Glucose 123 (H) 65 - 100 mg/dL    BUN 23 (H) 6 - 20 mg/dL    Creatinine 1.00 0.70 - 1.30 mg/dL    BUN/Creatinine ratio 23 (H) 12 - 20      GFR est AA >60 >60 ml/min/1.73m2    GFR est non-AA >60 >60 ml/min/1.73m2    Calcium 8.3 (L) 8.5 - 10.1 mg/dL    Bilirubin, total 0.6 0.2 - 1.0 mg/dL    AST (SGOT) 23 15 - 37 U/L    ALT (SGPT) 28 12 - 78 U/L    Alk.  phosphatase 62 45 - 117 U/L    Protein, total 5.9 (L) 6.4 - 8.2 g/dL    Albumin 2.9 (L) 3.5 - 5.0 g/dL    Globulin 3.0 2.0 - 4.0 g/dL    A-G Ratio 1.0 (L) 1.1 - 2.2     ECHO ADULT COMPLETE    Collection Time: 11/18/21  9:10 AM   Result Value Ref Range    Aortic Regurgitant Pressure Half-time 540.00 ms    AR Max Dev 305.00 cm/s    Aortic Valve Systolic Peak Velocity 921.75 cm/s    AoV PG 7.00 mmHg    Ao Root D 3.60 cm    IVSd 1.02 (A) 0.6 - 1.0 cm    LVIDd 4.24 4.2 - 5.9 cm    LVIDs 2.89 cm    LVOT Peak Velocity 84.90 cm/s    LVOT Peak Gradient 3.00 mmHg    LVPWd 1.11 (A) 0.6 - 1.0 cm    LV E' Septal Velocity 6.73 cm/s    LV ED Vol A2C 76.20 cm3    LV ES Vol A2C 24.10 cm3    E/E' septal 20.51     Left Atrium Major Axis 5.60 cm    Mitral Valve Max Velocity 124.00 cm/s    MV Peak Gradient 6.00 mmHg    Mitral Valve Deceleration [place] 5,020.00 mm/s2    Mitral Valve Deceleration [place] 5,020.00 mm/s2    Mitral Valve E Wave Deceleration Time 225.00 ms    Mitral Valve Pressure Half-time 71.00 ms    MV A Dev 107.00 cm/s    MV E Dev 138.00 cm/s    MV Mean Gradient 2.00 mmHg    Mitral Valve Annulus Velocity Time Integral 35.70 cm    MVA (PHT) 3.10 cm2    MV E/A 1.29     Pulmonic Regurgitant End Max Velocity 127.00 cm/s    Pulmonic Valve Systolic Peak Instantaneous Gradient 6.00 mmHg    Pulmonic Valve Max Velocity 91.90 cm/s    Pulmonic Valve Systolic Peak Instantaneous Gradient 3.00 mmHg    Est. RA Pressure 15.00 mmHg    RVIDd 4.08 cm    RVSP 62.00 mmHg    Tricuspid Valve Max Velocity 341.00 cm/s    Triscuspid Valve Regurgitation Peak Gradient 47.00 mmHg    Tapse 1.60 1.5 - 2.0 cm    Right Atrial Area 4C 17.26 cm2    LA Area 4C 22.26 cm2    BP EF 60.3 55 - 100 %    LV Mass .2 88 - 224 g    LV Mass AL Index 75.7 49 - 115 g/m2    Left Atrium Minor Axis 2.79 cm       Results     Procedure Component Value Units Date/Time    COVID-19 RAPID TEST [[id number]] Collected: 11/15/21 2100    Order Status: Completed Specimen: Nasopharyngeal Updated: 11/15/21 2148     Specimen source       Please find results under separate order           COVID-19 rapid test Not Detected        Comment: Rapid Abbott ID Now   Rapid NAAT:  The specimen is NEGATIVE for SARS-CoV-2, the novel coronavirus associated with COVID-19. Negative results should be treated as presumptive and, if inconsistent with clinical signs and symptoms or necessary for patient management, should be tested with an alternative molecular assay. Negative results do not preclude SARS-CoV-2 infection and should not be used as the sole basis for patient management decisions. This test has been authorized by the FDA under   an Emergency Use Authorization (EUA) for use by authorized laboratories.  Fact sheet for Healthcare Providers: ConventionUpdate.co.nz Fact sheet for Patients: ConventionUpdate.co.nz   Methodology: Isothermal Nucleic Acid Amplification         CULTURE, BLOOD #1 [[id number]] Collected: 11/15/21 1245    Order Status: Canceled Specimen: Blood     CULTURE, BLOOD #2 [[id number]] Collected: 11/15/21 1245    Order Status: Canceled Specimen: Blood     CULTURE, BLOOD, PAIRED [[id number]] Collected: 11/15/21 1200    Order Status: Completed Specimen: Blood Updated: 11/18/21 0752     Special Requests: No Special Requests        Culture result:       One of four bottles has been flagged positive by instrument. Bottle has been sent to [facility] laboratory to assess for possible growth. Gram Positive Cocci in clusters CALLED TO AND READ BACK BY Baron Shields R.N. 08Adrian 11/18/2021 BY DPW          CULTURE, BLOOD [[id number]] Collected: 11/15/21 1045    Order Status: Canceled Specimen: Blood            Labs:     Recent Labs     11/18/21 0632 11/17/21 0420   WBC 11.9* 14.7*   HGB 8.2* 8.2*   HCT 28.1* 28.0*    391     Recent Labs     11/18/21  0632 11/17/21 0420 11/16/21  0423    136 138   K 4.1 3.8 4.1    104 104   CO2 27 25 28   BUN 23* 30* 19   CREA 1.00 1.38* 1.19   * 138* 143*   CA 8.3* 8.8 8.6     Recent Labs     11/18/21 0632 11/17/21 0420 11/16/21  0423   ALT 28 14 14   AP 62 68 75   TBILI 0.6 0.5 0.5   TP 5.9* 6.5 6.3*   ALB 2.9* 2.8* 3.2*   GLOB 3.0 3.7 3.1     No results for input(s): INR, PTP, APTT, INREXT, INREXT in the last 72 hours. No results for input(s): FE, TIBC, PSAT, FERR in the last 72 hours. No results found for: FOL, RBCF   No results for input(s): PH, PCO2, PO2 in the last 72 hours. No results for input(s): CPK, CKNDX, TROIQ in the last 72 hours.     No lab exists for component: CPKMB  No results found for: CHOL, CHOLX, CHLST, CHOLV, HDL, HDLP, LDL, LDLC, DLDLP, TGLX, TRIGL, TRIGP, CHHD, CHHDX  No results found for: [facility]  Lab Results   Component Value Date/Time    Color Yellow/Straw 11/15/2021 09:35 AM    Appearance Clear 11/15/2021 09:35 AM    Specific gravity 1.016 11/15/2021 09:35 AM    pH (UA) 5.0 11/15/2021 09:35 AM    Protein 30 (A) 11/15/2021 09:35 AM    Glucose Negative 11/15/2021 09:35 AM    Ketone Negative 11/15/2021 09:35 AM    Bilirubin Negative 11/15/2021 09:35 AM    Urobilinogen 2.0 (H) 11/15/2021 09:35 AM    Nitrites Negative 11/15/2021 09:35 AM    Leukocyte Esterase Negative 11/15/2021 09:35 AM    Bacteria Negative 11/15/2021 09:35 AM    WBC 0-4 11/15/2021 09:35 AM    RBC 0-5 11/15/2021 09:35 AM         Assessment:     Anemia d/t GI bleed  · 11/17 EGD showed chronic gastritis without acute bleed  · Scheduled for colonoscopy today    Congestive heart failure with reduced EF  · CXR:  Bilateral pleural effusions with bilateral airspace disease or edema. Multiple right-sided rib fractures, probably old, recommend follow-up as warranted. Acute hypoxic respiratory failure    COPD with severe acute exacerbation    Atrial Fibrillation with RVR, now bradycardia  · On metoprolol for rate control and aspirin 81mg daily. Congestive heart failure r/o cor pulmonale    Prerenal azotemia    Bacteremia, gram positive cocci  · One of four bottles. Likely due to contamination. GERD    Hypertension     Hyperlipidemia     Alcohol Abuse     Nicotine dependence     Frequent falls     Plan:     Medications  · HOLD Aspirin 81 mg PO daily  · Symbicort BID  · Famotidine 20 mg PO BID  · Lasix 40 mg PO daily  · Solu-Medrol IV 40 mg Q8H  · HOLD Metoprolol succinate 25 mg PO daily - due to bradycardia  · Pantoprazole 40 mg PO daily  · Zosyn IV Q8H  · Trazodone 25 mg PO daily  · Normal saline infusion      Labs/Studies  · Monitor renal function closely  · Colonoscopy tomorrow.   · Awaiting echocardiogram      Consults:  PT: Recommend discharge to SNF    RT: SpO2 on room air: 95% at rest, 96% on ambulation      Discharge: to [address]        Current Facility-Administered Medications:     0.9% sodium chloride infusion, 75 mL/hr, IntraVENous, CONTINUOUS, Junius Prost F, NP, Last Rate: 75 mL/hr at 11/17/21 2048, 75 mL/hr at 11/17/21 2048    sodium chloride (NS) flush 5-40 mL, 5-40 mL, IntraVENous, Q8H, Jimenez, Sharon F, NP, 10 mL at 11/18/21 0501    sodium chloride (NS) flush 5-40 mL, 5-40 mL, IntraVENous, PRN, Tomas Louis NP    furosemide (LASIX) tablet 40 mg, 40 mg, Oral, DAILY, Wendy Apodaca MD, 40 mg at 11/18/21 1017    peg 3350-electrolytes (COLYTE) 4000 mL, 1 L, Oral, ONCE, Sharon Jimenez, NP    budesonide-formoteroL (SYMBICORT) 160-4.5 mcg/actuation HFA inhaler 2 Puff, 2 Puff, Inhalation, BID RT, Wendy Apodaca MD, 2 Puff at 11/18/21 0728    0.9% sodium chloride infusion 250 mL, 250 mL, IntraVENous, PRN, Jose Santana MD    methylPREDNISolone (PF) (SOLU-MEDROL) injection 40 mg, 40 mg, IntraVENous, Q8H, Jose Santana MD, 40 mg at 11/18/21 0501    pantoprazole (PROTONIX) tablet 40 mg, 40 mg, Oral, DAILY, Wendy Apodaca MD, 40 mg at 11/18/21 1017    0.9% sodium chloride infusion 250 mL, 250 mL, IntraVENous, PRN, Soha Apodaca MD    famotidine (PEPCID) tablet 20 mg, 20 mg, Oral, BID, Wendy Apodaca MD, 20 mg at 11/18/21 1017    traMADoL (ULTRAM) tablet 50 mg, 50 mg, Oral, Q6H PRN, Wendy Apodaca MD, 50 mg at 11/17/21 2048    glucagon (GLUCAGEN) injection 1 mg, 1 mg, IntraVENous, PRN, Brent Storm MD    acetaminophen (TYLENOL) tablet 650 mg, 650 mg, Oral, Q6H PRN **OR** acetaminophen (TYLENOL) suppository 650 mg, 650 mg, Rectal, Q6H PRN, Soha Apodaca MD    polyethylene glycol (MIRALAX) packet 17 g, 17 g, Oral, DAILY PRN, Soha Apodaca MD    ondansetron (ZOFRAN ODT) tablet 4 mg, 4 mg, Oral, Q8H PRN **OR** ondansetron (ZOFRAN) injection 4 mg, 4 mg, IntraVENous, Q6H PRN, Wendy Apodaca MD    piperacillin-tazobactam (ZOSYN) 3.375 g in 0.9% sodium chloride (MBP/ADV) 100 mL MBP, 3.375 g, IntraVENous, Q8H, Wendy Apodaca MD, Last Rate: 200 mL/hr at 11/18/21 0402, 3.375 g at 11/18/21 0402    [Held by provider] aspirin chewable tablet 81 mg, 81 mg, Oral, DAILY, Saniya Tamayo MD, 81 mg at 11/16/21 0810   albuterol (PROVENTIL HFA, VENTOLIN HFA, PROAIR HFA) inhaler 2 Puff, 2 Puff, Inhalation, Q4H PRN, Jose Santana MD    influenza vaccine 2021-22 (6 mos+)(PF) (FLUARIX/FLULAVAL/FLUZONE QUAD) injection 0.5 mL, 1 Each, IntraMUSCular, PRIOR TO DISCHARGE, Wendy Apodaca MD    traZODone (DESYREL) tablet 25 mg, 25 mg, Oral, QHS, Saniya Tamayo MD, 25 mg at 11/17/21 2050

## 2021-11-18 NOTE — PROGRESS NOTES
CM telephoned Angel Ross, [address] [facility] [phone] with patient update. CM explained medical recs for SNF placement. Ms. Fern Mckeon asked CM to ensure patient his bed at the group home would be available upon his return. CM will continue to update Ms. Fern Mckeon as information available. CM spoke with patient at bedside regarding recs for in-patient rehab. Patient agreeable understanding he could return to [facility] after treatment complete. Patient had no facility preference. Choice letter signed and placed on chart. Referral sent to [facility] via MARGARET and notified liaison via phone.

## 2021-11-18 NOTE — PROGRESS NOTES
Progress Note    Patient: Eric Brewster MRN: [id number]  SSN: [id number]    YOB: 1940  Age: [de-identified] y.o. Sex: male      Admit Date: 11/15/2021    LOS: 3 days     Subjective:   GI in consultation for GI Bleed. Mr Jenifer Hunt resting comfortable at this time. He was to have a Colonoscopy on Friday but he has decided he does not want to have it done. He reports he has had 2 in the past. He did have an EGD on 11/17/2021 which shows gastritis without bleeding. Recommend continuation with PPI. He did receive on unit of PRBC's on 11/16/21. His HgB is stable at 8.2 this morning. Patient reports he is going to rehab at discharge and then will return to assisted Living. History of Present Illness: Jayme Lloyd is a [de-identified] y. o. male who is seen in consultation for GI Bleed. The patient has a past medical history significant for CAD s/p PCI, dilated cardiomyopathy, CHFrEF, atrial fibrillation,. Admits to smoking 2 PPD of cigarettes since the age of 7, and drinks  A 40 oz beer daily. His last drink was about a week ago.  Mr. Jenifer Hunt presented to the ED with complaints of shortness of breath which has been progressively worsening over the past 2 weeks and increased weakness. He denies nausea and vomiting. He does report his appetite has been poor. His last colonoscopy was 2 years ago and he reports they removed 4 polyps. His stool for occult blood is pending. He reports his bowel movements are normal but they have have been dark in color. On admission his hgB noted to at 6.8. He does have one unit infusing at time of exam. His EKG on admission shows Afib. His heart rate is controlled at this time. proBNP elevated at 7.967. Normal LFT's.  Cardiology was consulted for further evaluation. He reports he fell about a month ago but none recently. [employer] for EGD in the am.     Chest x-ray 11/15/2021: INDICATION: Shortness of breath.   Heart size is at the upper limits of normal.. There are bilateral pleural  effusions with bilateral diffuse airspace disease or edema. Multiple right rib  fractures. No pneumothorax. Degenerative changes of the shoulders and spine  IMPRESSION1. Bilateral pleural effusions with bilateral airspace disease or edema. 2. Multiple right-sided rib fractures, probably old, recommend follow-up as warranted.         Objective:     Vitals:    11/18/21 0704 11/18/21 0728 11/18/21 1115 11/18/21 1614   BP: (!) 147/57  (!) 172/79 (!) 165/79   Pulse: 64  63 67   Resp: 20  18 20   Temp: 98.4 °F (36.9 °C)  98.2 °F (36.8 °C) 97.8 °F (36.6 °C)   SpO2: 95% 95% 95% 98%   Weight:       Height:            Intake and Output:  Current Shift: 11/18 0701 - 11/18 1900  In: -   Out: 4306 [WEPHQ:2346]  Last three shifts: 11/16 1901 - 11/18 0700  In: 400 [I.V.:400]  Out: 1800 [Urine:1800]    Physical Exam:   Skin:  Extremities and face reveal no rashes. No gabriel erythema. HEENT: Sclerae anicteric. Extra-occular muscles are intact. No abnormal pigmentation of the lips. The neck is supple. Cardiovascular: Regular rate and rhythm. Respiratory:  Comfortable breathing with no accessory muscle use. GI:  Abdomen nondistended, soft, and nontender. No enlargement of the liver or spleen. No masses palpable. Rectal:  Deferred  Musculoskeletal: Extremities have good range of motion. Neurological:  Gross memory appears intact. Patient is alert and oriented. Psychiatric:  Mood appears appropriate with judgement intact.   Lymphatic:  No visible adenopathy      Lab/Data Review:  Recent Results (from the past 24 hour(s))   NT-PRO BNP    Collection Time: 11/18/21  6:32 AM   Result Value Ref Range    NT pro-BNP 5,079 (H) <450 pg/mL   CBC W/O DIFF    Collection Time: 11/18/21  6:32 AM   Result Value Ref Range    WBC 11.9 (H) 4.1 - 11.1 K/uL    RBC 4.05 (L) 4.10 - 5.70 M/uL    HGB 8.2 (L) 12.1 - 17.0 g/dL    HCT 28.1 (L) 36.6 - 50.3 %    MCV 69.4 (L) 80.0 - 99.0 FL    MCH 20.2 (L) 26.0 - 34.0 PG    MCHC 29.2 (L) 30.0 - 36.5 g/dL    RDW 20.3 (H) 11.5 - 14.5 %    PLATELET 283 180 - 994 K/uL    MPV 10.6 8.9 - 12.9 FL    NRBC 0.4 (H) 0.0  WBC    ABSOLUTE NRBC 0.05 (H) 0.00 - 7.80 K/uL   METABOLIC PANEL, COMPREHENSIVE    Collection Time: 11/18/21  6:32 AM   Result Value Ref Range    Sodium 140 136 - 145 mmol/L    Potassium 4.1 3.5 - 5.1 mmol/L    Chloride 106 97 - 108 mmol/L    CO2 27 21 - 32 mmol/L    Anion gap 7 5 - 15 mmol/L    Glucose 123 (H) 65 - 100 mg/dL    BUN 23 (H) 6 - 20 mg/dL    Creatinine 1.00 0.70 - 1.30 mg/dL    BUN/Creatinine ratio 23 (H) 12 - 20      GFR est AA >60 >60 ml/min/1.73m2    GFR est non-AA >60 >60 ml/min/1.73m2    Calcium 8.3 (L) 8.5 - 10.1 mg/dL    Bilirubin, total 0.6 0.2 - 1.0 mg/dL    AST (SGOT) 23 15 - 37 U/L    ALT (SGPT) 28 12 - 78 U/L    Alk.  phosphatase 62 45 - 117 U/L    Protein, total 5.9 (L) 6.4 - 8.2 g/dL    Albumin 2.9 (L) 3.5 - 5.0 g/dL    Globulin 3.0 2.0 - 4.0 g/dL    A-G Ratio 1.0 (L) 1.1 - 2.2     ECHO ADULT COMPLETE    Collection Time: 11/18/21  9:10 AM   Result Value Ref Range    Aortic Regurgitant Pressure Half-time 540.00 ms    AR Max Dev 305.00 cm/s    Aortic Valve Systolic Peak Velocity 142.61 cm/s    AoV PG 7.00 mmHg    Ao Root D 3.60 cm    IVSd 1.02 (A) 0.6 - 1.0 cm    LVIDd 4.24 4.2 - 5.9 cm    LVIDs 2.89 cm    LVOT Peak Velocity 84.90 cm/s    LVOT Peak Gradient 3.00 mmHg    LVPWd 1.11 (A) 0.6 - 1.0 cm    LV E' Septal Velocity 6.73 cm/s    LV ED Vol A2C 76.20 cm3    LV ES Vol A2C 24.10 cm3    E/E' septal 20.51     Left Atrium Major Axis 5.60 cm    Mitral Valve Max Velocity 124.00 cm/s    MV Peak Gradient 6.00 mmHg    Mitral Valve Deceleration [place] 5,020.00 mm/s2    Mitral Valve Deceleration [place] 5,020.00 mm/s2    Mitral Valve E Wave Deceleration Time 225.00 ms    Mitral Valve Pressure Half-time 71.00 ms    MV A Dev 107.00 cm/s    MV E Dev 138.00 cm/s    MV Mean Gradient 2.00 mmHg    Mitral Valve Annulus Velocity Time Integral 35.70 cm    MVA (PHT) 3.10 cm2    MV E/A 1.29     Pulmonic Regurgitant End Max Velocity 127.00 cm/s    Pulmonic Valve Systolic Peak Instantaneous Gradient 6.00 mmHg    Pulmonic Valve Max Velocity 91.90 cm/s    Pulmonic Valve Systolic Peak Instantaneous Gradient 3.00 mmHg    Est. RA Pressure 15.00 mmHg    RVIDd 4.08 cm    RVSP 62.00 mmHg    Tricuspid Valve Max Velocity 341.00 cm/s    Triscuspid Valve Regurgitation Peak Gradient 47.00 mmHg    Tapse 1.60 1.5 - 2.0 cm    Right Atrial Area 4C 17.26 cm2    LA Area 4C 22.26 cm2    BP EF 60.3 55 - 100 %    LV Mass .2 88 - 224 g    LV Mass AL Index 75.7 49 - 115 g/m2    Left Atrium Minor Axis 2.79 cm              XR CHEST SNGL V   Final Result   1. Bilateral pleural effusions with bilateral airspace disease or edema. 2. Multiple right-sided rib fractures, probably old, recommend follow-up as   warranted. Assessment:     Active Problems:    CHF (congestive heart failure) ([address] (11/15/2021)      PNA (pneumonia) (11/15/2021)        Plan:   1. GI Bleed       S/P 1 Unit PRBC's on 11/16/21      CBC in the am      Transfuse as needed      S/P EGD on 11/17/21 showing gastritis w/o bleeding      Continue PPI      Regular diet      No anticoagulation until seen as out patient     Follow up with GI 2 weeks post discharge. 2. CHFrEF     Elevated pro BNP 5,079      Lasix 40 mg BID      Continue beta blocker       Cardiology input appreciated  3. COPD with Exacerbation      Acute Hypoxic Respiratory Failure      Currently on 2 liters of oxygen       Pulmonologist input appreciated      Continue solumedrol   4. GERD      Continue Protonix 40 mg daily    Thank you for allowing me to participate in this patients care. Plan discussed with Dr. Emma Funez and he approves.     Signed By: Akil Sotomayor NP     November 18, 2021

## 2021-11-19 VITALS
DIASTOLIC BLOOD PRESSURE: 67 MMHG | WEIGHT: 176.59 LBS | TEMPERATURE: 97.3 F | HEIGHT: 72 IN | OXYGEN SATURATION: 94 % | HEART RATE: 65 BPM | SYSTOLIC BLOOD PRESSURE: 130 MMHG | BODY MASS INDEX: 23.92 KG/M2 | RESPIRATION RATE: 20 BRPM

## 2021-11-19 PROCEDURE — 90471 IMMUNIZATION ADMIN: CPT

## 2021-11-19 PROCEDURE — 74011250636 HC RX REV CODE- 250/636: Performed by: NURSE PRACTITIONER

## 2021-11-19 PROCEDURE — 74011000258 HC RX REV CODE- 258: Performed by: FAMILY MEDICINE

## 2021-11-19 PROCEDURE — 74011250637 HC RX REV CODE- 250/637: Performed by: FAMILY MEDICINE

## 2021-11-19 PROCEDURE — 97530 THERAPEUTIC ACTIVITIES: CPT

## 2021-11-19 PROCEDURE — 97165 OT EVAL LOW COMPLEX 30 MIN: CPT

## 2021-11-19 PROCEDURE — 74011250636 HC RX REV CODE- 250/636: Performed by: FAMILY MEDICINE

## 2021-11-19 PROCEDURE — 94640 AIRWAY INHALATION TREATMENT: CPT

## 2021-11-19 PROCEDURE — 94760 N-INVAS EAR/PLS OXIMETRY 1: CPT

## 2021-11-19 PROCEDURE — 74011250636 HC RX REV CODE- 250/636: Performed by: INTERNAL MEDICINE

## 2021-11-19 PROCEDURE — 90686 IIV4 VACC NO PRSV 0.5 ML IM: CPT | Performed by: FAMILY MEDICINE

## 2021-11-19 RX ADMIN — BUDESONIDE AND FORMOTEROL FUMARATE DIHYDRATE 2 PUFF: 160; 4.5 AEROSOL RESPIRATORY (INHALATION) at 07:26

## 2021-11-19 RX ADMIN — FAMOTIDINE 20 MG: 20 TABLET, FILM COATED ORAL at 08:58

## 2021-11-19 RX ADMIN — PIPERACILLIN SODIUM AND TAZOBACTAM SODIUM 3.38 G: 3; .375 INJECTION, POWDER, LYOPHILIZED, FOR SOLUTION INTRAVENOUS at 12:53

## 2021-11-19 RX ADMIN — Medication 10 ML: at 14:55

## 2021-11-19 RX ADMIN — METHYLPREDNISOLONE SODIUM SUCCINATE 40 MG: 40 INJECTION, POWDER, FOR SOLUTION INTRAMUSCULAR; INTRAVENOUS at 05:00

## 2021-11-19 RX ADMIN — PIPERACILLIN SODIUM AND TAZOBACTAM SODIUM 3.38 G: 3; .375 INJECTION, POWDER, LYOPHILIZED, FOR SOLUTION INTRAVENOUS at 05:00

## 2021-11-19 RX ADMIN — METHYLPREDNISOLONE SODIUM SUCCINATE 40 MG: 40 INJECTION, POWDER, FOR SOLUTION INTRAMUSCULAR; INTRAVENOUS at 14:51

## 2021-11-19 RX ADMIN — INFLUENZA VIRUS VACCINE 0.5 ML: 15; 15; 15; 15 SUSPENSION INTRAMUSCULAR at 16:50

## 2021-11-19 RX ADMIN — FUROSEMIDE 40 MG: 40 TABLET ORAL at 08:58

## 2021-11-19 RX ADMIN — PANTOPRAZOLE SODIUM 40 MG: 40 TABLET, DELAYED RELEASE ORAL at 08:58

## 2021-11-19 RX ADMIN — Medication 10 ML: at 05:01

## 2021-11-19 RX ADMIN — SODIUM CHLORIDE 75 ML/HR: 9 INJECTION, SOLUTION INTRAVENOUS at 05:07

## 2021-11-19 NOTE — DISCHARGE SUMMARY
General Daily Progress Note          Patient Name:   Kayla Goldstein       YOB: 1940       Age:  [de-identified] y.o. Admit Date: 11/15/2021      Subjective:     Lorene Alejandro y. o. male with a past medical history significant for coronary disease s/p PCI, dilated cardiomyopathy, afib, alcohol abuse, current smoker 2ppd. On 11/15/2021, presented to the ED with chief complaint of Shortness of Breath and sharp non-exertional non-radiating chest pain. On ED work-up was found to be in afib with elevated troponin and elevated BNP. Concern for NSTEMI. Admitted for further evaluation and treatment.       11/18:  Patient seen and examined. He is lying in bed on room air. Presently has mild abdominal pain that he says is chronic. Otherwise has no complaints. Denies headache, dizziness, chest pain, shortness of breath, nausea, vomiting, changes in bowel/bladder, fevers, or chills. Scheduled for colonoscopy tomorrow    11/19:  Patient feeling well, frustrated and is \"ready to get out of here\". Abdominal pain has much improved. Anemia improved after transfusion, now 8.2. EGD showed gastritis with no active bleeding. Objective:     Visit Vitals  BP (!) 149/80 (BP 1 Location: Left upper arm, BP Patient Position: Semi fowlers)   Pulse 63   Temp 97.5 °F (36.4 °C)   Resp 20   Ht 5' 11.5\" (1.816 m)   Wt 80.1 kg (176 lb 9.4 oz)   SpO2 98%   BMI 24.29 kg/m²        No results found for this or any previous visit (from the past 24 hour(s)). Review of Systems    Constitutional: Negative for chills and fever. HENT: Negative. Eyes: Negative. Respiratory: Negative. Cardiovascular: Negative. Gastrointestinal: Chronic abdominal pain. Negative for nausea. Skin: Negative. Neurological: Negative. Physical Exam:      Constitutional: Elderly male, sitting up in bed, in no acute distress. HENT:   Head: Normocephalic and atraumatic. Eyes: Pupils are equal, round, and reactive to light.  EOM are normal.   Cardiovascular: Normal rate, regular rhythm and normal heart sounds. Pulmonary/Chest: Breath sounds normal. No wheezes. No rales. Exhibits no tenderness. Abdominal: Soft. Bowel sounds are normal. There is no abdominal tenderness. There is no rebound and no guarding. Musculoskeletal: Normal range of motion. Neurological: pt is alert and oriented to person, place, and time. XR CHEST SNGL V   Final Result   1. Bilateral pleural effusions with bilateral airspace disease or edema. 2. Multiple right-sided rib fractures, probably old, recommend follow-up as   warranted. No results found for this or any previous visit (from the past 24 hour(s)). Results     Procedure Component Value Units Date/Time    COVID-19 RAPID TEST [[id number]] Collected: 11/15/21 2100    Order Status: Completed Specimen: Nasopharyngeal Updated: 11/15/21 2148     Specimen source       Please find results under separate order           COVID-19 rapid test Not Detected        Comment: Rapid Abbott ID Now   Rapid NAAT:  The specimen is NEGATIVE for SARS-CoV-2, the novel coronavirus associated with COVID-19. Negative results should be treated as presumptive and, if inconsistent with clinical signs and symptoms or necessary for patient management, should be tested with an alternative molecular assay. Negative results do not preclude SARS-CoV-2 infection and should not be used as the sole basis for patient management decisions. This test has been authorized by the FDA under   an Emergency Use Authorization (EUA) for use by authorized laboratories.  Fact sheet for Healthcare Providers: ConventionUpdate.co.nz Fact sheet for Patients: ConventionUpdate.co.nz   Methodology: Isothermal Nucleic Acid Amplification         CULTURE, BLOOD #1 [[id number]] Collected: 11/15/21 1245    Order Status: Canceled Specimen: Blood     CULTURE, BLOOD #2 [[id number]] Collected: 11/15/21 1245    Order Status: Canceled Specimen: Blood     CULTURE, BLOOD, PAIRED [[id number]] Collected: 11/15/21 1200    Order Status: Completed Specimen: Blood Updated: 11/19/21 0730     Special Requests: No Special Requests        Culture result:       One of four bottles has been flagged positive by instrument. Bottle has been sent to [facility] laboratory to assess for possible growth. Gram Positive Cocci in clusters CALLED TO AND READ BACK BY Jody Fleming R.N. 08Adrian 11/18/2021 BY JOSIAH                  Staphylococcus species, coagulase negative GROWING IN THE ANAEROBIC BOTTLE NO SITE INDICATED          CULTURE, BLOOD [[id number]] Collected: 11/15/21 1045    Order Status: Canceled Specimen: Blood            Labs:     Recent Labs     11/18/21  0632 11/17/21  0420   WBC 11.9* 14.7*   HGB 8.2* 8.2*   HCT 28.1* 28.0*    391     Recent Labs     11/18/21 0632 11/17/21  0420    136   K 4.1 3.8    104   CO2 27 25   BUN 23* 30*   CREA 1.00 1.38*   * 138*   CA 8.3* 8.8     Recent Labs     11/18/21  0632 11/17/21  0420   ALT 28 14   AP 62 68   TBILI 0.6 0.5   TP 5.9* 6.5   ALB 2.9* 2.8*   GLOB 3.0 3.7     No results for input(s): INR, PTP, APTT, INREXT, INREXT in the last 72 hours. No results for input(s): FE, TIBC, PSAT, FERR in the last 72 hours. No results found for: FOL, RBCF   No results for input(s): PH, PCO2, PO2 in the last 72 hours. No results for input(s): CPK, CKNDX, TROIQ in the last 72 hours.     No lab exists for component: CPKMB  No results found for: CHOL, CHOLX, CHLST, CHOLV, HDL, HDLP, LDL, LDLC, DLDLP, TGLX, TRIGL, TRIGP, CHHD, CHHDX  No results found for: [facility]  Lab Results   Component Value Date/Time    Color Yellow/Straw 11/15/2021 09:35 AM    Appearance Clear 11/15/2021 09:35 AM    Specific gravity 1.016 11/15/2021 09:35 AM    pH (UA) 5.0 11/15/2021 09:35 AM    Protein 30 (A) 11/15/2021 09:35 AM    Glucose Negative 11/15/2021 09:35 AM    Ketone Negative 11/15/2021 09:35 AM    Bilirubin Negative 11/15/2021 09:35 AM    Urobilinogen 2.0 (H) 11/15/2021 09:35 AM    Nitrites Negative 11/15/2021 09:35 AM    Leukocyte Esterase Negative 11/15/2021 09:35 AM    Bacteria Negative 11/15/2021 09:35 AM    WBC 0-4 11/15/2021 09:35 AM    RBC 0-5 11/15/2021 09:35 AM         Assessment:     Anemia d/t GI bleed  · 11/17 EGD showed chronic gastritis without acute bleed  ·     Congestive heart failure with reduced EF  · CXR:  Bilateral pleural effusions with bilateral airspace disease or edema. Multiple right-sided rib fractures, probably old, recommend follow-up as warranted. Acute hypoxic respiratory failure    COPD with severe acute exacerbation    Atrial Fibrillation with RVR, now bradycardia  · Metoprolol previously held due to bradycardia,aspirin 81mg daily. · Cardiology consult pending    Congestive heart failure r/o cor pulmonale    Prerenal azotemia    Bacteremia, gram positive cocci  · One of four bottles. Likely due to contamination. GERD  -Continue Protonix, Pepcid    Hypertension  -BP today 178/84  -Currently on no BP medications  -Consider restarting metoprolol     Hyperlipidemia     Alcohol Abuse     Nicotine dependence  Declined smoking cessation counseling and nicotine patch     Frequent falls      Consults:  PT: Recommend discharge to SNF    RT: SpO2 on room air: 95% at rest, 96% on ambulation    GI: Continue Protonix, regular diet, f/u with GI in 2 weeks post discharge    Pulm: CXR shows mostly likely CHF, patient on room air. Recommends continuing with current treatment plan.       Discharge: to [address]    Has a hemoglobin stable no active bleeding patient breathing well denies any chest pain shortness of breath nausea vomiting today if cleared by the cardiology and the GI patient can be discharged home

## 2021-11-19 NOTE — PROGRESS NOTES
Administered patient's flu shot; discovered that patient's personal clothes were wet and soiled. Placed patient belongings in bag and dressed patient in clean socks, underwear, and two gowns. Gathered discharge paperwork in yellow folder; awaiting transport to [facility].

## 2021-11-19 NOTE — DISCHARGE INSTRUCTIONS
Discharge Instructions       PATIENT ID: Feliz Ulloa  MRN: [id number]   YOB: 1940    DATE OF ADMISSION: 11/15/2021  9:18 AM    DATE OF DISCHARGE: 11/18/2021    PRIMARY CARE PROVIDER: Lisa Gonzalez MD     ATTENDING PHYSICIAN: Asiya Mcrae MD  DISCHARGING PROVIDER: Veena Krishna MD    To contact this individual call [phone] and ask the  to page. If unavailable ask to be transferred the Adult Hospitalist Department. DISCHARGE DIAGNOSES CHF anemia    CONSULTATIONS: IP CONSULT TO PULMONOLOGY  IP CONSULT TO CARDIOLOGY  IP CONSULT TO GASTROENTEROLOGY    PROCEDURES/SURGERIES: Procedure(s):  ESOPHAGOGASTRODUODENOSCOPY (EGD)    PENDING TEST RESULTS:   At the time of discharge the following test results are still pending: Need colonoscopy as an outpatient    FOLLOW UP APPOINTMENTS:   Follow-up Information     Follow up With Specialties Details Why Contact Info    Bere Tamayo  [facility] Vascular Surgery, Interventional Cardiology, Cardiology In 2 weeks  [place] [address]  [phone]      Lisa Gonzalez MD Family Medicine In 1 week  [address] [place] [id number] [phone]             ADDITIONAL CARE RECOMMENDATIONS: Colonoscopy as an outpatient monitor blood work including CBC CMP BNP    DIET: Cardiac Diet      ACTIVITY: {discharge activity:26124}    Wound care: {[facility]ound [facility] Instructions:26149}    EQUIPMENT needed: ***      DISCHARGE MEDICATIONS:   See Medication Reconciliation Form    · It is important that you take the medication exactly as they are prescribed. · Keep your medication in the bottles provided by the pharmacist and keep a list of the medication names, dosages, and times to be taken in your wallet. · Do not take other medications without consulting your doctor. NOTIFY YOUR PHYSICIAN FOR ANY OF THE FOLLOWING:   Fever over 101 degrees for 24 hours.    Chest pain, shortness of breath, fever, chills, nausea, vomiting, diarrhea, change in mentation, falling, weakness, bleeding. Severe pain or pain not relieved by medications. Or, any other signs or symptoms that you may have questions about. DISPOSITION:    Home With:   OT  PT  HH  RN       SNF/Inpatient Rehab/LTAC    Independent/assisted living    Hospice    Other:         PROBLEM LIST Updated:  Yes ***       Signed:   Cindy Samson MD  11/18/2021  11:10 AM     Discharge Instructions       PATIENT ID: Merrill Nguyen  MRN: [id number]   YOB: 1940    DATE OF ADMISSION: 11/15/2021  9:18 AM    DATE OF DISCHARGE: 11/18/2021    PRIMARY CARE PROVIDER: Linus Whitlock MD     ATTENDING PHYSICIAN: Miguelina Guevara MD  DISCHARGING PROVIDER: Cindy Samson MD    To contact this individual call [phone] and ask the  to page. If unavailable ask to be transferred the Adult Hospitalist Department. DISCHARGE DIAGNOSES ***    CONSULTATIONS: IP CONSULT TO PULMONOLOGY  IP CONSULT TO CARDIOLOGY  IP CONSULT TO GASTROENTEROLOGY    PROCEDURES/SURGERIES: Procedure(s):  ESOPHAGOGASTRODUODENOSCOPY (EGD)    PENDING TEST RESULTS:   At the time of discharge the following test results are still pending: ***    FOLLOW UP APPOINTMENTS:   Follow-up Information     Follow up With Specialties Details Why Contact Info    Gaviota Tamayo MD Cardio Vascular Surgery, Interventional Cardiology, Cardiology In 2 weeks  [address]      Linus Whitlock MD Family Medicine In 1 week  [address] [place], [place] [id number] [phone]             ADDITIONAL CARE RECOMMENDATIONS: ***    DIET: {diet:97091}      ACTIVITY: {discharge activity:64166}    Wound care: {[facility] Wound Care Instructions:91013}    EQUIPMENT needed: ***      DISCHARGE MEDICATIONS:   See Medication Reconciliation Form    · It is important that you take the medication exactly as they are prescribed.    · Keep your medication in the bottles provided by the pharmacist and keep a list of the medication names, dosages, and times to be taken in your wallet. · Do not take other medications without consulting your doctor. NOTIFY YOUR PHYSICIAN FOR ANY OF THE FOLLOWING:   Fever over 101 degrees for 24 hours. Chest pain, shortness of breath, fever, chills, nausea, vomiting, diarrhea, change in mentation, falling, weakness, bleeding. Severe pain or pain not relieved by medications. Or, any other signs or symptoms that you may have questions about.       DISPOSITION:    Home With:   OT  PT  HH  RN       SNF/Inpatient Rehab/LTAC    Independent/assisted living    Hospice    Other:         PROBLEM LIST Updated:  Yes ***       Signed:   Angela Red MD  11/18/2021  11:10 AM

## 2021-11-19 NOTE — PROGRESS NOTES
OCCUPATIONAL THERAPY EVALUATION  Patient: Jn Gregorio (89 y.o. male)  Date: 11/19/2021  Primary Diagnosis: CHF (congestive heart failure) (HCC) [I50.9]  PNA (pneumonia) [J18.9]  Procedure(s) (LRB):  ESOPHAGOGASTRODUODENOSCOPY (EGD) (N/A) 2 Days Post-Op   Precautions: fall risk       ASSESSMENT  Pt is a [de-identified] y/o M with PMH of coronary disease s/p PCI, dilated cardiomyopathy, atrial fibrillation, alcohol abuse, and current smoker 2ppd, presenting to [facility] with c/o SOB and sharp non-exertional non-radiating chest pain, admitted 11/15/21  and being treated for CHF, PNA. Pt is s/p EGD 11/17 showing gastritis without bleeding. Pt received semi-supine in bed upon arrival, AXO to self and place and agreeable to OT evaluation at this time. Per pt report, pt resides in [place], was IND with ADLs and ambulatory without DME at [facility]. Based on current observations, pt presents with deficits in generalized strength/AROM, dynamic sitting balance, static/dynamic standing balance, functional activity tolerance, cognition/confusion, and poor safety awareness impacting overall performance of ADLs and functional transfers/mobility. Pt currently requires supervision with bed mobility and CGA-supervision donning socks seated  EOB crossing LE over opposite knee. STS completed to/from EOB and toilet with CGA, RW and gait belt used for balance/safety with cues required for safety as pt noted to be impulsive with OOB mobility. Pt completed basic grooming at sinkside (oral care, washing face/hands, combing hair) s/p setup of items requiring CGA standing for approx 1 minute before requesting to sit on BSC to complete routine 2' to fatigue. Pt returned to bedside SBA at end of session, left resting comfortably in long sitting and noted to be IND with meal. Overall, pt tolerates session fair.  Pt would benefit from continued skilled OT services to address current impairments and improve IND and safety with self cares and functional transfers/mobility. Recommend discharge to SNF once medically appropriate. Other factors to consider for discharge: family support, DME, time since onset, severity of deficits      Patient will benefit from skilled therapy intervention to address the above noted impairments. PLAN :  Recommendations and Planned Interventions: self care training, functional mobility training, therapeutic exercise, balance training, therapeutic activities, cognitive retraining, endurance activities, neuromuscular re-education and patient education    Frequency/Duration: Patient will be followed by occupational therapy:  2-3x/week to address goals. Recommendation for discharge: (in order for the patient to meet his/her long term goals)  Antoine Prado    This discharge recommendation:  Has been made in collaboration with the attending provider and/or case management       SUBJECTIVE:   Patient stated I can do it myself.  while attempting to stand while pulling up on RW    OBJECTIVE DATA SUMMARY:   HISTORY:   Past Medical History:   Diagnosis Date    GERD (gastroesophageal reflux disease)     Hypertension    History reviewed. No pertinent surgical history. Expanded or extensive additional review of patient history:     Home Situation  Home Environment: [address] [place] [address] Road Name: [address] [place] [address]  One/Two Story Residence: One story  Living Alone: No  Support Systems: Assisted Living  Patient Expects to be Discharged to[de-identified] Assisted living  Current DME Used/Available at Home: Grab bars, Shower chair, Walker, rolling  Tub or Shower Type: Shower    EXAMINATION OF PERFORMANCE DEFICITS:  Cognitive/Behavioral Status:  Neurologic State: Alert; Confused  Orientation Level: Oriented to place; Oriented to person; Disoriented to time  Cognition: Follows commands; Impulsive        Safety/Judgement: Decreased awareness of need for safety    Hearing:   Auditory  Auditory Impairment: Hard of hearing, bilateral    Range of Motion:  AROM: Generally decreased, functional    Strength:  Strength: Generally decreased, functional    Coordination:     Fine Motor Skills-Upper: Left Intact; Right Intact    Gross Motor Skills-Upper: Left Intact; Right Intact    Tone & Sensation:  Tone: Normal    Balance:  Sitting: Impaired; Without support  Sitting - Static: Good (unsupported)  Sitting - Dynamic: Fair (occasional)  Standing: Impaired; With support  Standing - Static: Fair; Constant support  Standing - Dynamic : Fair; Constant support    Functional Mobility and Transfers for ADLs:  Bed Mobility:  Rolling: Supervision  Supine to Sit: Supervision  Sit to Supine: Supervision  Scooting: Supervision    Transfers:  Sit to Stand: Contact guard assistance  Stand to Sit: Contact guard assistance  Bathroom Mobility: Contact guard assistance (with rest breaks)  Toilet Transfer : Adaptive equipment; Contact guard assistance (Use of grab bar)    ADL Intervention and task modifications:  Grooming  Position Performed: Other (comment); Seated in chair (Seated on commode at sink)  Washing Face: Set-up  Washing Hands: Set-up  Brushing Teeth: Set-up  Brushing/Combing Hair: Set-up    Lower Body Dressing Assistance  Socks: Set-up; Supervision  Leg Crossed Method Used: Yes  Position Performed: Seated edge of bed    Toileting  Toileting Assistance: Supervision  Bowel Hygiene: Set-up; Supervision  Adaptive Equipment: Grab bars; Walker    Cognitive Retraining  Safety/Judgement: Decreased awareness of need for safety    Therapeutic Exercise:  Pt would benefit from UE HEP to improve overall UE AROM/strength and can be further educated in next treatment session. Functional Measure:    [address] [facility] [address] [address] AM-PACTM \"6 Clicks\"                                                       Daily Activity Inpatient Short Form  How much help from another person does the patient currently need. .. Total; A Lot A Little None   1.   Putting on and taking off regular lower body clothing? []  1 []  2 [x]  3 []  4   2. Bathing (including washing, rinsing, drying)? []  1 []  2 [x]  3 []  4   3. Toileting, which includes using toilet, bedpan or urinal? [] 1 []  2 [x]  3 []  4   4. Putting on and taking off regular upper body clothing? []  1 []  2 [x]  3 []  4   5. Taking care of personal grooming such as brushing teeth? []  1 []  2 [x]  3 []  4   6. Eating meals? []  1 []  2 []  3 [x]  4   © , [address] [address], under license to [employer]. All rights reserved     Score:      Interpretation of Tool:  Represents clinically-significant functional categories (i.e. Activities of daily living). Percentage of Impairment CH    0%   CI    1-19% CJ    20-39% CK    40-59% CL    60-79% CM    80-99% CN     100%   [facility]  Score 6-24 24 23 20-22 15-19 10-14 7-9 6         Occupational Therapy Evaluation Charge Determination   History Examination Decision-Making   LOW Complexity : Brief history review  LOW Complexity : 1-3 performance deficits relating to physical, cognitive , or psychosocial skils that result in activity limitations and / or participation restrictions  MEDIUM Complexity : Patient may present with comorbidities that affect occupational performnce. Miniml to moderate modification of tasks or assistance (eg, physical or verbal ) with assesment(s) is necessary to enable patient to complete evaluation       Based on the above components, the patient evaluation is determined to be of the following complexity level: LOW   Pain Ratin/10 chest    Activity Tolerance:   Fair and requires rest breaks    After treatment patient left in no apparent distress:    Supine in bed, Call bell within reach, Bed / chair alarm activated and Side rails x 3    COMMUNICATION/EDUCATION:   The patients plan of care was discussed with: Physical therapist and Registered nurse. Patient/family have participated as able in goal setting and plan of care.  and Patient/family agree to work toward stated goals and plan of care. This patients plan of care is appropriate for delegation to [facility]. Thank you for this referral.  Brisa Lewis  Time Calculation: 41 mins    Problem: Self Care Deficits Care Plan (Adult)  Goal: *Acute Goals and Plan of Care (Insert Text)  Description:   1. Pt will be IND sup <> sit in prep for EOB ADLs  2. Pt will be IND grooming standing sinkside with LRAD  3. Pt will be IND LE dressing sitting EOB/long sit  4. Pt will be IND sit <>  prep for toileting LRAD  5. Pt will be IND toileting/toilet transfer/cloth mgmt LRAD  6.  Pt will be IND following UE HEP in prep for self care tasks     Outcome: Not Met

## 2021-11-19 NOTE — PROGRESS NOTES
[facility] accepted patient with bed available today. Clinicals and discharge order sent to [facility] via MARGARET. RN notified to call report to [phone] and set up transport for 3p. SANDEEP telephoned [phone] to update on above. Ms. eRnetta Poole stated she would pack the patient's suitcase for [facility] and if patient does not qualify for transport to facility she will take him. Discharge plan of care/case management plan validated with provider discharge order. 1345: SANDEEP telephoned [address] [facility] to inform her patient will need transport to [facility]. She is able to pick him up around 5p today. RN updated.

## 2021-11-19 NOTE — PROGRESS NOTES
General Daily Progress Note          Patient Name:   Shira Pollock       YOB: 1940       Age:  [de-identified] y.o. Admit Date: 11/15/2021      Subjective:     Patricia Levo y. o. male with a past medical history significant for coronary disease s/p PCI, dilated cardiomyopathy, afib, alcohol abuse, current smoker 2ppd. On 11/15/2021, presented to the ED with chief complaint of Shortness of Breath and sharp non-exertional non-radiating chest pain. On ED work-up was found to be in afib with elevated troponin and elevated BNP. Concern for NSTEMI. Admitted for further evaluation and treatment.       11/18:  Patient seen and examined. He is lying in bed on room air. Presently has mild abdominal pain that he says is chronic. Otherwise has no complaints. Denies headache, dizziness, chest pain, shortness of breath, nausea, vomiting, changes in bowel/bladder, fevers, or chills. Scheduled for colonoscopy tomorrow    11/19:  Patient feeling well, frustrated and is \"ready to get out of here\". Abdominal pain has much improved. Anemia improved after transfusion, now 8.2. EGD showed gastritis with no active bleeding.      Objective:     Visit Vitals  BP (!) 178/84 (BP 1 Location: Left upper arm, BP Patient Position: At rest)   Pulse 96   Temp 97.6 °F (36.4 °C)   Resp 19   Ht 5' 11.5\" (1.816 m)   Wt 176 lb 9.4 oz (80.1 kg)   SpO2 96%   BMI 24.29 kg/m²        Recent Results (from the past 24 hour(s))   ECHO ADULT COMPLETE    Collection Time: 11/18/21  9:10 AM   Result Value Ref Range    Aortic Regurgitant Pressure Half-time 540.00 ms    AR Max Dev 305.00 cm/s    Aortic Valve Systolic Peak Velocity 378.88 cm/s    AoV PG 7.00 mmHg    Ao Root D 3.60 cm    IVSd 1.02 (A) 0.6 - 1.0 cm    LVIDd 4.24 4.2 - 5.9 cm    LVIDs 2.89 cm    LVOT Peak Velocity 84.90 cm/s    LVOT Peak Gradient 3.00 mmHg    LVPWd 1.11 (A) 0.6 - 1.0 cm    LV E' Septal Velocity 6.73 cm/s    LV ED Vol A2C 76.20 cm3    LV ES Vol A2C 24.10 cm3    E/E' septal 20.51     Left Atrium Major Axis 5.60 cm    Mitral Valve Max Velocity 124.00 cm/s    MV Peak Gradient 6.00 mmHg    Mitral Valve Deceleration [place] 5,020.00 mm/s2    Mitral Valve Deceleration [place] 5,020.00 mm/s2    Mitral Valve E Wave Deceleration Time 225.00 ms    Mitral Valve Pressure Half-time 71.00 ms    MV A Dev 107.00 cm/s    MV E Dev 138.00 cm/s    MV Mean Gradient 2.00 mmHg    Mitral Valve Annulus Velocity Time Integral 35.70 cm    MVA (PHT) 3.10 cm2    MV E/A 1.29     Pulmonic Regurgitant End Max Velocity 127.00 cm/s    Pulmonic Valve Systolic Peak Instantaneous Gradient 6.00 mmHg    Pulmonic Valve Max Velocity 91.90 cm/s    Pulmonic Valve Systolic Peak Instantaneous Gradient 3.00 mmHg    Est. RA Pressure 15.00 mmHg    RVIDd 4.08 cm    RVSP 62.00 mmHg    Tricuspid Valve Max Velocity 341.00 cm/s    Triscuspid Valve Regurgitation Peak Gradient 47.00 mmHg    Tapse 1.60 1.5 - 2.0 cm    Right Atrial Area 4C 17.26 cm2    LA Area 4C 22.26 cm2    BP EF 60.3 55 - 100 %    LV Mass .2 88 - 224 g    LV Mass AL Index 75.7 49 - 115 g/m2    Left Atrium Minor Axis 2.79 cm           Review of Systems    Constitutional: Negative for chills and fever. HENT: Negative. Eyes: Negative. Respiratory: Negative. Cardiovascular: Negative. Gastrointestinal: Chronic abdominal pain. Negative for nausea. Skin: Negative. Neurological: Negative. Physical Exam:      Constitutional: Elderly male, sitting up in bed, in no acute distress. HENT:   Head: Normocephalic and atraumatic. Eyes: Pupils are equal, round, and reactive to light. EOM are normal.   Cardiovascular: Normal rate, regular rhythm and normal heart sounds. Pulmonary/Chest: Breath sounds normal. No wheezes. No rales. Exhibits no tenderness. Abdominal: Soft. Bowel sounds are normal. There is no abdominal tenderness. There is no rebound and no guarding. Musculoskeletal: Normal range of motion.    Neurological: pt is alert and oriented to person, place, and time. XR CHEST SNGL V   Final Result   1. Bilateral pleural effusions with bilateral airspace disease or edema. 2. Multiple right-sided rib fractures, probably old, recommend follow-up as   warranted.            Recent Results (from the past 24 hour(s))   ECHO ADULT COMPLETE    Collection Time: 11/18/21  9:10 AM   Result Value Ref Range    Aortic Regurgitant Pressure Half-time 540.00 ms    AR Max Dev 305.00 cm/s    Aortic Valve Systolic Peak Velocity 466.25 cm/s    AoV PG 7.00 mmHg    Ao Root D 3.60 cm    IVSd 1.02 (A) 0.6 - 1.0 cm    LVIDd 4.24 4.2 - 5.9 cm    LVIDs 2.89 cm    LVOT Peak Velocity 84.90 cm/s    LVOT Peak Gradient 3.00 mmHg    LVPWd 1.11 (A) 0.6 - 1.0 cm    LV E' Septal Velocity 6.73 cm/s    LV ED Vol A2C 76.20 cm3    LV ES Vol A2C 24.10 cm3    E/E' septal 20.51     Left Atrium Major Axis 5.60 cm    Mitral Valve Max Velocity 124.00 cm/s    MV Peak Gradient 6.00 mmHg    Mitral Valve Deceleration [place] 5,020.00 mm/s2    Mitral Valve Deceleration [place] 5,020.00 mm/s2    Mitral Valve E Wave Deceleration Time 225.00 ms    Mitral Valve Pressure Half-time 71.00 ms    MV A Dev 107.00 cm/s    MV E Dev 138.00 cm/s    MV Mean Gradient 2.00 mmHg    Mitral Valve Annulus Velocity Time Integral 35.70 cm    MVA (PHT) 3.10 cm2    MV E/A 1.29     Pulmonic Regurgitant End Max Velocity 127.00 cm/s    Pulmonic Valve Systolic Peak Instantaneous Gradient 6.00 mmHg    Pulmonic Valve Max Velocity 91.90 cm/s    Pulmonic Valve Systolic Peak Instantaneous Gradient 3.00 mmHg    Est. RA Pressure 15.00 mmHg    RVIDd 4.08 cm    RVSP 62.00 mmHg    Tricuspid Valve Max Velocity 341.00 cm/s    Triscuspid Valve Regurgitation Peak Gradient 47.00 mmHg    Tapse 1.60 1.5 - 2.0 cm    Right Atrial Area 4C 17.26 cm2    LA Area 4C 22.26 cm2    BP EF 60.3 55 - 100 %    LV Mass .2 88 - 224 g    LV Mass AL Index 75.7 49 - 115 g/m2    Left Atrium Minor Axis 2.79 cm       Results     Procedure Component Value Units Date/Time    COVID-19 RAPID TEST [[id number]] Collected: 11/15/21 2100    Order Status: Completed Specimen: Nasopharyngeal Updated: 11/15/21 2148     Specimen source       Please find results under separate order           COVID-19 rapid test Not Detected        Comment: Rapid Abbott ID Now   Rapid NAAT:  The specimen is NEGATIVE for SARS-CoV-2, the novel coronavirus associated with COVID-19. Negative results should be treated as presumptive and, if inconsistent with clinical signs and symptoms or necessary for patient management, should be tested with an alternative molecular assay. Negative results do not preclude SARS-CoV-2 infection and should not be used as the sole basis for patient management decisions. This test has been authorized by the FDA under   an Emergency Use Authorization (EUA) for use by authorized laboratories. Fact sheet for Healthcare Providers: ConventionUpdate.co.nz Fact sheet for Patients: ConventionUpdate.co.nz   Methodology: Isothermal Nucleic Acid Amplification         CULTURE, BLOOD #1 [[id number]] Collected: 11/15/21 1245    Order Status: Canceled Specimen: Blood     CULTURE, BLOOD #2 [[id number]] Collected: 11/15/21 1245    Order Status: Canceled Specimen: Blood     CULTURE, BLOOD, PAIRED [[id number]] Collected: 11/15/21 1200    Order Status: Completed Specimen: Blood Updated: 11/19/21 0730     Special Requests: No Special Requests        Culture result:       One of four bottles has been flagged positive by instrument. Bottle has been sent to [facility] laboratory to assess for possible growth.  Gram Positive Cocci in clusters CALLED TO AND READ BACK BY Amy Mercedes R.N. 0800 11/18/2021 BY JOSIAH                  Staphylococcus species, coagulase negative GROWING IN THE ANAEROBIC BOTTLE NO SITE INDICATED          CULTURE, BLOOD [[id number]] Collected: 11/15/21 1045    Order Status: Canceled Specimen: Blood            Labs:     Recent Labs     11/18/21  9816 11/17/21 0420   WBC 11.9* 14.7*   HGB 8.2* 8.2*   HCT 28.1* 28.0*    391     Recent Labs     11/18/21  0632 11/17/21 0420    136   K 4.1 3.8    104   CO2 27 25   BUN 23* 30*   CREA 1.00 1.38*   * 138*   CA 8.3* 8.8     Recent Labs     11/18/21  0632 11/17/21 0420   ALT 28 14   AP 62 68   TBILI 0.6 0.5   TP 5.9* 6.5   ALB 2.9* 2.8*   GLOB 3.0 3.7     No results for input(s): INR, PTP, APTT, INREXT, INREXT in the last 72 hours. No results for input(s): FE, TIBC, PSAT, FERR in the last 72 hours. No results found for: FOL, RBCF   No results for input(s): PH, PCO2, PO2 in the last 72 hours. No results for input(s): CPK, CKNDX, TROIQ in the last 72 hours. No lab exists for component: CPKMB  No results found for: CHOL, CHOLX, CHLST, CHOLV, HDL, HDLP, LDL, LDLC, DLDLP, TGLX, TRIGL, TRIGP, CHHD, CHHDX  No results found for: [facility]  Lab Results   Component Value Date/Time    Color Yellow/Straw 11/15/2021 09:35 AM    Appearance Clear 11/15/2021 09:35 AM    Specific gravity 1.016 11/15/2021 09:35 AM    pH (UA) 5.0 11/15/2021 09:35 AM    Protein 30 (A) 11/15/2021 09:35 AM    Glucose Negative 11/15/2021 09:35 AM    Ketone Negative 11/15/2021 09:35 AM    Bilirubin Negative 11/15/2021 09:35 AM    Urobilinogen 2.0 (H) 11/15/2021 09:35 AM    Nitrites Negative 11/15/2021 09:35 AM    Leukocyte Esterase Negative 11/15/2021 09:35 AM    Bacteria Negative 11/15/2021 09:35 AM    WBC 0-4 11/15/2021 09:35 AM    RBC 0-5 11/15/2021 09:35 AM         Assessment:     Anemia d/t GI bleed  · 11/17 EGD showed chronic gastritis without acute bleed  · Scheduled for colonoscopy today    Congestive heart failure with reduced EF  · CXR:  Bilateral pleural effusions with bilateral airspace disease or edema. Multiple right-sided rib fractures, probably old, recommend follow-up as warranted.     Acute hypoxic respiratory failure    COPD with severe acute exacerbation    Atrial Fibrillation with RVR, now bradycardia  · Metoprolol previously held due to bradycardia, HR today 96, consider restarting  · aspirin 81mg daily. · Cardiology consult pending    Congestive heart failure r/o cor pulmonale    Prerenal azotemia    Bacteremia, gram positive cocci  · One of four bottles. Likely due to contamination. GERD  -Continue Protonix, Pepcid    Hypertension  -BP today 178/84  -Currently on no BP medications  -Consider restarting metoprolol     Hyperlipidemia     Alcohol Abuse     Nicotine dependence  Declined smoking cessation counseling and nicotine patch     Frequent falls      Consults:  PT: Recommend discharge to SNF    RT: SpO2 on room air: 95% at rest, 96% on ambulation    GI: Continue Protonix, regular diet, f/u with GI in 2 weeks post discharge    Pulm: CXR shows mostly likely CHF, patient on room air. Recommends continuing with current treatment plan.       Discharge: to [address]    Has a hemoglobin stable no active bleeding patient breathing well denies any chest pain shortness of breath nausea vomiting today if cleared by the cardiology and the GI patient can be discharged home        Current Facility-Administered Medications:     0.9% sodium chloride infusion, 75 mL/hr, IntraVENous, CONTINUOUS, Claude Decant F, NP, Last Rate: 75 mL/hr at 11/19/21 0507, 75 mL/hr at 11/19/21 0507    sodium chloride (NS) flush 5-40 mL, 5-40 mL, IntraVENous, Q8H, Sharon Jimenez, NP, 10 mL at 11/19/21 0501    sodium chloride (NS) flush 5-40 mL, 5-40 mL, IntraVENous, PRN, Theta ALEXIA Rojas    furosemide (LASIX) tablet 40 mg, 40 mg, Oral, DAILY, Wendy Apodaca MD, 40 mg at 11/19/21 0858    budesonide-formoteroL (SYMBICORT) 160-4.5 mcg/actuation HFA inhaler 2 Puff, 2 Puff, Inhalation, BID RT, Wendy Apodaca MD, 2 Puff at 11/19/21 0726    0.9% sodium chloride infusion 250 mL, 250 mL, IntraVENous, PRN, Jose Santana MD    methylPREDNISolone (PF) (SOLU-MEDROL) injection 40 mg, 40 mg, IntraVENous, Q8H, Jose Santana MD, 40 mg at 11/19/21 0500    pantoprazole (PROTONIX) tablet 40 mg, 40 mg, Oral, DAILY, Wendy Apodaca MD, 40 mg at 11/19/21 0858    0.9% sodium chloride infusion 250 mL, 250 mL, IntraVENous, PRN, Lorrie Apodaca MD    famotidine (PEPCID) tablet 20 mg, 20 mg, Oral, BID, Lorrie Apodaca MD, 20 mg at 11/19/21 0858    traMADoL (ULTRAM) tablet 50 mg, 50 mg, Oral, Q6H PRN, Wendy Apodaca MD, 50 mg at 11/18/21 2034    glucagon (GLUCAGEN) injection 1 mg, 1 mg, IntraVENous, PRN, Brent Jimenez MD    acetaminophen (TYLENOL) tablet 650 mg, 650 mg, Oral, Q6H PRN **OR** acetaminophen (TYLENOL) suppository 650 mg, 650 mg, Rectal, Q6H PRN, Lorrie Apodaca MD    polyethylene glycol (MIRALAX) packet 17 g, 17 g, Oral, DAILY PRN, Lorrie Apodaca MD    ondansetron (ZOFRAN ODT) tablet 4 mg, 4 mg, Oral, Q8H PRN **OR** ondansetron (ZOFRAN) injection 4 mg, 4 mg, IntraVENous, Q6H PRN, Wendy Apodaca MD    piperacillin-tazobactam (ZOSYN) 3.375 g in 0.9% sodium chloride (MBP/ADV) 100 mL MBP, 3.375 g, IntraVENous, Q8H, Wendy Apodaca MD, Last Rate: 200 mL/hr at 11/19/21 0500, 3.375 g at 11/19/21 0500    [Held by provider] aspirin chewable tablet 81 mg, 81 mg, Oral, DAILY, Saniya Tamayo MD, 81 mg at 11/16/21 0810    albuterol (PROVENTIL HFA, VENTOLIN HFA, PROAIR HFA) inhaler 2 Puff, 2 Puff, Inhalation, Q4H PRN, Jose Santana MD    influenza vaccine 2021-22 (6 mos+)(PF) (FLUARIX/FLULAVAL/FLUZONE QUAD) injection 0.5 mL, 1 Each, IntraMUSCular, PRIOR TO DISCHARGE, Wendy Apodaca MD    traZODone (DESYREL) tablet 25 mg, 25 mg, Oral, QHS, Saniya Tamayo MD, 25 mg at 11/18/21 2034

## 2021-11-19 NOTE — PROGRESS NOTES
Progress Note    Patient: Irene Rendon MRN: [id number]  SSN: [id number]    YOB: 1940  Age: [de-identified] y.o. Sex: male      Admit Date: 11/15/2021    LOS: 4 days     Subjective:   GI in consultation for GI bleed    Patient seen resting comfortably. He reports he is going to rehab possibly today. EGD on 11/17/2021 which shows gastritis without bleeding. Recommend continuation with PPI. He did receive on unit of PRBC's on 11/16/21. His HgB is stable at 8.2 yesterday. No new labs this morning. He declined colonoscopy this admission. History of Present Illness: Jayme Lloyd is a [de-identified] y. o. male who is seen in consultation for GI Bleed. The patient has a past medical history significant for CAD s/p PCI, dilated cardiomyopathy, CHFrEF, atrial fibrillation,. Admits to smoking 2 PPD of cigarettes since the age of 7, and drinks  A 40 oz beer daily. His last drink was about a week ago.  Mr. Kobi Chetser presented to the ED with complaints of shortness of breath which has been progressively worsening over the past 2 weeks and increased weakness. He denies nausea and vomiting. He does report his appetite has been poor. His last colonoscopy was 2 years ago and he reports they removed 4 polyps. His stool for occult blood is pending. He reports his bowel movements are normal but they have have been dark in color. On admission his hgB noted to at 6.8. He does have one unit infusing at time of exam. His EKG on admission shows Afib. His heart rate is controlled at this time. proBNP elevated at 7.967. Normal LFT's.  Cardiology was consulted for further evaluation. He reports he fell about a month ago but none recently. Lilia Shepherd for EGD in the am.     Chest x-ray 11/15/2021: INDICATION: Shortness of breath. Heart size is at the upper limits of normal.. There are bilateral pleural  effusions with bilateral diffuse airspace disease or edema. Multiple right rib  fractures. No pneumothorax.  Degenerative changes of the shoulders and spine  IMPRESSION1. Bilateral pleural effusions with bilateral airspace disease or edema. 2. Multiple right-sided rib fractures, probably old, recommend follow-up as warranted. Objective:     Vitals:    11/19/21 0726 11/19/21 0733 11/19/21 1116 11/19/21 1443   BP:  (!) 178/84 (!) 149/80 130/67   Pulse:  96 63 65   Resp:  19 20 20   Temp:  97.6 °F (36.4 °C) 97.5 °F (36.4 °C) 97.3 °F (36.3 °C)   SpO2: 96% 96% 98% 94%   Weight:       Height:            Intake and Output:  Current Shift: No intake/output data recorded. Last three shifts: 11/17 1901 - 11/19 0700  In: -   Out: 2950 [Urine:2950]    Physical Exam:   Skin:  Extremities and face reveal no rashes. No gabriel erythema. HEENT: Sclerae anicteric. Extra-occular muscles are intact. No abnormal pigmentation of the lips. The neck is supple. Cardiovascular: Regular rate and rhythm. Respiratory:  Comfortable breathing with no accessory muscle use. GI:  Abdomen nondistended, soft, and nontender. No enlargement of the liver or spleen. No masses palpable. Rectal:  Deferred  Musculoskeletal: Weak  Neurological:  Gross memory appears intact. Patient is alert and oriented. Psychiatric:  Mood appears appropriate with judgement intact. Lymphatic:  No visible adenopathy      Lab/Data Review:  No results found for this or any previous visit (from the past 24 hour(s)). XR CHEST SNGL V   Final Result   1. Bilateral pleural effusions with bilateral airspace disease or edema. 2. Multiple right-sided rib fractures, probably old, recommend follow-up as   warranted. Assessment:     Active Problems:    CHF (congestive heart failure) ([facility] [address] (11/15/2021)      PNA (pneumonia) (11/15/2021)        Plan:   1.  GI Bleed       S/P 1 Unit PRBC's on 11/16/21       Monitor H&H      Transfuse as needed      S/P EGD on 11/17/21 showing gastritis w/o bleeding      Continue PPI      Regular diet      No anticoagulation until seen as out patient     Follow up with GI 2 weeks post discharge. Declined colonoscopy this admission  2. CHFrEF     Elevated pro BNP 5,079      Lasix 40 mg BID      Continue beta blocker       Cardiology input appreciated  3. COPD with Exacerbation      Acute Hypoxic Respiratory Failure      Currently on 2 liters of oxygen       Pulmonologist input appreciated      Continue solumedrol   4. GERD      Continue Protonix 40 mg daily     From GI stand point patient is clear for discharge. GI signing off at this time. Please re-consult if needed. Thank you for allowing me to participate in this patients care. Plan discussed with Dr. Linda Jain and he approves.     Signed By: Georgia Ayers NP     November 19, 2021

## 2021-11-19 NOTE — PROGRESS NOTES
Report given to Stanford Negro (nursing supervisor at [facility] ). All questions answered. Manager from [facility] is going to  patient via car to transport him to [facility], around 5pm today.

## 2021-11-19 NOTE — PROGRESS NOTES
Physician Progress Note      PATIENT:               Tamiko Solis  CSN #:                  [id number]  :                       1940  ADMIT DATE:       11/15/2021 9:18 AM  DISCH DATE:  RESPONDING  PROVIDER #:        Kyleigh CHERRY MD          QUERY TEXT:    Pt admitted with shortness of breath and has CHF with reduced EF documented. If possible, please document in progress notes and discharge summary further specificity regarding the acuity of CHF:      The medical record reflects the following:  Risk Factors: [de-identified] yo male with CHF, afib, HTN  Clinical Indicators: CXR:  Bilateral pleural effusions with bilateral airspace disease or edema., BNP 3954  Treatment: IV Lasix    Thank you,  Adore Awad RN, CCDS, CPC  Options provided:  -- Acute on Chronic Systolic CHF/HFrEF  -- Acute Systolic CHF/HFrEF  -- Chronic Systolic CHF/HFrEF  -- Other - I will add my own diagnosis  -- Disagree - Not applicable / Not valid  -- Disagree - Clinically unable to determine / Unknown  -- Refer to Clinical Documentation Reviewer    PROVIDER RESPONSE TEXT:    This patient is in acute on chronic systolic CHF/HFrEF. Query created by: Amelie Jolly on 2021 12:13 PM      QUERY TEXT:    Patient admitted with shortness of breath. Documentation reflects NSTEMI in H&P. If possible, please document in the progress notes and discharge summary if NSTEMI was: The medical record reflects the following:  Risk Factors: [de-identified] yo male with CHF, afib, HTN  Clinical Indicators: Troponin 147,  EKG showed Atrial fibrillation  Nonspecific T wave abnormality; CV Consult 'Troponin were mildly elevated.   This is probably secondary to demand ischemia from A. fib with RVR and anemia.'  Treatment: IV Lasix, aspirin, metoprolol    Thank you,  Essence Simpson, DEE DEE, CCDS, CPC  Options provided:  -- NSTEM confirmed after study  -- NSTEM ruled out after study  -- Other - I will add my own diagnosis  -- Disagree - Not applicable / Not valid  -- Disagree - Clinically unable to determine / Unknown  -- Refer to Clinical Documentation Reviewer    PROVIDER RESPONSE TEXT:    NSTEM confirmed after study.     Query created by: Kristi Novak on 11/17/2021 12:16 PM      Electronically signed by:  Marycruz Silveira MD 11/19/2021 10:04 AM

## 2021-11-19 NOTE — PROGRESS NOTES
PULMONARY NOTE  [facility]    Name: Parisa Betancourt MRN: [id number]   : 1940 Hospital: [address] [place] [address]   Date: 2021  Admission date: 11/15/2021 Hospital Day: 5       HPI:     Hospital Problems  Date Reviewed: 2021          Codes Class Noted POA    CHF (congestive heart failure) ([facility] [address] ICD-10-CM: I50.9  ICD-9-CM: 428.0  11/15/2021 Unknown        PNA (pneumonia) ICD-10-CM: J18.9  ICD-9-CM: 486  11/15/2021 Unknown                   [x] High complexity decision making was performed  [x] See my orders for details      Subjective/Initial History:     I was asked by Teresa Miller MD to see Parisa Betancourt  a [de-identified] y.o.    male in consultation     Excerpts from admission 11/15/2021 or consult notes as follows:   27-year-old male came in because of shortness of breath and dyspnea was found to be in atrial fibrillation, he smokes 2 packs/day has been smoking since the age of 7 denies any history of chest pain but complaining about shortness of breath and dyspnea for the past 1 to 2 weeks x-ray was done which shows bilateral pleural effusion and some infiltrate and history of multiple right-sided rib fracture probably old so admitted and pulmonary consult was called for further evaluation      No Known Allergies     MAR reviewed and pertinent medications noted or modified as needed     Current Facility-Administered Medications   Medication    0.9% sodium chloride infusion    sodium chloride (NS) flush 5-40 mL    sodium chloride (NS) flush 5-40 mL    furosemide (LASIX) tablet 40 mg    budesonide-formoteroL (SYMBICORT) 160-4.5 mcg/actuation HFA inhaler 2 Puff    0.9% sodium chloride infusion 250 mL    methylPREDNISolone (PF) (SOLU-MEDROL) injection 40 mg    pantoprazole (PROTONIX) tablet 40 mg    0.9% sodium chloride infusion 250 mL    famotidine (PEPCID) tablet 20 mg    traMADoL (ULTRAM) tablet 50 mg    glucagon (GLUCAGEN) injection 1 mg    acetaminophen (TYLENOL) tablet 650 mg    Or    acetaminophen (TYLENOL) suppository 650 mg    polyethylene glycol (MIRALAX) packet 17 g    ondansetron (ZOFRAN ODT) tablet 4 mg    Or    ondansetron (ZOFRAN) injection 4 mg    piperacillin-tazobactam (ZOSYN) 3.375 g in 0.9% sodium chloride (MBP/ADV) 100 mL MBP    [Held by provider] aspirin chewable tablet 81 mg    albuterol (PROVENTIL HFA, VENTOLIN HFA, PROAIR HFA) inhaler 2 Puff    influenza vaccine  (6 mos+)(PF) (FLUARIX/FLULAVAL/FLUZONE QUAD) injection 0.5 mL    traZODone (DESYREL) tablet 25 mg      Patient PCP: Tamia Bennett MD  PMH:  has a past medical history of GERD (gastroesophageal reflux disease) and Hypertension. PSH:   has no past surgical history on file. FHX: family history is not on file. SHX:  reports that he has been smoking. He has been smoking about 2.00 packs per day. He has never used smokeless tobacco.     ROS:    Review of Systems   Constitutional: Positive for malaise/fatigue. HENT: Negative. Eyes: Negative. Respiratory: Positive for cough, sputum production and wheezing. Cardiovascular: Negative. Gastrointestinal: Negative. Genitourinary: Negative. Musculoskeletal: Negative. Skin: Negative. Neurological: Negative. Psychiatric/Behavioral: Negative.          Objective:     Vital Signs: Telemetry:    AFIB Intake/Output:   Visit Vitals  BP (!) 178/84 (BP 1 Location: Left upper arm, BP Patient Position: At rest)   Pulse 96   Temp 97.6 °F (36.4 °C)   Resp 19   Ht 5' 11.5\" (1.816 m)   Wt 80.1 kg (176 lb 9.4 oz)   SpO2 96%   BMI 24.29 kg/m²       Temp (24hrs), Av.8 °F (36.6 °C), Min:97.6 °F (36.4 °C), Max:98.2 °F (36.8 °C)        O2 Device: None (Room air) O2 Flow Rate (L/min): 2 l/min       Wt Readings from Last 4 Encounters:   21 80.1 kg (176 lb 9.4 oz)          Intake/Output Summary (Last 24 hours) at 2021 0853  Last data filed at 2021 0546  Gross per 24 hour   Intake    Output 2950 ml   Net -2950 ml       Last shift:      No intake/output data recorded. Last 3 shifts: 11/17 1901 - 11/19 0700  In: -   Out: 2950 [Urine:2950]       Physical Exam:     Physical Exam  Constitutional:       Appearance: Normal appearance. HENT:      Head: Normocephalic and atraumatic. Nose: Nose normal.      Mouth/Throat:      Mouth: Mucous membranes are moist.   Eyes:      Pupils: Pupils are equal, round, and reactive to light. Cardiovascular:      Rate and Rhythm: Normal rate. Rhythm irregular. Pulses: Normal pulses. Heart sounds: Normal heart sounds. Pulmonary:      Effort: Pulmonary effort is normal.      Breath sounds: Wheezing present. Abdominal:      General: Abdomen is flat. Bowel sounds are normal.   Musculoskeletal:         General: Normal range of motion. Cervical back: Normal range of motion and neck supple. Skin:     General: Skin is warm. Neurological:      Mental Status: He is alert. Labs:    Recent Labs     11/18/21  0632 11/17/21  0420   WBC 11.9* 14.7*   HGB 8.2* 8.2*    391     Recent Labs     11/18/21  0632 11/17/21  0420    136   K 4.1 3.8    104   CO2 27 25   * 138*   BUN 23* 30*   CREA 1.00 1.38*   CA 8.3* 8.8   ALB 2.9* 2.8*   ALT 28 14     No results for input(s): PH, PCO2, PO2, HCO3, FIO2 in the last 72 hours. No results for input(s): CPK, CKNDX, TROIQ in the last 72 hours. No lab exists for component: CPKMB  No results found for: BNPP, BNP   Lab Results   Component Value Date/Time    Culture result:  11/15/2021 12:00 PM     One of four bottles has been flagged positive by instrument. Bottle has been sent to [facility] laboratory to assess for possible growth.  Gram Positive Cocci in clusters CALLED TO AND READ BACK BY Anderson Storm R.N. 0800 11/18/2021 BY DPW    Culture result:  11/15/2021 12:00 PM     Staphylococcus species, coagulase negative GROWING IN THE ANAEROBIC BOTTLE NO SITE INDICATED   No results found for: TSH, TSHEXT, TSHEXT    Imaging:    CXR Results  (Last 48 hours)    None        Results from Hospital Encounter encounter on 11/15/21    XR CHEST SNGL V    Narrative  Portable upright radiograph chest 10:02 a.m.. No prior study. INDICATION: Shortness of breath. Heart size is at the upper limits of normal.. There are bilateral pleural  effusions with bilateral diffuse airspace disease or edema. Multiple right rib  fractures. No pneumothorax. Degenerative changes of the shoulders and spine. Impression  1. Bilateral pleural effusions with bilateral airspace disease or edema. 2. Multiple right-sided rib fractures, probably old, recommend follow-up as  warranted. No results found for this or any previous visit. IMPRESSION:   1. Acute hypoxic respiratory failure  2. Chronic Obstructive Pulmonary Disease   3. Atrial fibrillation with RVR  4. Congestive heart failure rule out cor pulmonale  5. Tobacco abuse  6. Anemia  7. Severe pulmonary hypertension most likely secondary to above      RECOMMENDATIONS/PLAN:     1. Patient is on room air    2. Now is in A. fib with RVR cardiology on case  3. Chest x-ray shows most likely congestive heart failure  4. Anemia hemoglobin 6.8 transfuse packed RBC today's hemoglobin 8.2  5. Status post EGD shows gastritis but no bleeding schedule for colonoscopy as an outpatient  6. Smoking cessation counseling done offered nicotine patch but patient declined  Pt's spo2 on room air at rest=95%. Pt's spo2 on room air with ambulation=96%.        ·           Danielle Anaya MD

## 2021-11-19 NOTE — PROGRESS NOTES
Patient taken downstairs via wheelchair to meet transport. Patient had initially refused to take belongings bag with him but we discovered his wallet was inside and gave it to him to take with him. Per patient request, threw away remainder of belongings (soiled clothes).

## 2022-03-02 ENCOUNTER — HOSPITAL ENCOUNTER (INPATIENT)
Age: 82
LOS: 6 days | Discharge: HOME HEALTH | DRG: 291 | End: 2022-03-08
Attending: EMERGENCY MEDICINE | Admitting: INTERNAL MEDICINE
Payer: MEDICARE

## 2022-03-02 ENCOUNTER — APPOINTMENT (OUTPATIENT)
Dept: CT IMAGING | Age: 82
DRG: 291 | End: 2022-03-02
Attending: EMERGENCY MEDICINE
Payer: MEDICARE

## 2022-03-02 ENCOUNTER — APPOINTMENT (OUTPATIENT)
Dept: GENERAL RADIOLOGY | Age: 82
DRG: 291 | End: 2022-03-02
Attending: EMERGENCY MEDICINE
Payer: MEDICARE

## 2022-03-02 DIAGNOSIS — I50.43 ACUTE ON CHRONIC COMBINED SYSTOLIC AND DIASTOLIC CONGESTIVE HEART FAILURE (HCC): Primary | ICD-10-CM

## 2022-03-02 PROBLEM — J96.01 ACUTE RESPIRATORY FAILURE WITH HYPOXIA (HCC): Status: ACTIVE | Noted: 2022-03-02

## 2022-03-02 LAB
ALBUMIN SERPL-MCNC: 3.5 G/DL (ref 3.5–5)
ALBUMIN/GLOB SERPL: 1 {RATIO} (ref 1.1–2.2)
ALP SERPL-CCNC: 85 U/L (ref 45–117)
ALT SERPL-CCNC: 15 U/L (ref 12–78)
ANION GAP SERPL CALC-SCNC: 6 MMOL/L (ref 5–15)
APTT PPP: 32.1 SEC (ref 21.2–34.1)
AST SERPL W P-5'-P-CCNC: 13 U/L (ref 15–37)
ATRIAL RATE: 94 BPM
BASOPHILS # BLD: 0.1 K/UL (ref 0–0.1)
BASOPHILS NFR BLD: 1 % (ref 0–1)
BILIRUB SERPL-MCNC: 0.5 MG/DL (ref 0.2–1)
BNP SERPL-MCNC: 1823 PG/ML
BUN SERPL-MCNC: 14 MG/DL (ref 6–20)
BUN/CREAT SERPL: 15 (ref 12–20)
CA-I BLD-MCNC: 8.5 MG/DL (ref 8.5–10.1)
CALCULATED R AXIS, ECG10: 30 DEGREES
CALCULATED T AXIS, ECG11: 51 DEGREES
CHLORIDE SERPL-SCNC: 109 MMOL/L (ref 97–108)
CO2 SERPL-SCNC: 25 MMOL/L (ref 21–32)
COVID-19 RAPID TEST, COVR: NOT DETECTED
CREAT SERPL-MCNC: 0.95 MG/DL (ref 0.7–1.3)
DIAGNOSIS, 93000: NORMAL
DIFFERENTIAL METHOD BLD: ABNORMAL
EOSINOPHIL # BLD: 0.2 K/UL (ref 0–0.4)
EOSINOPHIL NFR BLD: 2 % (ref 0–7)
ERYTHROCYTE [DISTWIDTH] IN BLOOD BY AUTOMATED COUNT: 19.8 % (ref 11.5–14.5)
GLOBULIN SER CALC-MCNC: 3.5 G/DL (ref 2–4)
GLUCOSE SERPL-MCNC: 162 MG/DL (ref 65–100)
HCT VFR BLD AUTO: 28.3 % (ref 36.6–50.3)
HGB BLD-MCNC: 7.8 G/DL (ref 12.1–17)
IMM GRANULOCYTES # BLD AUTO: 0.1 K/UL (ref 0–0.04)
IMM GRANULOCYTES NFR BLD AUTO: 1 % (ref 0–0.5)
INR PPP: 1.1 (ref 0.9–1.1)
LYMPHOCYTES # BLD: 1.2 K/UL (ref 0.8–3.5)
LYMPHOCYTES NFR BLD: 8 % (ref 12–49)
MCH RBC QN AUTO: 19.4 PG (ref 26–34)
MCHC RBC AUTO-ENTMCNC: 27.6 G/DL (ref 30–36.5)
MCV RBC AUTO: 70.2 FL (ref 80–99)
MONOCYTES # BLD: 1 K/UL (ref 0–1)
MONOCYTES NFR BLD: 7 % (ref 5–13)
NEUTS SEG # BLD: 12 K/UL (ref 1.8–8)
NEUTS SEG NFR BLD: 81 % (ref 32–75)
NRBC # BLD: 0 K/UL (ref 0–0.01)
NRBC BLD-RTO: 0 PER 100 WBC
PLATELET # BLD AUTO: 306 K/UL (ref 150–400)
PMV BLD AUTO: 10.5 FL (ref 8.9–12.9)
POTASSIUM SERPL-SCNC: 3.8 MMOL/L (ref 3.5–5.1)
PROCALCITONIN SERPL-MCNC: <0.05 NG/ML
PROT SERPL-MCNC: 7 G/DL (ref 6.4–8.2)
PROTHROMBIN TIME: 14.4 SEC (ref 11.9–14.6)
Q-T INTERVAL, ECG07: 366 MS
QRS DURATION, ECG06: 86 MS
QTC CALCULATION (BEZET), ECG08: 450 MS
RBC # BLD AUTO: 4.03 M/UL (ref 4.1–5.7)
SODIUM SERPL-SCNC: 140 MMOL/L (ref 136–145)
THERAPEUTIC RANGE,PTTT: NORMAL SEC (ref 82–109)
TROPONIN-HIGH SENSITIVITY: 17 NG/L (ref 0–76)
TROPONIN-HIGH SENSITIVITY: 54 NG/L (ref 0–76)
VENTRICULAR RATE, ECG03: 91 BPM
WBC # BLD AUTO: 14.6 K/UL (ref 4.1–11.1)

## 2022-03-02 PROCEDURE — 94640 AIRWAY INHALATION TREATMENT: CPT

## 2022-03-02 PROCEDURE — 74011250636 HC RX REV CODE- 250/636: Performed by: EMERGENCY MEDICINE

## 2022-03-02 PROCEDURE — 99285 EMERGENCY DEPT VISIT HI MDM: CPT

## 2022-03-02 PROCEDURE — 74011000250 HC RX REV CODE- 250: Performed by: INTERNAL MEDICINE

## 2022-03-02 PROCEDURE — 77010033678 HC OXYGEN DAILY

## 2022-03-02 PROCEDURE — 96374 THER/PROPH/DIAG INJ IV PUSH: CPT

## 2022-03-02 PROCEDURE — 83880 ASSAY OF NATRIURETIC PEPTIDE: CPT

## 2022-03-02 PROCEDURE — 80053 COMPREHEN METABOLIC PANEL: CPT

## 2022-03-02 PROCEDURE — 74011250637 HC RX REV CODE- 250/637: Performed by: INTERNAL MEDICINE

## 2022-03-02 PROCEDURE — 94668 MNPJ CHEST WALL SBSQ: CPT

## 2022-03-02 PROCEDURE — 93005 ELECTROCARDIOGRAM TRACING: CPT

## 2022-03-02 PROCEDURE — 71045 X-RAY EXAM CHEST 1 VIEW: CPT

## 2022-03-02 PROCEDURE — 85730 THROMBOPLASTIN TIME PARTIAL: CPT

## 2022-03-02 PROCEDURE — 74011250636 HC RX REV CODE- 250/636: Performed by: INTERNAL MEDICINE

## 2022-03-02 PROCEDURE — 84484 ASSAY OF TROPONIN QUANT: CPT

## 2022-03-02 PROCEDURE — 85610 PROTHROMBIN TIME: CPT

## 2022-03-02 PROCEDURE — 65270000029 HC RM PRIVATE

## 2022-03-02 PROCEDURE — 71250 CT THORAX DX C-: CPT

## 2022-03-02 PROCEDURE — 87635 SARS-COV-2 COVID-19 AMP PRB: CPT

## 2022-03-02 PROCEDURE — 94667 MNPJ CHEST WALL 1ST: CPT

## 2022-03-02 PROCEDURE — 84145 PROCALCITONIN (PCT): CPT

## 2022-03-02 PROCEDURE — 74011250637 HC RX REV CODE- 250/637: Performed by: EMERGENCY MEDICINE

## 2022-03-02 PROCEDURE — 36415 COLL VENOUS BLD VENIPUNCTURE: CPT

## 2022-03-02 PROCEDURE — 85025 COMPLETE CBC W/AUTO DIFF WBC: CPT

## 2022-03-02 RX ORDER — OMEPRAZOLE 20 MG/1
20 CAPSULE, DELAYED RELEASE ORAL DAILY
COMMUNITY
Start: 2021-11-30

## 2022-03-02 RX ORDER — CLOPIDOGREL BISULFATE 75 MG/1
75 TABLET ORAL DAILY
COMMUNITY
Start: 2021-11-30

## 2022-03-02 RX ORDER — FUROSEMIDE 10 MG/ML
40 INJECTION INTRAMUSCULAR; INTRAVENOUS ONCE
Status: COMPLETED | OUTPATIENT
Start: 2022-03-02 | End: 2022-03-02

## 2022-03-02 RX ORDER — IBUPROFEN 200 MG
1 TABLET ORAL
Status: DISCONTINUED | OUTPATIENT
Start: 2022-03-02 | End: 2022-03-08 | Stop reason: HOSPADM

## 2022-03-02 RX ORDER — LISINOPRIL 20 MG/1
20 TABLET ORAL DAILY
COMMUNITY
Start: 2021-11-30

## 2022-03-02 RX ORDER — ONDANSETRON 2 MG/ML
4 INJECTION INTRAMUSCULAR; INTRAVENOUS
Status: DISCONTINUED | OUTPATIENT
Start: 2022-03-02 | End: 2022-03-08 | Stop reason: HOSPADM

## 2022-03-02 RX ORDER — AMLODIPINE BESYLATE 10 MG/1
10 TABLET ORAL DAILY
COMMUNITY
Start: 2021-11-30 | End: 2022-03-08

## 2022-03-02 RX ORDER — PANTOPRAZOLE SODIUM 40 MG/1
40 TABLET, DELAYED RELEASE ORAL DAILY
Status: DISCONTINUED | OUTPATIENT
Start: 2022-03-03 | End: 2022-03-08 | Stop reason: HOSPADM

## 2022-03-02 RX ORDER — BISMUTH SUBSALICYLATE 262 MG
1 TABLET,CHEWABLE ORAL DAILY
COMMUNITY

## 2022-03-02 RX ORDER — GUAIFENESIN 600 MG/1
600 TABLET, EXTENDED RELEASE ORAL EVERY 12 HOURS
Status: DISCONTINUED | OUTPATIENT
Start: 2022-03-02 | End: 2022-03-08 | Stop reason: HOSPADM

## 2022-03-02 RX ORDER — ASPIRIN 81 MG/1
81 TABLET ORAL DAILY
Status: DISCONTINUED | OUTPATIENT
Start: 2022-03-03 | End: 2022-03-08 | Stop reason: HOSPADM

## 2022-03-02 RX ORDER — BUDESONIDE AND FORMOTEROL FUMARATE DIHYDRATE 160; 4.5 UG/1; UG/1
2 AEROSOL RESPIRATORY (INHALATION) 2 TIMES DAILY
Status: DISCONTINUED | OUTPATIENT
Start: 2022-03-02 | End: 2022-03-04

## 2022-03-02 RX ORDER — NITROGLYCERIN 0.4 MG/1
0.4 TABLET SUBLINGUAL
Status: DISCONTINUED | OUTPATIENT
Start: 2022-03-02 | End: 2022-03-08 | Stop reason: HOSPADM

## 2022-03-02 RX ORDER — TRAZODONE HYDROCHLORIDE 50 MG/1
50 TABLET ORAL
Status: DISCONTINUED | OUTPATIENT
Start: 2022-03-02 | End: 2022-03-08 | Stop reason: HOSPADM

## 2022-03-02 RX ORDER — TAMSULOSIN HYDROCHLORIDE 0.4 MG/1
2 CAPSULE ORAL DAILY
COMMUNITY
Start: 2022-02-01

## 2022-03-02 RX ORDER — ASPIRIN 325 MG
325 TABLET ORAL ONCE
Status: ACTIVE | OUTPATIENT
Start: 2022-03-02 | End: 2022-03-02

## 2022-03-02 RX ORDER — LISINOPRIL 5 MG/1
5 TABLET ORAL DAILY
Status: DISCONTINUED | OUTPATIENT
Start: 2022-03-02 | End: 2022-03-08 | Stop reason: HOSPADM

## 2022-03-02 RX ORDER — DOCUSATE SODIUM 100 MG/1
100 CAPSULE, LIQUID FILLED ORAL AS NEEDED
Status: DISCONTINUED | OUTPATIENT
Start: 2022-03-02 | End: 2022-03-08 | Stop reason: HOSPADM

## 2022-03-02 RX ORDER — IPRATROPIUM BROMIDE AND ALBUTEROL SULFATE 2.5; .5 MG/3ML; MG/3ML
3 SOLUTION RESPIRATORY (INHALATION)
Status: DISCONTINUED | OUTPATIENT
Start: 2022-03-02 | End: 2022-03-03

## 2022-03-02 RX ORDER — CARVEDILOL 3.12 MG/1
3.12 TABLET ORAL 2 TIMES DAILY WITH MEALS
Status: DISCONTINUED | OUTPATIENT
Start: 2022-03-02 | End: 2022-03-08 | Stop reason: HOSPADM

## 2022-03-02 RX ORDER — ALBUTEROL SULFATE 0.83 MG/ML
1.25 SOLUTION RESPIRATORY (INHALATION)
Status: DISCONTINUED | OUTPATIENT
Start: 2022-03-02 | End: 2022-03-08 | Stop reason: HOSPADM

## 2022-03-02 RX ORDER — FUROSEMIDE 10 MG/ML
40 INJECTION INTRAMUSCULAR; INTRAVENOUS 2 TIMES DAILY
Status: COMPLETED | OUTPATIENT
Start: 2022-03-02 | End: 2022-03-04

## 2022-03-02 RX ORDER — ACETAMINOPHEN 325 MG/1
650 TABLET ORAL
Status: DISCONTINUED | OUTPATIENT
Start: 2022-03-02 | End: 2022-03-08 | Stop reason: HOSPADM

## 2022-03-02 RX ADMIN — METHYLPREDNISOLONE SODIUM SUCCINATE 40 MG: 40 INJECTION, POWDER, FOR SOLUTION INTRAMUSCULAR; INTRAVENOUS at 21:31

## 2022-03-02 RX ADMIN — BUDESONIDE AND FORMOTEROL FUMARATE DIHYDRATE 2 PUFF: 160; 4.5 AEROSOL RESPIRATORY (INHALATION) at 19:34

## 2022-03-02 RX ADMIN — LISINOPRIL 5 MG: 5 TABLET ORAL at 14:00

## 2022-03-02 RX ADMIN — FUROSEMIDE 40 MG: 10 INJECTION, SOLUTION INTRAMUSCULAR; INTRAVENOUS at 08:24

## 2022-03-02 RX ADMIN — GUAIFENESIN 600 MG: 600 TABLET, EXTENDED RELEASE ORAL at 14:00

## 2022-03-02 RX ADMIN — NITROGLYCERIN 0.4 MG: 0.4 TABLET, ORALLY DISINTEGRATING SUBLINGUAL at 08:24

## 2022-03-02 RX ADMIN — IPRATROPIUM BROMIDE AND ALBUTEROL SULFATE 3 ML: .5; 3 SOLUTION RESPIRATORY (INHALATION) at 19:34

## 2022-03-02 RX ADMIN — CARVEDILOL 3.12 MG: 3.12 TABLET, FILM COATED ORAL at 18:05

## 2022-03-02 RX ADMIN — IPRATROPIUM BROMIDE AND ALBUTEROL SULFATE 3 ML: .5; 3 SOLUTION RESPIRATORY (INHALATION) at 14:45

## 2022-03-02 RX ADMIN — GUAIFENESIN 600 MG: 600 TABLET, EXTENDED RELEASE ORAL at 21:31

## 2022-03-02 RX ADMIN — METHYLPREDNISOLONE SODIUM SUCCINATE 40 MG: 40 INJECTION, POWDER, FOR SOLUTION INTRAMUSCULAR; INTRAVENOUS at 14:00

## 2022-03-02 RX ADMIN — TRAZODONE HYDROCHLORIDE 50 MG: 50 TABLET ORAL at 21:31

## 2022-03-02 RX ADMIN — FUROSEMIDE 40 MG: 10 INJECTION, SOLUTION INTRAMUSCULAR; INTRAVENOUS at 21:31

## 2022-03-02 RX ADMIN — AZITHROMYCIN MONOHYDRATE 500 MG: 500 INJECTION, POWDER, LYOPHILIZED, FOR SOLUTION INTRAVENOUS at 13:57

## 2022-03-02 NOTE — PROGRESS NOTES
3/2/22. PCP is Dr. Noemí Coleman - seen lat in Feb 2022. D/C Plan is [facility] Pushpa Escalera @ [phone]) to transport home upon discharge. Pt uses cane @ times/no home health @ this time.

## 2022-03-02 NOTE — PROGRESS NOTES
Problem: Falls - Risk of  Goal: *Absence of Falls  Description: Document Edel Oconnor Fall Risk and appropriate interventions in the flowsheet.   Outcome: Progressing Towards Goal  Note: Fall Risk Interventions:  Mobility Interventions: Bed/chair exit alarm,Patient to call before getting OOB              Elimination Interventions: Bed/chair exit alarm,Call light in reach,Patient to call for help with toileting needs    History of Falls Interventions: Bed/chair exit alarm,Door open when patient unattended,Room close to nurse's station         Problem: Patient Education: Go to Patient Education Activity  Goal: Patient/Family Education  Outcome: Progressing Towards Goal

## 2022-03-02 NOTE — ACP (ADVANCE CARE PLANNING)
Advance Care Planning   Healthcare Decision Maker:       Primary Decision Maker: Janki Ribeiro ( Owner/Caregiver) - Caregiver - [phone]

## 2022-03-02 NOTE — ED NOTES
TRANSFER - OUT REPORT:    Verbal report given to Kathlee Simmonds (name) on Angelina Rolls  being transferred to [facility] (unit) for routine progression of care       Report consisted of patients Situation, Background, Assessment and   Recommendations(SBAR). Information from the following report(s) SBAR was reviewed with the receiving nurse. Lines:   Peripheral IV 03/02/22 Left Antecubital (Active)   Site Assessment Clean, dry, & intact 03/02/22 0813   Phlebitis Assessment 0 03/02/22 0813   Infiltration Assessment 0 03/02/22 0813   Dressing Status Clean, dry, & intact 03/02/22 0813       Peripheral IV 03/02/22 Right Antecubital (Active)   Site Assessment Clean, dry, & intact 03/02/22 0814   Phlebitis Assessment 0 03/02/22 0814   Infiltration Assessment 0 03/02/22 0814   Dressing Status Clean, dry, & intact 03/02/22 2166        Opportunity for questions and clarification was provided.       Patient transported with:   [employer]

## 2022-03-02 NOTE — PROGRESS NOTES
Primary Nurse Marielle Thompson RN and Chris Correa RN performed a dual skin assessment on this patient {    No pressure injuries noted on admission

## 2022-03-02 NOTE — ED TRIAGE NOTES
Pt BIB EMS from a group home with chest pain and SOB began today in the morning, hx of MI a month ago, states that he feels this is similar to when he had MI a month ago. Pt satting 50% on RA when EMS arrived, pt now on NRB 15L, satting 98   EMS gave 324 ASA.

## 2022-03-02 NOTE — H&P
History & Physical    Primary Care Provider: Rubén Puckett MD  Source of Information: Patient     History of Presenting Illness:   Wilbert Soto is a 81 y.o. male who presents with a history of CAD, hypertension, GERD, persistent atrial fibrillation, COPD, presents to the emergency department secondary to this of breath. Patient states that he went to bed last night in his usual state of health, woke up this morning feeling very short of breath, worse when lying flat, wheezing reported to me, EMS was called and reportedly his room air saturation was in the 50s. Incidentally is not on supplemental home O2. Patient's O2 improved to 100% on a nonrebreather mask and he was brought in for evaluation. Initial ED blood pressure 158/66, saturating 90% on nonrebreather mask, afebrile. EKG suggest controlled ventricular rate with atrial fibrillation. WBC elevated to 14.6, hemoglobin 7.8, renal function preserved, chest x-ray suggestive of bilateral pleural effusions, dependent, right greater than left,  patchy reticular markings throughout both lungs most dense at the bases. His BNP is roughly 1800, last in November/2021 BNP was in the 5000 range. His echocardiogram 11/2021 reported preserved LVEF, grade 2 diastolic dysfunction, moderate mitral valve regurgitation and moderate tricuspid valve regurgitation. Diuresis initiated in the emergency department suspecting CHF decompensation/flash pulmonary edema. Maintained O2 saturation at 100% on nonrebreather mask. Patient denies any fevers or chills, denies any productive cough, hemoptysis, nausea, vomiting, diarrhea. He has no chest pain or palpitations. Has no diarrhea constipation bloody or dark stools and denies any issues voiding. Review of Systems:  A comprehensive review of systems was negative except for that written in the History of Present Illness.      Past Medical History:   Diagnosis Date    GERD (gastroesophageal reflux disease)     Hypertension       No past surgical history on file. Prior to Admission medications    Medication Sig Start Date End Date Taking? Authorizing Provider   albuterol (PROVENTIL HFA, VENTOLIN HFA, PROAIR HFA) 90 mcg/actuation inhaler Take 2 Puffs by inhalation every four (4) hours as needed for Wheezing or Shortness of Breath. 11/18/21   Mohiuddin, Gibson Babinski, MD   budesonide-formoteroL [facility]) 160-4.5 mcg/actuation HFAA Take 2 Puffs by inhalation two (2) times a day. 11/18/21   Mohiuddin, Gibson Babinski, MD   famotidine (PEPCID) 20 mg tablet Take 1 Tablet by mouth two (2) times a day. 11/18/21   Mohiuddin, Gibson Babinski, MD   furosemide (LASIX) 40 mg tablet Lasix 40 mg daily 11/19/21   Genesis Sanchez MD   predniSONE (DELTASONE) 10 mg tablet 2 tablets daily 11/18/21   Genesis Sanchez MD   pantoprazole (PROTONIX) 40 mg tablet Take 1 Tablet by mouth daily. 11/19/21   Mohiuddin, Gibson Babinski, MD   amoxicillin-clavulanate (Augmentin) 875-125 mg per tablet Take 1 Tablet by mouth two (2) times a day. 11/18/21   Mohiuddin, Gibson Babinski, MD   traZODone (DESYREL) 50 mg tablet Take 1 Tablet by mouth nightly as needed. 9/3/21   Provider, Historical     No Known Allergies   No family history on file. SOCIAL HISTORY:  Patient resides:  Independently x   Assisted Living    SNF    With family care       Smoking history:   None    Former    Chronic x     Alcohol history:   None x   Social    Chronic      Ambulates:   Independently    w/cane x   w/walker    w/wc    CODE STATUS:  DNR    Full x   Other      Objective:     Physical Exam:     Visit Vitals  BP (!) 147/79   Pulse 69   Temp 96.8 °F (36 °C)   Resp 18   Ht 5' 11\" (1.803 m)   Wt 72.6 kg (160 lb)   SpO2 100%   BMI 22.32 kg/m²    O2 Flow Rate (L/min): 15 l/min O2 Device: Non-rebreather mask    General:  Alert, cooperative, no distress, appears stated age. Head:  Normocephalic, without obvious abnormality, atraumatic. Eyes:  Conjunctivae/corneas clear.   EOMs intact. Throat: Moist oral mucosa   Neck: Supple, symmetrical, trachea midline, no adenopathy, no JVD. Lungs:    Diminished diffusely, mild expiratory wheeze, not labored, Rales at the bases. Chest wall:  No tenderness or deformity. Heart:   Irregular, heart rate 91, S1, S2 normal, no murmur, click, rub or gallop. Abdomen:   Soft, non-tender. , not distended, Bowel sounds present,  No rebound or guarding. Extremities: Extremities normal, atraumatic, no cyanosis. 3+ pitting edema. Pulses: 2+ and symmetric all extremities. Skin: Warm,dry   Neurologic: CNII-XII intact. No motor or sensory deficits. EKG:  normal EKG, normal sinus rhythm, unchanged from previous tracings, atrial fibrillation, rate 91. Data Review:     Recent Days:  Recent Labs     03/02/22  0804   WBC 14.6*   HGB 7.8*   HCT 28.3*        Recent Labs     03/02/22  0804      K 3.8   *   CO2 25   *   BUN 14   CREA 0.95   CA 8.5   ALB 3.5   TBILI 0.5   ALT 15   INR 1.1     No results for input(s): PH, PCO2, PO2, HCO3, FIO2 in the last 72 hours. 24 Hour Results:  Recent Results (from the past 24 hour(s))   CBC WITH AUTOMATED DIFF    Collection Time: 03/02/22  8:04 AM   Result Value Ref Range    WBC 14.6 (H) 4.1 - 11.1 K/uL    RBC 4.03 (L) 4.10 - 5.70 M/uL    HGB 7.8 (L) 12.1 - 17.0 g/dL    HCT 28.3 (L) 36.6 - 50.3 %    MCV 70.2 (L) 80.0 - 99.0 FL    MCH 19.4 (L) 26.0 - 34.0 PG    MCHC 27.6 (L) 30.0 - 36.5 g/dL    RDW 19.8 (H) 11.5 - 14.5 %    PLATELET 432 462 - 950 K/uL    MPV 10.5 8.9 - 12.9 FL    NRBC 0.0 0.0  WBC    ABSOLUTE NRBC 0.00 0.00 - 0.01 K/uL    NEUTROPHILS 81 (H) 32 - 75 %    LYMPHOCYTES 8 (L) 12 - 49 %    MONOCYTES 7 5 - 13 %    EOSINOPHILS 2 0 - 7 %    BASOPHILS 1 0 - 1 %    IMMATURE GRANULOCYTES 1 (H) 0 - 0.5 %    ABS. NEUTROPHILS 12.0 (H) 1.8 - 8.0 K/UL    ABS. LYMPHOCYTES 1.2 0.8 - 3.5 K/UL    ABS. MONOCYTES 1.0 0.0 - 1.0 K/UL    ABS. EOSINOPHILS 0.2 0.0 - 0.4 K/UL    ABS. BASOPHILS 0.1 0.0 - 0.1 K/UL    ABS. IMM. GRANS. 0.1 (H) 0.00 - 0.04 K/UL    DF AUTOMATED     METABOLIC PANEL, COMPREHENSIVE    Collection Time: 03/02/22  8:04 AM   Result Value Ref Range    Sodium 140 136 - 145 mmol/L    Potassium 3.8 3.5 - 5.1 mmol/L    Chloride 109 (H) 97 - 108 mmol/L    CO2 25 21 - 32 mmol/L    Anion gap 6 5 - 15 mmol/L    Glucose 162 (H) 65 - 100 mg/dL    BUN 14 6 - 20 mg/dL    Creatinine 0.95 0.70 - 1.30 mg/dL    BUN/Creatinine ratio 15 12 - 20      GFR est AA >60 >60 ml/min/1.73m2    GFR est non-AA >60 >60 ml/min/1.73m2    Calcium 8.5 8.5 - 10.1 mg/dL    Bilirubin, total 0.5 0.2 - 1.0 mg/dL    AST (SGOT) 13 (L) 15 - 37 U/L    ALT (SGPT) 15 12 - 78 U/L    Alk.  phosphatase 85 45 - 117 U/L    Protein, total 7.0 6.4 - 8.2 g/dL    Albumin 3.5 3.5 - 5.0 g/dL    Globulin 3.5 2.0 - 4.0 g/dL    A-G Ratio 1.0 (L) 1.1 - 2.2     NT-PRO BNP    Collection Time: 03/02/22  8:04 AM   Result Value Ref Range    NT pro-BNP 1,823 (H) <450 pg/mL   TROPONIN-HIGH SENSITIVITY    Collection Time: 03/02/22  8:04 AM   Result Value Ref Range    Troponin-High Sensitivity 17 0 - 76 ng/L   COVID-19 RAPID TEST    Collection Time: 03/02/22  8:04 AM   Result Value Ref Range    COVID-19 rapid test Not Detected Not Detected     PTT    Collection Time: 03/02/22  8:04 AM   Result Value Ref Range    aPTT 32.1 21.2 - 34.1 sec    aPTT, therapeutic range   82 - 109 sec   PROTHROMBIN TIME + INR    Collection Time: 03/02/22  8:04 AM   Result Value Ref Range    Prothrombin time 14.4 11.9 - 14.6 sec    INR 1.1 0.9 - 1.1     EKG, 12 LEAD, INITIAL    Collection Time: 03/02/22  8:10 AM   Result Value Ref Range    Ventricular Rate 91 BPM    Atrial Rate 94 BPM    QRS Duration 86 ms    Q-T Interval 366 ms    QTC Calculation (Bezet) 450 ms    Calculated R Axis 30 degrees    Calculated T Axis 51 degrees    Diagnosis       Atrial fibrillation  Low voltage QRS  Borderline ECG  When compared with ECG of 02-MAR-2022 07:54, (Unconfirmed)  No significant change was found  Confirmed by Wanda Vasquez MD, Manuel Agustín ([id number]) on 3/2/2022 11:03:48 AM     PROCALCITONIN    Collection Time: 03/02/22  9:45 AM   Result Value Ref Range    Procalcitonin <0.05 (H) 0 ng/mL         Imaging:     Assessment:     Active Problems:    Acute respiratory failure with hypoxia ([address] (3/2/2022)         81year-old gentleman, known history of diastolic CHF/chronic, persistent atrial fibrillation, CAD describing ACS approximately 4 months ago treated at [facility], still smoking 1 pack of cigarettes daily, down from 2 packs to 4 months ago. Presents with acute onset of shortness of breath this morning, flash pulmonary edema more likely, pneumonia is a possibility, atypical, Covid is negative and he has been vaccinated and boosted. Initial EMS SPO2 reportedly in the 50s, improved to 100% on a nonrebreather mask where he remains currently appearing nonlabored in the emergency room. Diuresis initiated in the ED, does feel some improvement. Admitting secondary to:  -Acute hypoxic respiratory failure  -Acute/chronic diastolic CHF + history of at least moderate pulmonary hypertension, component right heart failure  -Possible COPD exacerbation.  -cad, negative troponin  -Hypertension  -Covid not detected, patient is up-to-date with his Covid vaccinations.  -Pro-Ivan less than 0.5, leukocytosis questionably reactive, feeling at this is less likely bacterial pneumonia.  -Chronic tobacco abuse, currently he says less than 1 pack a day, previously up to 4 months ago was smoking 2 packs of cigarettes daily. Plan:     -IP admission  -Continue diuresis, Lasix IV 40 mg twice daily, KVNG's and daily weights  -add coreg, lisinopril, prn ntp 1\"  -We will not repeat echo as he had one in November  -Consulting cardiology, seen by Natasha Hartmann on his last visit  -We will start on Solu-Medrol, duo nebs scheduled, albuterol prn, mucolytic's, vibratory therapy, resume symbicort.    -Supplemental O2 to maintain sats above 90%, wean down from NRB as tolerated  -Pulmonology consultation, seen by Dr. Gabriela Gonzalez on his last visit  -Reviewed and reconciled home medications as presented. I discussed with him the medications and though he does not know them he states they have not been changed since November. We will need to confirm that these are his current medications. He does not appear to be on chronic anticoagulation though he appears to have chronic atrial fibrillation. I do not feel that medication are up to date on med-rec.  -Tobacco cessation is strongly encouraged, NicoDerm is offered though he declines. VTEP: Lovenox  Full CODE STATUS  Disposition: Patient resides at [facility]. Will get PT/OT ultimately for recommendations on disposition, anticipating a return to [facility].   Signed By: Aby Duong MD     March 2, 2022

## 2022-03-02 NOTE — ED PROVIDER NOTES
EMERGENCY DEPARTMENT HISTORY AND PHYSICAL EXAM      Date: 3/2/2022  Patient Name: Jennyfer Thomas      History of Presenting Illness     Chief Complaint   Patient presents with    Shortness of Breath       History Provided By: Patient    HPI: Jennyfer Thomas, 81 y.o. male with a past medical history significant hypertension, myocardial infarction and GERD atrial fibrillation on Plavix presents to the ED with cc of orthopnea. Patient states that this morning he woke up and felt very short of breath. Is worse when he lies flat or exerts himself. EMS was called and his room air saturation was in the 50s. He improved 100% on nonrebreather. States that he feels similar to when he had a heart attack previously. This was at [facility]. Denies any recent illnesses or known sick contacts. There are no other complaints, changes, or physical findings at this time.     PCP: Savannah Santiago MD    Current Facility-Administered Medications   Medication Dose Route Frequency Provider Last Rate Last Admin    aspirin tablet 325 mg  325 mg Oral ONCE Marvel Anderson MD        nitroglycerin (NITROSTAT) tablet 0.4 mg  0.4 mg SubLINGual Q5MIN PRN Coco RIGGINS MD   0.4 mg at 03/02/22 3614    lisinopriL (PRINIVIL, ZESTRIL) tablet 5 mg  5 mg Oral DAILY Coco RIGGINS MD   5 mg at 03/02/22 1400    carvediloL (COREG) tablet 3.125 mg  3.125 mg Oral BID WITH MEALS Coco RIGGINS MD        ondansetron [facility]) injection 4 mg  4 mg IntraVENous Q6H PRN Joanna Hussein MD        acetaminophen (TYLENOL) tablet 650 mg  650 mg Oral Q6H PRN Joanna Hussein MD        furosemide (LASIX) injection 40 mg  40 mg IntraVENous BID Coco RIGGINS MD        nitroglycerin (NITROBID) 2 % ointment 1 Inch  1 Inch Topical Q6H PRN Joanna Hussein MD        docusate sodium (COLACE) capsule 100 mg  100 mg Oral PRN Joanna Hussein MD        methylPREDNISolone (PF) (SOLU-MEDROL) injection 40 mg  40 mg IntraVENous Q8H Abel Sen Shaggy Javier MD   40 mg at 03/02/22 1400    guaiFENesin ER (MUCINEX) tablet 600 mg  600 mg Oral Q12H Marvel Douglas MD   600 mg at 03/02/22 1400    azithromycin (ZITHROMAX) 500 mg in 0.9% sodium chloride 250 mL (VIAL-MATE)  500 mg IntraVENous Q24H Jonathon RIGGINS  mL/hr at 03/02/22 1357 500 mg at 03/02/22 1357    nicotine (NICODERM CQ) 21 mg/24 hr patch 1 Patch  1 Patch TransDERmal DAILY PRN Briseida Ansari MD        [START ON 3/3/2022] aspirin delayed-release tablet 81 mg  81 mg Oral DAILY Marvel Douglas MD        albuterol-ipratropium (DUO-NEB) 2.5 MG-0.5 MG/3 ML  3 mL Nebulization Q6H RT Jonathon RIGGINS MD   3 mL at 03/02/22 1445    albuterol (PROVENTIL VENTOLIN) nebulizer solution 1.25 mg  1.25 mg Nebulization Q4H PRN Briseida Ansari MD        budesonide-formoteroL (SYMBICORT) 160-4.5 mcg/actuation HFA inhaler 2 Puff  2 Puff Inhalation BID MD Israel Hallman ON 3/3/2022] pantoprazole (PROTONIX) tablet 40 mg  40 mg Oral DAILY Marvel Douglas MD        traZODone (DESYREL) tablet 50 mg  50 mg Oral QHS PRN Briseida Ansari MD         Current Outpatient Medications   Medication Sig Dispense Refill    albuterol (PROVENTIL HFA, VENTOLIN HFA, PROAIR HFA) 90 mcg/actuation inhaler Take 2 Puffs by inhalation every four (4) hours as needed for Wheezing or Shortness of Breath. 18 g 1    budesonide-formoteroL (SYMBICORT) 160-4.5 mcg/actuation HFAA Take 2 Puffs by inhalation two (2) times a day. 10.2 g 1    famotidine (PEPCID) 20 mg tablet Take 1 Tablet by mouth two (2) times a day. 60 Tablet 0    furosemide (LASIX) 40 mg tablet Lasix 40 mg daily 30 Tablet 0    predniSONE (DELTASONE) 10 mg tablet 2 tablets daily 20 Tablet 0    pantoprazole (PROTONIX) 40 mg tablet Take 1 Tablet by mouth daily. 60 Tablet 0    amoxicillin-clavulanate (Augmentin) 875-125 mg per tablet Take 1 Tablet by mouth two (2) times a day.  20 Tablet 0    traZODone (DESYREL) 50 mg tablet Take 1 Tablet by mouth nightly as needed. Past History     Past Medical History:  Past Medical History:   Diagnosis Date    GERD (gastroesophageal reflux disease)     Hypertension        Past Surgical History:  No past surgical history on file. Family History:  No family history on file. Social History:  Social History     Tobacco Use    Smoking status: Current Every Day Smoker     Packs/day: 2.00    Smokeless tobacco: Never Used   Substance Use Topics    Alcohol use: Not on file    Drug use: Not on file       Allergies:  No Known Allergies      Review of Systems     Review of Systems   Unable to perform ROS: Severe respiratory distress       Physical Exam     Physical Exam  Vitals and nursing note reviewed. Constitutional:       General: He is in acute distress. Appearance: Normal appearance. He is ill-appearing. HENT:      Head: Normocephalic and atraumatic. Eyes:      Pupils: Pupils are equal, round, and reactive to light. Cardiovascular:      Rate and Rhythm: Normal rate and regular rhythm. Pulses: Normal pulses. Pulmonary:      Effort: Tachypnea, accessory muscle usage and respiratory distress present. Breath sounds: No decreased breath sounds or wheezing. Comments: Crackles throughout  Chest:      Chest wall: No tenderness. Musculoskeletal:         General: No swelling or deformity. Skin:     General: Skin is dry. Coloration: Skin is not pale. Findings: No rash. Neurological:      General: No focal deficit present. Mental Status: He is alert and oriented to person, place, and time. Cranial Nerves: No cranial nerve deficit. Motor: No weakness.    Psychiatric:         Mood and Affect: Mood normal.         Behavior: Behavior normal.         Lab and Diagnostic Study Results     Labs -     Recent Results (from the past 12 hour(s))   CBC WITH AUTOMATED DIFF    Collection Time: 03/02/22  8:04 AM   Result Value Ref Range    WBC 14.6 (H) 4.1 - 11.1 K/uL    RBC 4.03 (L) 4.10 - 5.70 M/uL    HGB 7.8 (L) 12.1 - 17.0 g/dL    HCT 28.3 (L) 36.6 - 50.3 %    MCV 70.2 (L) 80.0 - 99.0 FL    MCH 19.4 (L) 26.0 - 34.0 PG    MCHC 27.6 (L) 30.0 - 36.5 g/dL    RDW 19.8 (H) 11.5 - 14.5 %    PLATELET 408 513 - 187 K/uL    MPV 10.5 8.9 - 12.9 FL    NRBC 0.0 0.0  WBC    ABSOLUTE NRBC 0.00 0.00 - 0.01 K/uL    NEUTROPHILS 81 (H) 32 - 75 %    LYMPHOCYTES 8 (L) 12 - 49 %    MONOCYTES 7 5 - 13 %    EOSINOPHILS 2 0 - 7 %    BASOPHILS 1 0 - 1 %    IMMATURE GRANULOCYTES 1 (H) 0 - 0.5 %    ABS. NEUTROPHILS 12.0 (H) 1.8 - 8.0 K/UL    ABS. LYMPHOCYTES 1.2 0.8 - 3.5 K/UL    ABS. MONOCYTES 1.0 0.0 - 1.0 K/UL    ABS. EOSINOPHILS 0.2 0.0 - 0.4 K/UL    ABS. BASOPHILS 0.1 0.0 - 0.1 K/UL    ABS. IMM. GRANS. 0.1 (H) 0.00 - 0.04 K/UL    DF AUTOMATED     METABOLIC PANEL, COMPREHENSIVE    Collection Time: 03/02/22  8:04 AM   Result Value Ref Range    Sodium 140 136 - 145 mmol/L    Potassium 3.8 3.5 - 5.1 mmol/L    Chloride 109 (H) 97 - 108 mmol/L    CO2 25 21 - 32 mmol/L    Anion gap 6 5 - 15 mmol/L    Glucose 162 (H) 65 - 100 mg/dL    BUN 14 6 - 20 mg/dL    Creatinine 0.95 0.70 - 1.30 mg/dL    BUN/Creatinine ratio 15 12 - 20      GFR est AA >60 >60 ml/min/1.73m2    GFR est non-AA >60 >60 ml/min/1.73m2    Calcium 8.5 8.5 - 10.1 mg/dL    Bilirubin, total 0.5 0.2 - 1.0 mg/dL    AST (SGOT) 13 (L) 15 - 37 U/L    ALT (SGPT) 15 12 - 78 U/L    Alk.  phosphatase 85 45 - 117 U/L    Protein, total 7.0 6.4 - 8.2 g/dL    Albumin 3.5 3.5 - 5.0 g/dL    Globulin 3.5 2.0 - 4.0 g/dL    A-G Ratio 1.0 (L) 1.1 - 2.2     NT-PRO BNP    Collection Time: 03/02/22  8:04 AM   Result Value Ref Range    NT pro-BNP 1,823 (H) <450 pg/mL   TROPONIN-HIGH SENSITIVITY    Collection Time: 03/02/22  8:04 AM   Result Value Ref Range    Troponin-High Sensitivity 17 0 - 76 ng/L   COVID-19 RAPID TEST    Collection Time: 03/02/22  8:04 AM   Result Value Ref Range    COVID-19 rapid test Not Detected Not Detected     PTT Collection Time: 03/02/22  8:04 AM   Result Value Ref Range    aPTT 32.1 21.2 - 34.1 sec    aPTT, therapeutic range   82 - 109 sec   PROTHROMBIN TIME + INR    Collection Time: 03/02/22  8:04 AM   Result Value Ref Range    Prothrombin time 14.4 11.9 - 14.6 sec    INR 1.1 0.9 - 1.1     EKG, 12 LEAD, INITIAL    Collection Time: 03/02/22  8:10 AM   Result Value Ref Range    Ventricular Rate 91 BPM    Atrial Rate 94 BPM    QRS Duration 86 ms    Q-T Interval 366 ms    QTC Calculation (Bezet) 450 ms    Calculated R Axis 30 degrees    Calculated T Axis 51 degrees    Diagnosis       Atrial fibrillation  Low voltage QRS  Borderline ECG  When compared with ECG of 02-MAR-2022 07:54, (Unconfirmed)  No significant change was found  Confirmed by Dina Juarez MD, Obed Wilkerson ([id number]) on 3/2/2022 11:03:48 AM     PROCALCITONIN    Collection Time: 03/02/22  9:45 AM   Result Value Ref Range    Procalcitonin <0.05 (H) 0 ng/mL       Radiologic Studies -   [unfilled]  CT Results  (Last 48 hours)               03/02/22 1005  CT CHEST WO CONT Final result    Impression:  Pleural effusions and associated compressive atelectasis. Hydrostatic edema suspected. Other findings as above. Narrative:  CT chest, 3/2/2022       History: Pneumonia. Comparison: Including chest radiograph, same day. Technique: No intravenous contrast was administered. Multiple contiguous axial   images were acquired from the thoracic inlet to the diaphragm. Sagittal,   coronal and axial MIP reconstruction of images was made. All CT scans at this facility are performed using dose reduction optimization   techniques as appropriate to perform the exam including the following: Automated   exposure control, adjustments of the mA and/or kV according to patient size, or   use iterative reconstruction technique. Findings: The thyroid gland has no focal abnormality. Mediastinal lymph nodes are noted measuring up to 1.2 cm in short axis. Evaluation of the radha is limited on this noncontrast enhanced study. The heart is borderline enlarged. Visualization of the interventricular septum   suggests underlying anemia. Clinical correlation is recommended. Coronary   artery calcifications are seen. Small pericardial effusion is noted. The thoracic aorta has atherosclerotic plaque and measures 2.9 cm in the distal   arch. The main pulmonary artery is 3.0 cm. Calcified pleural plaque is seen. Moderate right and small-to-moderate left pleural effusions are present. Associated compressive atelectasis in the lower lobes is seen. Milder degree of   atelectasis is seen in the lingula and dependent upper lobes. There are groundglass opacities in the lungs. Hydrostatic edema is suspected. Superimposed infection may or may not be present. Calcified granulomas are noted. A 5 mm right-sided perifissural nodule is   benign appearing. Nonacute appearing rib deformities are noted. Degenerative changes are present   in the thoracic spine. The included liver, spleen show no gross abnormality on this noncontrast   enhanced study. The included adrenal glands appear thickened. CXR Results  (Last 48 hours)               03/02/22 0823  XR CHEST PORT Final result    Narrative:  Chest single view. Comparison single view chest November 15, 2021. Patchy reticular markings through lungs. These are most dense through lung   bases. Obscuration each hemidiaphragm. Moderate volume dependent right pleural   effusion and small to moderate volume dependent left pleural effusion. Cardiac   and mediastinal structures unchanged. No pneumothorax. Findings advanced compared to prior imaging. Medical Decision Making and ED Course   - I am the first and primary provider for this patient AND AM THE PRIMARY PROVIDER OF RECORD.     - I reviewed the vital signs, available nursing notes, past medical history, past surgical history, family history and social history. - Initial assessment performed. The patients presenting problems have been discussed, and the staff are in agreement with the care plan formulated and outlined with them. I have encouraged them to ask questions as they arise throughout their visit. Vital Signs-Reviewed the patient's vital signs. Patient Vitals for the past 12 hrs:   Temp Pulse Resp BP SpO2   03/02/22 1400  72  (!) 153/73    03/02/22 0915  69 18 (!) 147/79 100 %   03/02/22 0816     100 %   03/02/22 0752 96.8 °F (36 °C) 95 17 (!) 158/66 100 %         Records Reviewed: Nursing Notes      ED Course:       ED Course as of 03/02/22 1537   Wed Mar 02, 2022   [age]year-old male presents for evaluation of shortness of breath. States that he had an MI 1 month ago. He has CHF but is not on any diuretics. Work-up with significant orthopnea and dyspnea on exertion this morning. Room air saturations for EMS were in the 50s. He improved to 100% with nonrebreather. Does not have an oxygen requirement at home. States he has just chest discomfort. Differential diagnosis includes ACS versus CHF versus volume overload versus flash pulmonary edema versus pneumonia versus viral syndrome including COVID-19. The CBC, CMP, BMP, troponin x2, Covid test as well as a chest x-ray. Disposition as per clinical course. Giving furosemide, sublingual nitro and aspirin. [LW]   0850 Initial EKG performed at 07 54, read at 07 59. A. fib with a rate of 95. Normal QRS normal QTC. Significant artifact, will repeat    P ECG performed at [address], read at [facility] 23. A. fib rate is 91. Normal QRS, normal QTC. [LW]      ED Course User Index  [LW] Ramo Roberson MD     Patient admitted to Dr. Julianne Lee. Consultations:       Consultations: -  Hospitalist Consultant: Dr. Julianne Lee : We have asked for emergent assistance with regard to this patient.  We have discussed the patients HPI, ROS, PE and results this far. They will come and evaluate the patient for admission. Disposition     Disposition: Admitted to Floor Medical Floor the case was discussed with the admitting physician Paco. Admitted      Diagnosis     Clinical Impression:   1. Acute on chronic combined systolic and diastolic congestive heart failure ([address]        Attestations:    Mukund Saldana MD    Please note that this dictation was completed with [employer], the computer voice recognition software. Quite often unanticipated grammatical, syntax, homophones, and other interpretive errors are inadvertently transcribed by the computer software. Please disregard these errors. Please excuse any errors that have escaped final proofreading. Thank you.

## 2022-03-02 NOTE — PROGRESS NOTES
Reason for Admission:  Respiratory Failure                     RUR Score: 12%               Plan for utilizing home health: None @ this time/uses cane @ times. PCP: First and Last name:  Pippa Presley MD     Name of Practice:    Are you a current patient: Yes/No: Yes   Approximate date of last visit: Seen in Feb 2022. Can you participate in a virtual visit with your PCP: No                    Current Advanced Directive/Advance Care Plan: Prior      Healthcare Decision Maker:             Primary Decision Maker: Gurwinder Oleary ( Owner/Caregiver) - Caregiver - [phone]                  Transition of Care Plan:  D/C Plan is to return to Residential/Grp Home & caregiver will transport home upon discharge.

## 2022-03-03 ENCOUNTER — APPOINTMENT (OUTPATIENT)
Dept: GENERAL RADIOLOGY | Age: 82
DRG: 291 | End: 2022-03-03
Attending: INTERNAL MEDICINE
Payer: MEDICARE

## 2022-03-03 LAB
ANION GAP SERPL CALC-SCNC: 6 MMOL/L (ref 5–15)
ATRIAL RATE: 76 BPM
ATRIAL RATE: 85 BPM
BASOPHILS # BLD: 0 K/UL (ref 0–0.1)
BASOPHILS NFR BLD: 0 % (ref 0–1)
BUN SERPL-MCNC: 20 MG/DL (ref 6–20)
BUN/CREAT SERPL: 19 (ref 12–20)
CA-I BLD-MCNC: 9.2 MG/DL (ref 8.5–10.1)
CALCULATED R AXIS, ECG10: 1 DEGREES
CALCULATED R AXIS, ECG10: 25 DEGREES
CALCULATED T AXIS, ECG11: 22 DEGREES
CALCULATED T AXIS, ECG11: 42 DEGREES
CHLORIDE SERPL-SCNC: 106 MMOL/L (ref 97–108)
CHOLEST SERPL-MCNC: 126 MG/DL
CO2 SERPL-SCNC: 25 MMOL/L (ref 21–32)
CREAT SERPL-MCNC: 1.03 MG/DL (ref 0.7–1.3)
DIAGNOSIS, 93000: NORMAL
DIAGNOSIS, 93000: NORMAL
DIFFERENTIAL METHOD BLD: ABNORMAL
EOSINOPHIL # BLD: 0 K/UL (ref 0–0.4)
EOSINOPHIL NFR BLD: 0 % (ref 0–7)
ERYTHROCYTE [DISTWIDTH] IN BLOOD BY AUTOMATED COUNT: 19.9 % (ref 11.5–14.5)
GLUCOSE SERPL-MCNC: 165 MG/DL (ref 65–100)
HCT VFR BLD AUTO: 26.6 % (ref 36.6–50.3)
HDLC SERPL-MCNC: 66 MG/DL
HDLC SERPL: 1.9 {RATIO} (ref 0–5)
HGB BLD-MCNC: 7.5 G/DL (ref 12.1–17)
IMM GRANULOCYTES # BLD AUTO: 0 K/UL (ref 0–0.04)
IMM GRANULOCYTES NFR BLD AUTO: 0 % (ref 0–0.5)
LDLC SERPL CALC-MCNC: 51.8 MG/DL (ref 0–100)
LIPID PROFILE,FLP: NORMAL
LYMPHOCYTES # BLD: 0.5 K/UL (ref 0.8–3.5)
LYMPHOCYTES NFR BLD: 7 % (ref 12–49)
MAGNESIUM SERPL-MCNC: 2 MG/DL (ref 1.6–2.4)
MCH RBC QN AUTO: 19.3 PG (ref 26–34)
MCHC RBC AUTO-ENTMCNC: 28.2 G/DL (ref 30–36.5)
MCV RBC AUTO: 68.6 FL (ref 80–99)
MONOCYTES # BLD: 0.3 K/UL (ref 0–1)
MONOCYTES NFR BLD: 4 % (ref 5–13)
NEUTS SEG # BLD: 6.5 K/UL (ref 1.8–8)
NEUTS SEG NFR BLD: 89 % (ref 32–75)
NRBC # BLD: 0 K/UL (ref 0–0.01)
NRBC BLD-RTO: 0 PER 100 WBC
PLATELET # BLD AUTO: 284 K/UL (ref 150–400)
PMV BLD AUTO: 10.7 FL (ref 8.9–12.9)
POTASSIUM SERPL-SCNC: 3.5 MMOL/L (ref 3.5–5.1)
Q-T INTERVAL, ECG07: 354 MS
Q-T INTERVAL, ECG07: 374 MS
QRS DURATION, ECG06: 82 MS
QRS DURATION, ECG06: 90 MS
QTC CALCULATION (BEZET), ECG08: 439 MS
QTC CALCULATION (BEZET), ECG08: 444 MS
RBC # BLD AUTO: 3.88 M/UL (ref 4.1–5.7)
SODIUM SERPL-SCNC: 137 MMOL/L (ref 136–145)
TRIGL SERPL-MCNC: 41 MG/DL (ref ?–150)
TROPONIN-HIGH SENSITIVITY: 24 NG/L (ref 0–76)
VENTRICULAR RATE, ECG03: 83 BPM
VENTRICULAR RATE, ECG03: 95 BPM
VLDLC SERPL CALC-MCNC: 8.2 MG/DL
WBC # BLD AUTO: 7.4 K/UL (ref 4.1–11.1)

## 2022-03-03 PROCEDURE — 85025 COMPLETE CBC W/AUTO DIFF WBC: CPT

## 2022-03-03 PROCEDURE — 65270000029 HC RM PRIVATE

## 2022-03-03 PROCEDURE — 84484 ASSAY OF TROPONIN QUANT: CPT

## 2022-03-03 PROCEDURE — 97165 OT EVAL LOW COMPLEX 30 MIN: CPT

## 2022-03-03 PROCEDURE — 77010033678 HC OXYGEN DAILY

## 2022-03-03 PROCEDURE — 74011250637 HC RX REV CODE- 250/637: Performed by: INTERNAL MEDICINE

## 2022-03-03 PROCEDURE — 97530 THERAPEUTIC ACTIVITIES: CPT

## 2022-03-03 PROCEDURE — 71045 X-RAY EXAM CHEST 1 VIEW: CPT

## 2022-03-03 PROCEDURE — 74011000250 HC RX REV CODE- 250: Performed by: INTERNAL MEDICINE

## 2022-03-03 PROCEDURE — 80061 LIPID PANEL: CPT

## 2022-03-03 PROCEDURE — 93005 ELECTROCARDIOGRAM TRACING: CPT

## 2022-03-03 PROCEDURE — 94640 AIRWAY INHALATION TREATMENT: CPT

## 2022-03-03 PROCEDURE — 83735 ASSAY OF MAGNESIUM: CPT

## 2022-03-03 PROCEDURE — 80048 BASIC METABOLIC PNL TOTAL CA: CPT

## 2022-03-03 PROCEDURE — 97161 PT EVAL LOW COMPLEX 20 MIN: CPT

## 2022-03-03 PROCEDURE — 74011250636 HC RX REV CODE- 250/636: Performed by: INTERNAL MEDICINE

## 2022-03-03 PROCEDURE — 94760 N-INVAS EAR/PLS OXIMETRY 1: CPT

## 2022-03-03 PROCEDURE — 36415 COLL VENOUS BLD VENIPUNCTURE: CPT

## 2022-03-03 RX ORDER — POTASSIUM CHLORIDE 750 MG/1
40 TABLET, FILM COATED, EXTENDED RELEASE ORAL
Status: COMPLETED | OUTPATIENT
Start: 2022-03-03 | End: 2022-03-03

## 2022-03-03 RX ORDER — IPRATROPIUM BROMIDE AND ALBUTEROL SULFATE 2.5; .5 MG/3ML; MG/3ML
3 SOLUTION RESPIRATORY (INHALATION)
Status: DISCONTINUED | OUTPATIENT
Start: 2022-03-03 | End: 2022-03-04

## 2022-03-03 RX ADMIN — AZITHROMYCIN MONOHYDRATE 500 MG: 500 INJECTION, POWDER, LYOPHILIZED, FOR SOLUTION INTRAVENOUS at 12:44

## 2022-03-03 RX ADMIN — FUROSEMIDE 40 MG: 10 INJECTION, SOLUTION INTRAMUSCULAR; INTRAVENOUS at 23:02

## 2022-03-03 RX ADMIN — POTASSIUM CHLORIDE 40 MEQ: 750 TABLET, FILM COATED, EXTENDED RELEASE ORAL at 23:02

## 2022-03-03 RX ADMIN — IPRATROPIUM BROMIDE AND ALBUTEROL SULFATE 3 ML: .5; 3 SOLUTION RESPIRATORY (INHALATION) at 20:00

## 2022-03-03 RX ADMIN — PANTOPRAZOLE SODIUM 40 MG: 40 TABLET, DELAYED RELEASE ORAL at 08:59

## 2022-03-03 RX ADMIN — LISINOPRIL 5 MG: 5 TABLET ORAL at 08:59

## 2022-03-03 RX ADMIN — IPRATROPIUM BROMIDE AND ALBUTEROL SULFATE 3 ML: .5; 3 SOLUTION RESPIRATORY (INHALATION) at 07:36

## 2022-03-03 RX ADMIN — FUROSEMIDE 40 MG: 10 INJECTION, SOLUTION INTRAMUSCULAR; INTRAVENOUS at 09:00

## 2022-03-03 RX ADMIN — GUAIFENESIN 600 MG: 600 TABLET, EXTENDED RELEASE ORAL at 09:00

## 2022-03-03 RX ADMIN — ACETAMINOPHEN 650 MG: 325 TABLET ORAL at 08:59

## 2022-03-03 RX ADMIN — CARVEDILOL 3.12 MG: 3.12 TABLET, FILM COATED ORAL at 17:49

## 2022-03-03 RX ADMIN — TRAZODONE HYDROCHLORIDE 50 MG: 50 TABLET ORAL at 23:02

## 2022-03-03 RX ADMIN — METHYLPREDNISOLONE SODIUM SUCCINATE 40 MG: 40 INJECTION, POWDER, FOR SOLUTION INTRAMUSCULAR; INTRAVENOUS at 13:27

## 2022-03-03 RX ADMIN — GUAIFENESIN 600 MG: 600 TABLET, EXTENDED RELEASE ORAL at 23:02

## 2022-03-03 RX ADMIN — BUDESONIDE AND FORMOTEROL FUMARATE DIHYDRATE 2 PUFF: 160; 4.5 AEROSOL RESPIRATORY (INHALATION) at 20:00

## 2022-03-03 RX ADMIN — BUDESONIDE AND FORMOTEROL FUMARATE DIHYDRATE 2 PUFF: 160; 4.5 AEROSOL RESPIRATORY (INHALATION) at 08:00

## 2022-03-03 RX ADMIN — IPRATROPIUM BROMIDE AND ALBUTEROL SULFATE 3 ML: .5; 3 SOLUTION RESPIRATORY (INHALATION) at 13:45

## 2022-03-03 RX ADMIN — METHYLPREDNISOLONE SODIUM SUCCINATE 40 MG: 40 INJECTION, POWDER, FOR SOLUTION INTRAMUSCULAR; INTRAVENOUS at 05:43

## 2022-03-03 RX ADMIN — CARVEDILOL 3.12 MG: 3.12 TABLET, FILM COATED ORAL at 08:59

## 2022-03-03 RX ADMIN — IPRATROPIUM BROMIDE AND ALBUTEROL SULFATE 3 ML: .5; 3 SOLUTION RESPIRATORY (INHALATION) at 01:09

## 2022-03-03 RX ADMIN — ASPIRIN 81 MG: 81 TABLET, COATED ORAL at 08:59

## 2022-03-03 NOTE — CONSULTS
Consult  Pulmonary, Critical Care    Name: Bret Rodriguez MRN: [id number]   : 1940 Hospital: [address] [place] [address]   Date: 3/3/2022  Admission date: 3/2/2022 Hospital Day: 2       Subjective/Interval History:   Seen on the medical floor. Was admitted yesterday with shortness of breath peripheral edema states he feels much better today. Has a jug of water in front of him states he is trying to flush all excess water out. Has not had any fever chills or sweats sputum that he coughs up is relatively clear. He was a smoker of a pack a day from age of 7 until about 3 months ago he states he hasn't smoked since then    Hospital Problems  Date Reviewed: 2021          Codes Class Noted POA    Acute respiratory failure with hypoxia [facility]) ICD-10-CM: J96.01  ICD-9-CM: 518.81  3/2/2022 Unknown              IMPRESSION:   1. Acute hypoxic respiratory failure  2. Pulmonary edema  3. Diastolic left ventricular dysfunction  4. Possible underlying COPD  5. Anemia  6. Atrial fibrillation  7. Pulmonary hypertension  8. Probable  9. Body mass index is 22.32 kg/m². 10.       RECOMMENDATIONS/PLAN:   1. I hear no wheezing will discontinue Solu-Medrol  2. Continue nebulized albuterol Atrovent for the time being  3. Agree IV Lasix twice daily  4. Follow renal function and potassium  5. Taper oxygen as he improves  6. Have asked him to drink to thirst without trying to take excess water  7. Have encouraged him to remain off cigarettes          [x] High complexity decision making was performed  [x] See my orders for details      Subjective/Initial History:     I was asked by Landon Kapoor MD to see Bret Rodriguez  a 81 y.o.    male in consultation for a chief complaint of hypoxic respiratory failure pulmonary edema possible COPD      No Known Allergies     MAR reviewed and pertinent medications noted or modified as needed     Current Facility-Administered Medications   Medication    albuterol-ipratropium (DUO-NEB) 2.5 MG-0.5 MG/3 ML    nitroglycerin (NITROSTAT) tablet 0.4 mg    lisinopriL (PRINIVIL, ZESTRIL) tablet 5 mg    carvediloL (COREG) tablet 3.125 mg    ondansetron (ZOFRAN) injection 4 mg    acetaminophen (TYLENOL) tablet 650 mg    furosemide (LASIX) injection 40 mg    nitroglycerin (NITROBID) 2 % ointment 1 Inch    docusate sodium (COLACE) capsule 100 mg    guaiFENesin ER (MUCINEX) tablet 600 mg    azithromycin (ZITHROMAX) 500 mg in 0.9% sodium chloride 250 mL (VIAL-MATE)    nicotine (NICODERM CQ) 21 mg/24 hr patch 1 Patch    aspirin delayed-release tablet 81 mg    albuterol (PROVENTIL VENTOLIN) nebulizer solution 1.25 mg    budesonide-formoteroL (SYMBICORT) 160-4.5 mcg/actuation HFA inhaler 2 Puff    pantoprazole (PROTONIX) tablet 40 mg    traZODone (DESYREL) tablet 50 mg      Patient PCP: Marito Yi MD  PMH:  has a past medical history of GERD (gastroesophageal reflux disease) and Hypertension. PSH:   has no past surgical history on file. FHX: family history is not on file. SHX:  reports that he has been smoking. He tells me he hasn't smoked in 3-month he has been smoking about 2.00 packs per day.  He has never used smokeless tobacco.     Systemic review:  General no weight loss fever chills or sweats   Eyes no double vision no momentary loss of vision  ENT no facial pain over the sinuses or drainage  Endocrinologic no polyuria polydipsia  Musculoskeletal no swollen tender joints he did notice worsening edema for the last month  Neurologic no seizures or syncope  Gastrointestinal no nausea vomiting acid indigestion sour bitter taste  Genitourinary no pain or discomfort on urination no bleeding  Cardiovascular history of heart disease had a stent in the past denies chest pain diaphoresis  Respiratory K[place] clear sputum no fever chills or sweats was a smoker states he stopped 3 months ago    Objective:     Vital Signs: Telemetry:    AFIB Intake/Output: Visit Vitals  BP (!) 145/71 (BP 1 Location: Left upper arm, BP Patient Position: At rest;Supine)   Pulse 72   Temp 98.7 °F (37.1 °C)   Resp 18   Ht 5' 11\" (1.803 m)   Wt 72.6 kg (160 lb)   SpO2 97%   BMI 22.32 kg/m²       Temp (24hrs), Av.1 °F (36.7 °C), Min:97.5 °F (36.4 °C), Max:98.7 °F (37.1 °C)        O2 Device: Nasal cannula O2 Flow Rate (L/min): 4 l/min       Wt Readings from Last 4 Encounters:   22 72.6 kg (160 lb)   21 80.1 kg (176 lb 9.4 oz)          Intake/Output Summary (Last 24 hours) at 3/3/2022 1522  Last data filed at 3/3/2022 1249  Gross per 24 hour   Intake 420 ml   Output 2375 ml   Net -1955 ml       Last shift:       07 -  1900  In: -   Out: 450 [Urine:450]  Last 3 shifts: 1901 -  0700  In: 420 [P.O.:420]  Out: 3125 [Urine:3125]       Physical Exam:   General:  male; lying in bed no distress no accessory muscle use on nasal oxygen  HEENT: NCAT,   Eyes: anicteric; conjunctiva clear extraocular movements intact  Neck: no nodes, neck veins are visible with him laying at about 30 degrees, no accessory MM use. Chest: no deformity,  Cardiac: Irregular rhythm rate controlled no chest pain  Lungs: Clear anterior and laterally with rales present posteriorly lateral and lower lung looks posteriorly  Abd: Soft nontender normal bowel sounds  Ext: 1+ edema; no joint swelling;  No clubbing  :clear urine  Neuro: Awake alert oriented speech is clear moves all 4 extremities  Psych- no agitation, oriented to person;  Skin: warm, dry, no cyanosis;   Pulses: Brachial and radial pulses intact  Capillary: Normal capillary refill    Labs:    Recent Labs     22  0637 22  0804   WBC 7.4 14.6*   HGB 7.5* 7.8*    306   INR  --  1.1   APTT  --  32.1     Recent Labs     22  0637 22  0804    140   K 3.5 3.8    109*   CO2 25 25   * 162*   BUN 20 14   CREA 1.03 0.95   CA 9.2 8.5   MG 2.0  --    ALB  --  3.5   ALT  --  15 Procalcitonin less than 0.05  BNP 1823  COVID-19 antigen negative  11/18/2021 echo ejection fraction 95% grade 2 diastolic dysfunction RVSP 62 moderate mitral regurg mild to moderate aortic regurg IVC dilated    Lab Results   Component Value Date/Time    Culture result:  11/15/2021 12:00 PM     One of four bottles has been flagged positive by instrument. Bottle has been sent to [facility] laboratory to assess for possible growth. Gram Positive Cocci in clusters CALLED TO AND READ BACK BY Leena Peralta 11/18/2021 BY DPW    Culture result:  11/15/2021 12:00 PM     Staphylococcus species, coagulase negative GROWING IN THE ANAEROBIC BOTTLE NO SITE INDICATED     Imaging:    CXR Results  (Last 48 hours)               03/03/22 1138  XR CHEST PORT Final result    Narrative:  1 view comparison yesterday       Minimal improvement of congestion and edema. Right pleural effusion may be   improved as well. No pneumothorax       03/02/22 0823  XR CHEST PORT Final result    Narrative:  Chest single view. Comparison single view chest November 15, 2021. Patchy reticular markings through lungs. These are most dense through lung   bases. Obscuration each hemidiaphragm. Moderate volume dependent right pleural   effusion and small to moderate volume dependent left pleural effusion. Cardiac   and mediastinal structures unchanged. No pneumothorax. Findings advanced compared to prior imaging. Results from [place] encounter on 03/02/22    XR CHEST PORT    Narrative  1 view comparison yesterday    Minimal improvement of congestion and edema. Right pleural effusion may be  improved as well. No pneumothorax      XR CHEST PORT    Narrative  Chest single view. Comparison single view chest November 15, 2021. Patchy reticular markings through lungs. These are most dense through lung  bases. Obscuration each hemidiaphragm.  Moderate volume dependent right pleural  effusion and small to moderate volume dependent left pleural effusion. Cardiac  and mediastinal structures unchanged. No pneumothorax. Findings advanced compared to prior imaging. Results from [place] encounter on 11/15/21    XR CHEST SNGL V    Narrative  Portable upright radiograph chest 10:02 a.m.. No prior study. INDICATION: Shortness of breath. Heart size is at the upper limits of normal.. There are bilateral pleural  effusions with bilateral diffuse airspace disease or edema. Multiple right rib  fractures. No pneumothorax. Degenerative changes of the shoulders and spine. Impression  1. Bilateral pleural effusions with bilateral airspace disease or edema. 2. Multiple right-sided rib fractures, probably old, recommend follow-up as  warranted. Results from [place] encounter on 03/02/22    CT CHEST WO CONT    Narrative  CT chest, 3/2/2022    History: Pneumonia. Comparison: Including chest radiograph, same day. Technique: No intravenous contrast was administered. Multiple contiguous axial  images were acquired from the thoracic inlet to the diaphragm. Sagittal,  coronal and axial MIP reconstruction of images was made. All CT scans at this facility are performed using dose reduction optimization  techniques as appropriate to perform the exam including the following: Automated  exposure control, adjustments of the mA and/or kV according to patient size, or  use iterative reconstruction technique. Findings: The thyroid gland has no focal abnormality. Mediastinal lymph nodes are noted measuring up to 1.2 cm in short axis. Evaluation of the radha is limited on this noncontrast enhanced study. The heart is borderline enlarged. Visualization of the interventricular septum  suggests underlying anemia. Clinical correlation is recommended. Coronary  artery calcifications are seen. Small pericardial effusion is noted.     The thoracic aorta has atherosclerotic plaque and measures 2.9 cm in the distal  arch. The main pulmonary artery is 3.0 cm. Calcified pleural plaque is seen. Moderate right and small-to-moderate left pleural effusions are present. Associated compressive atelectasis in the lower lobes is seen. Milder degree of  atelectasis is seen in the lingula and dependent upper lobes. There are groundglass opacities in the lungs. Hydrostatic edema is suspected. Superimposed infection may or may not be present. Calcified granulomas are noted. A 5 mm right-sided perifissural nodule is  benign appearing. Nonacute appearing rib deformities are noted. Degenerative changes are present  in the thoracic spine. The included liver, spleen show no gross abnormality on this noncontrast  enhanced study. The included adrenal glands appear thickened. Impression  Pleural effusions and associated compressive atelectasis. Hydrostatic edema suspected. Other findings as above. Discussion: Acute pulmonary edema secondary to diastolic dysfunction and pulmonary hypertension with cor pulmonale. Good diuresis and the chest x-ray minimally improved. With continue Lasix.   No wheezing will discontinue Solu-Medrol continue nebulized albuterol and Atrovent for the time being

## 2022-03-03 NOTE — CONSULTS
Consult    Patient: Coco Whitney MRN: [id number]  SSN: [id number]    YOB: 1940  Age: 81 y.o. Sex: male      Subjective:      Coco Whitney is a 81 y.o. male who is being seen for heart failure. Patient is an 61-year-old white male with history of hypertension, GERD. Alcohol abuse. Paroxysmal atrial fibrillation, nicotine dependence, CAD status post PCI, hyperlipidemia and history of falls. He presented to the emergency room yesterday complaining of shortness of breath, some back pain. Lower extremity swelling. He has some occasional dizziness without syncope. He has not fallen since discharge last time back in November. This morning he feels back to baseline. Reportedly his room air saturation was 50%. On a nonrebreather mask his sats improved to 100%. Did not have any chest pain chest tightness, nausea, vomiting or excessive sweating. Anyhow he was having some left shoulder pain radiating to the right shoulder. Denied having any chills but he had a low-grade temperature. Last visit he received blood transfusion. He had an EGD that showed gastritis without bleeding. Past Medical History:   Diagnosis Date    GERD (gastroesophageal reflux disease)     Hypertension      No past surgical history on file. No family history on file.   Social History     Tobacco Use    Smoking status: Current Every Day Smoker     Packs/day: 2.00    Smokeless tobacco: Never Used   Substance Use Topics    Alcohol use: Not on file      Current Facility-Administered Medications   Medication Dose Route Frequency Provider Last Rate Last Admin    nitroglycerin (NITROSTAT) tablet 0.4 mg  0.4 mg SubLINGual Q5MIN PRN Desiree RIGGINS MD   0.4 mg at 03/02/22 0824    lisinopriL (PRINIVIL, ZESTRIL) tablet 5 mg  5 mg Oral DAILY Desiree RIGGINS MD   5 mg at 03/03/22 0859    carvediloL (COREG) tablet 3.125 mg  3.125 mg Oral BID WITH MEALS Desiree RIGGINS MD   3.125 mg at 03/03/22 0859    ondansetron (Jose Trina) injection 4 mg  4 mg IntraVENous Q6H PRN Eileen Silva MD        acetaminophen (TYLENOL) tablet 650 mg  650 mg Oral Q6H PRN Lucas RIGGINS MD   650 mg at 03/03/22 0859    furosemide (LASIX) injection 40 mg  40 mg IntraVENous BID Lucas RIGGINS MD   40 mg at 03/03/22 0900    nitroglycerin (NITROBID) 2 % ointment 1 Inch  1 Inch Topical Q6H PRN Eileen Silva MD        docusate sodium (COLACE) capsule 100 mg  100 mg Oral PRN Eileen Silva MD        methylPREDNISolone (PF) (SOLU-MEDROL) injection 40 mg  40 mg IntraVENous Q8H Marvel Marley MD   40 mg at 03/03/22 0543    guaiFENesin ER (MUCINEX) tablet 600 mg  600 mg Oral Q12H Eileen Silva MD   600 mg at 03/02/22 2131    azithromycin (ZITHROMAX) 500 mg in 0.9% sodium chloride 250 mL (VIAL-MATE)  500 mg IntraVENous Q24H Lucas RIGGINS MD   IV Completed at 03/02/22 1630    nicotine (NICODERM CQ) 21 mg/24 hr patch 1 Patch  1 Patch TransDERmal DAILY PRN Eileen Silva MD        aspirin delayed-release tablet 81 mg  81 mg Oral DAILY Marvel Marley MD   81 mg at 03/03/22 0859    albuterol-ipratropium (DUO-NEB) 2.5 MG-0.5 MG/3 ML  3 mL Nebulization Q6H RT Lucas RIGGINS MD   3 mL at 03/03/22 0736    albuterol (PROVENTIL VENTOLIN) nebulizer solution 1.25 mg  1.25 mg Nebulization Q4H PRN Eileen Silva MD        budesonide-formoteroL (SYMBICORT) 160-4.5 mcg/actuation HFA inhaler 2 Puff  2 Puff Inhalation BID Lucas RIGGINS MD   2 Puff at 03/03/22 0800    pantoprazole (PROTONIX) tablet 40 mg  40 mg Oral DAILY Lucas RIGGINS MD   40 mg at 03/03/22 0859    traZODone (DESYREL) tablet 50 mg  50 mg Oral QHS PRN Eileen Silva MD   50 mg at 03/02/22 2131        No Known Allergies    Review of Systems:  A comprehensive review of systems was negative except for that written in the History of Present Illness.     Objective:     Vitals:    03/03/22 0109 03/03/22 0440 03/03/22 0739 03/03/22 0755   BP:  (!) 150/73 137/68   Pulse:  78  74   Resp:  20  18   Temp:  97.5 °F (36.4 °C)  98.3 °F (36.8 °C)   SpO2: 95% 95% 95% 95%   Weight:       Height:            Physical Exam:  General:  Alert, cooperative, no distress, appears stated age. Eyes:  Conjunctivae/corneas clear. PERRL, EOMs intact. Fundi benign   Ears:  Normal TMs and external ear canals both ears. Nose: Nares normal. Septum midline. Mucosa normal. No drainage or sinus tenderness. Mouth/Throat: Lips, mucosa, and tongue normal. Teeth and gums normal.   Neck: Supple, symmetrical, trachea midline, no adenopathy, thyroid: no enlargment/tenderness/nodules, no carotid bruit and no JVD. Back:   Symmetric, no curvature. ROM normal. No CVA tenderness. Lungs:   Clear to auscultation bilaterally. Heart:  Regular rate and rhythm, S1, S2 normal, no murmur, click, rub or gallop. Abdomen:   Soft, non-tender. Bowel sounds normal. No masses,  No organomegaly. Extremities: Extremities normal, atraumatic, no cyanosis or edema. Pulses: 2+ and symmetric all extremities. Skin: Skin color, texture, turgor normal. No rashes or lesions   Lymph nodes: Cervical, supraclavicular, and axillary nodes normal.   Neurologic: CNII-XII intact. Normal strength, sensation and reflexes throughout. Assessment:     Hospital Problems  Date Reviewed: 11/17/2021          Codes Class Noted POA    Acute respiratory failure with hypoxia [facility]) ICD-10-CM: J96.01  ICD-9-CM: 518.81  3/2/2022 Unknown            Patient is an 13-year-old white male with:  1.  Atrial fibrillation with rapid ventricular rate  2.  Alcohol abuse  3.  Nicotine dependence  4.  Coronary artery status post PCI  5.  Hyperlipidemia  6.  Frequent falls  7.  Heart failure with preserved ejection fraction  8.  Anemia      Plan:     First of troponin was 17 and second set of troponin is 54. His creatinine is 1, BUN is 20. He is laying flat and appears to be comfortable.  Denied having any chest pain or shortness of breath this morning. BNP was elevated. Echo last visit showed EF of 6065%, moderate MR with mild to moderate AI. RVSP 62 mmHg. EKG yesterday showed atrial fibrillation with nonspecific ST-T wave changes. His creatinine 1.0, BUN is 20. Liver function test was within normal limits. His potassium is 3.5, sodium is 137. CT chest without contrast showed edema. Pleural effusion with atelectasis. Hemoglobin was 7.5. His hemoglobin back in November was 6.8. Currently on aspirin, Z-Raymundo, IV Lasix twice a day, carvedilol 3.125 mg twice a day, lisinopril. He is currently on Solu-Medrol. Close monitor ins and outs, daily weight. We will recheck troponin until it trends down. Check limited echo. Replete potassium. He is not a candidate for chronic anticoagulation due to significant anemia. Thank you  For involving me in Mr. Adriana rene. I will follow.       Signed By: Tavia Randall MD     March 3, 2022

## 2022-03-03 NOTE — PROGRESS NOTES
Hospitalist Progress Note               Daily Progress Note: 13/2022  4year-old gentleman with a history of CAD, hypertension, GERD, persistent atrial fibrillation, COPD presented to the emergency department secondary to shortness of breath. Shortness of breath woke him up on the date of presentation. Hypoxic when EMS arrived in the 50s, responded well to nonrebreather mask and brought in for evaluation. Diuresis initiated in the ED, feeling some improvement, admitted secondary to acute hypoxic respiratory failure, acute on chronic diastolic CHF with a history of at least moderate pulmonary hypertension, component of right heart failure and possible COPD exacerbation. Subjective: The patient is seen for follow  up. He is seen at bedside, he is now on nasal cannula at 4 L, maintaining adequate saturation around 94 to 96%. States that he has been diuresing well, breathing is much better, edema is improved in his legs. Denies any chest pain, wheezing. Limited echo is pending. Unknown peaked at 47, descended to 25    Cardiology and pulmonology appreciated, diuresis discontinued. Held off on Solu-Medrol, continuing nebulizers and weaning O2.       Problem List:  Problem List as of 3/3/2022 Date Reviewed: 11/17/2021          Codes Class Noted - Resolved    Acute respiratory failure with hypoxia [facility]) ICD-10-CM: J96.01  ICD-9-CM: 518.81  3/2/2022 - Present        CHF (congestive heart failure) (HCC) ICD-10-CM: I50.9  ICD-9-CM: 428.0  11/15/2021 - Present        PNA (pneumonia) ICD-10-CM: J18.9  ICD-9-CM: 486  11/15/2021 - Present              Medications reviewed  Current Facility-Administered Medications   Medication Dose Route Frequency    albuterol-ipratropium (DUO-NEB) 2.5 MG-0.5 MG/3 ML  3 mL Nebulization Q6HWA RT    nitroglycerin (NITROSTAT) tablet 0.4 mg  0.4 mg SubLINGual Q5MIN PRN    lisinopriL (PRINIVIL, ZESTRIL) tablet 5 mg  5 mg Oral DAILY    carvediloL (COREG) tablet 3.125 mg  3.125 mg Oral BID WITH MEALS    ondansetron (ZOFRAN) injection 4 mg  4 mg IntraVENous Q6H PRN    acetaminophen (TYLENOL) tablet 650 mg  650 mg Oral Q6H PRN    furosemide (LASIX) injection 40 mg  40 mg IntraVENous BID    nitroglycerin (NITROBID) 2 % ointment 1 Inch  1 Inch Topical Q6H PRN    docusate sodium (COLACE) capsule 100 mg  100 mg Oral PRN    guaiFENesin ER (MUCINEX) tablet 600 mg  600 mg Oral Q12H    azithromycin (ZITHROMAX) 500 mg in 0.9% sodium chloride 250 mL (VIAL-MATE)  500 mg IntraVENous Q24H    nicotine (NICODERM CQ) 21 mg/24 hr patch 1 Patch  1 Patch TransDERmal DAILY PRN    aspirin delayed-release tablet 81 mg  81 mg Oral DAILY    albuterol (PROVENTIL VENTOLIN) nebulizer solution 1.25 mg  1.25 mg Nebulization Q4H PRN    budesonide-formoteroL (SYMBICORT) 160-4.5 mcg/actuation HFA inhaler 2 Puff  2 Puff Inhalation BID    pantoprazole (PROTONIX) tablet 40 mg  40 mg Oral DAILY    traZODone (DESYREL) tablet 50 mg  50 mg Oral QHS PRN       Review of Systems:   A comprehensive review of systems was negative except for that written in the HPI. Objective:   Physical Exam:     Visit Vitals  BP (!) 145/71 (BP 1 Location: Left upper arm, BP Patient Position: At rest;Supine)   Pulse 72   Temp 98.7 °F (37.1 °C)   Resp 18   Ht 5' 11\" (1.803 m)   Wt 72.6 kg (160 lb)   SpO2 97%   BMI 22.32 kg/m²    O2 Flow Rate (L/min): 4 l/min O2 Device: Nasal cannula    Temp (24hrs), Av.2 °F (36.8 °C), Min:97.5 °F (36.4 °C), Max:98.7 °F (37.1 °C)    701 -  1900  In: -   Out: 450 [Urine:450]   1901 -  0700  In: 420 [P.O.:420]  Out: 3125 [Urine:3125]    General:  Alert, cooperative, no distress, appears stated age. Head:  Normocephalic, without obvious abnormality, atraumatic. Eyes:  Conjunctivae/corneas clear. EOMs intact. Throat: Moist oral mucosa   Neck: Supple, symmetrical, trachea midline, no adenopathy, no JVD.    Lungs:    Diminished diffusely, no wheeze, not labored, crackles at bases faint   Chest wall:  No tenderness or deformity. Heart:   Irregular, heart rate 91, S1, S2 normal, no murmur, click, rub or gallop. Abdomen:   Soft, non-tender. , not distended, Bowel sounds present,  No rebound or guarding. Extremities: Extremities normal, atraumatic, no cyanosis. 1-2+ edema, improving   Pulses: 2+ and symmetric all extremities. Skin: Warm,dry   Neurologic: CNII-XII intact. No motor or sensory deficits. Data Review:       Recent Days:  Recent Labs     03/03/22  0637 03/02/22  0804   WBC 7.4 14.6*   HGB 7.5* 7.8*   HCT 26.6* 28.3*    306     Recent Labs     03/03/22 0637 03/02/22  0804    140   K 3.5 3.8    109*   CO2 25 25   * 162*   BUN 20 14   CREA 1.03 0.95   CA 9.2 8.5   MG 2.0  --    ALB  --  3.5   TBILI  --  0.5   ALT  --  15   INR  --  1.1     No results for input(s): PH, PCO2, PO2, HCO3, FIO2 in the last 72 hours.     24 Hour Results:  Recent Results (from the past 24 hour(s))   MAGNESIUM    Collection Time: 03/03/22  6:37 AM   Result Value Ref Range    Magnesium 2.0 1.6 - 2.4 mg/dL   LIPID PANEL    Collection Time: 03/03/22  6:37 AM   Result Value Ref Range    LIPID PROFILE        Cholesterol, total 126 <200 mg/dL    Triglyceride 41 <150 mg/dL    HDL Cholesterol 66 mg/dL    LDL, calculated 51.8 0 - 100 mg/dL    VLDL, calculated 8.2 mg/dL    CHOL/HDL Ratio 1.9 0.0 - 5.0     METABOLIC PANEL, BASIC    Collection Time: 03/03/22  6:37 AM   Result Value Ref Range    Sodium 137 136 - 145 mmol/L    Potassium 3.5 3.5 - 5.1 mmol/L    Chloride 106 97 - 108 mmol/L    CO2 25 21 - 32 mmol/L    Anion gap 6 5 - 15 mmol/L    Glucose 165 (H) 65 - 100 mg/dL    BUN 20 6 - 20 mg/dL    Creatinine 1.03 0.70 - 1.30 mg/dL    BUN/Creatinine ratio 19 12 - 20      GFR est AA >60 >60 ml/min/1.73m2    GFR est non-AA >60 >60 ml/min/1.73m2    Calcium 9.2 8.5 - 10.1 mg/dL   CBC WITH AUTOMATED DIFF    Collection Time: 03/03/22  6:37 AM   Result Value Ref Range    WBC 7.4 4.1 - 11.1 K/uL    RBC 3.88 (L) 4.10 - 5.70 M/uL    HGB 7.5 (L) 12.1 - 17.0 g/dL    HCT 26.6 (L) 36.6 - 50.3 %    MCV 68.6 (L) 80.0 - 99.0 FL    MCH 19.3 (L) 26.0 - 34.0 PG    MCHC 28.2 (L) 30.0 - 36.5 g/dL    RDW 19.9 (H) 11.5 - 14.5 %    PLATELET 598 262 - 116 K/uL    MPV 10.7 8.9 - 12.9 FL    NRBC 0.0 0.0  WBC    ABSOLUTE NRBC 0.00 0.00 - 0.01 K/uL    NEUTROPHILS 89 (H) 32 - 75 %    LYMPHOCYTES 7 (L) 12 - 49 %    MONOCYTES 4 (L) 5 - 13 %    EOSINOPHILS 0 0 - 7 %    BASOPHILS 0 0 - 1 %    IMMATURE GRANULOCYTES 0 0 - 0.5 %    ABS. NEUTROPHILS 6.5 1.8 - 8.0 K/UL    ABS. LYMPHOCYTES 0.5 (L) 0.8 - 3.5 K/UL    ABS. MONOCYTES 0.3 0.0 - 1.0 K/UL    ABS. EOSINOPHILS 0.0 0.0 - 0.4 K/UL    ABS. BASOPHILS 0.0 0.0 - 0.1 K/UL    ABS. IMM. GRANS. 0.0 0.00 - 0.04 K/UL    DF AUTOMATED     TROPONIN-HIGH SENSITIVITY    Collection Time: 03/03/22 10:05 AM   Result Value Ref Range    Troponin-High Sensitivity 24 0 - 76 ng/L       chest X-ray    Assessment/     Patient Active Problem List   Diagnosis Code    CHF (congestive heart failure) ([facility]) I50.9    PNA (pneumonia) J18.9    Acute respiratory failure with hypoxia ([facility]) J96.01           81year-old gentleman, known history of diastolic CHF/chronic, persistent atrial fibrillation, CAD describing ACS approximately 4 months ago treated at [facility], still smoking 1 pack of cigarettes daily, down from 2 packs to 3 months ago. Presents with acute onset of shortness of breath this morning, flash pulmonary edema more likely, pneumonia is a possibility, atypical, Covid is negative and he has been vaccinated and boosted. Initial EMS SPO2 reportedly in the 50s, improved to 100% on a nonrebreather mask where he remains currently appearing nonlabored in the emergency room. Diuresis initiated in the ED, does feel some improvement.   Admitting secondary to:  -Acute hypoxic respiratory failure, currently on 4 L, respiratory status is improved per patient.  -Acute/chronic diastolic CHF + history of at least moderate pulmonary hypertension, component right heart failure, diuresing well, respirations improved, edema is improving. Cardiology is following  -Probable COPD, does not appear to be in exacerbation, pulmonology appreciate  -cad, negative troponin  -Hypertension is fairly controlled  -Covid not detected, patient is up-to-date with his Covid vaccinations.  -Pro-Ivan less than 0.5, leukocytosis questionably reactive, feeling at this is less likely bacterial pneumonia.  -Chronic tobacco abuse, currently he says less than 1 pack a day, previously up to 4 months ago was smoking 2 packs of cigarettes daily.         Plan:        -Continue diuresis, Lasix IV 40 mg twice daily, KVNG's and daily weights  -Continue coreg, lisinopril, prn ntp 1\"  -Continue duo nebs scheduled, albuterol prn, mucolytic's, vibratory therapy, resume symbicort. Solu-Medrol was stopped  -Wean O2 to maintain sats above 90%  -Oral intake/fluid moderation is encouraged at bedside  -Tobacco cessation is strongly encouraged, NicoDerm is offered though he declines.     VTEP: Lovenox  Full CODE STATUS  Disposition: Patient resides at [facility]. Will get PT/OT recommending HHC/PT/OT. anticipating a return to [facility] discussed with: Patient/Family, Nurse,  and Consultant . Total time spent with patient: 30 minutes. This dictation was done by dragon, computer voice recognition software. Often unanticipated grammatical, syntax, phones and other interpretive errors are inadvertently transcribed. Please excuse errors that have escaped final proofreading.     Clayton Sevilla MD

## 2022-03-03 NOTE — PROGRESS NOTES
Problem: Falls - Risk of  Goal: *Absence of Falls  Description: Document Ashwin Gamma Fall Risk and appropriate interventions in the flowsheet.   Outcome: Progressing Towards Goal  Note: Fall Risk Interventions:  Mobility Interventions: Bed/chair exit alarm,Patient to call before getting OOB         Medication Interventions: Bed/chair exit alarm,Patient to call before getting OOB    Elimination Interventions: Bed/chair exit alarm,Call light in reach    History of Falls Interventions: Bed/chair exit alarm,Door open when patient unattended         Problem: Patient Education: Go to Patient Education Activity  Goal: Patient/Family Education  Outcome: Progressing Towards Goal     Problem: Patient Education: Go to Patient Education Activity  Goal: Patient/Family Education  Outcome: Progressing Towards Goal     Problem: Patient Education: Go to Patient Education Activity  Goal: Patient/Family Education  Outcome: Progressing Towards Goal

## 2022-03-03 NOTE — PROGRESS NOTES
OCCUPATIONAL THERAPY EVALUATION  Patient: Maddison Coker (63 y.o. male)  Date: 3/3/2022  Primary Diagnosis: Acute respiratory failure with hypoxia ([facility] [address] [J96.01]        Precautions: fall risk       ASSESSMENT  Pt is a 82 y/o M with PMH of HTN, GERD, alochol abuse, paroxysmal atrial fibrillation,h/o falls and CAD s/p PCI presenting to [facility] with c/o SOB (per note, when ED was called O2 was in 46s on RA) and some back pain, admitted 3/2/2022 and being treated for acute respiatory failure, acute/chronic CHF. HTN, possible COPD exacerbation. Pt received semi-supine in bed upon arrival, AXO x4, and agreeable to OT evaluations at this time. Per pt report, pt lives in senior living with caregiver support for IADLs at baseline and IND w/ ADLs and utilizes [facility] and R shoe lift for ambulation at [facility]. Based on current observations, pt presents with deficits in generalized strength/AROM, static/dynamic sitting balance, static/dynamic standing balance, and functional activity tolerance impacting overall performance of ADLs and functional transfers/mobility. Pt currently IND with bed mobility. Pt simulated donning/socks and donning/doffing clean gown sitting at EOB w/ SBA. Pt declined using AD during assessment, however, agreeable to having gait belt donned during bathroom mobility. Pt req'd CGA for all functional mobility for safety (pt reports typically wearing lift shoe during ambulation, however, did not have it with him). Pt completed g/h tasks standing at sinktop w/ stand-by assist. Tolerated 2-3 minutes before completing tasks in seating d/t decreased functional endurance (HR noted to be 100-150 during activity; O2 94% on RA). Pt returned to supine w/ all needs/call bell in reach. Overall, pt tolerates session fair with no c/o pain. Pt would benefit from continued skilled OT services to address current impairments and improve IND and safety with self cares and functional transfers/mobility.  Current OT d/c recommendation return to senior living w/ HHOT once medically appropriate. Other factors to consider for discharge: time of onset, support system, DME     Patient will benefit from skilled therapy intervention to address the above noted impairments. PLAN :  Recommendations and Planned Interventions: self care training, functional mobility training, therapeutic exercise, balance training, therapeutic activities, endurance activities and patient education    Frequency/Duration: Patient will be followed by occupational therapy:  2-3x/week to address goals. Recommendation for discharge: (in order for the patient to meet his/her long term goals)  CHRISTOPHER w/ HHOT    This discharge recommendation:  Has been made in collaboration with the attending provider and/or case management    IF patient discharges home will need the following DME: patient owns DME required for discharge       SUBJECTIVE:   Patient stated I feel a little short of breath.     OBJECTIVE DATA SUMMARY:   HISTORY:   Past Medical History:   Diagnosis Date    GERD (gastroesophageal reflux disease)     Hypertension    No past surgical history on file. Expanded or extensive additional review of patient history:     Home Situation  Home Environment: Assisted living  One/Two Story Residence: One story  Living Alone: No  Support Systems: Caregiver/Home Care Staff,Assisted Living  Patient Expects to be Discharged to[de-identified] Assisted Living  Current DME Used/Available at Home: Cane, straight      EXAMINATION OF PERFORMANCE DEFICITS:  Cognitive/Behavioral Status:  Neurologic State: Alert  Orientation Level: Oriented X4                  Hearing: Auditory  Auditory Impairment: None        Range of Motion:  AROM: Generally decreased, functional (Limited ROM in L SH (baseline))                         Strength:  Strength: Generally decreased, functional                Coordination:     Fine Motor Skills-Upper: Left Intact; Right Intact    Gross Motor Skills-Upper: Left Intact; Right Intact      Balance:  Sitting: Intact; Without support  Standing: Impaired; With support  Standing - Static: Good  Standing - Dynamic : Fair;Constant support    Functional Mobility and Transfers for ADLs:  Bed Mobility:  Rolling: Independent  Supine to Sit: Independent  Sit to Supine: Independent    Transfers:  Sit to Stand: Contact guard assistance  Stand to Sit: Contact guard assistance  Bathroom Mobility: Contact guard assistance    ADL Intervention and task modifications:       Grooming  Position Performed: Standing;Seated in chair  Washing Face: Stand-by assistance  Washing Hands: Stand-by assistance  Brushing Teeth: Stand-by assistance              Upper Body Dressing Assistance  Shirt simulation with hospital gown: Set-up; Stand-by assistance    Lower Body Dressing Assistance  Socks: Stand-by assistance (simulated )  Position Performed: Seated edge of bed    Toileting  Bladder Hygiene: Contact guard assistance (standing at toilet)  Clothing Management: Contact guard assistance  Cues:  (standing at toilet)         Therapeutic Exercise:  Pt would benefit from UE HEP to improve overall UE AROM/strength and can be further educated in next treatment session. Functional Measure:    [address] [place] [id number] AM-PAC \"6 Clicks\"                                                       Daily Activity Inpatient Short Form  How much help from another person does the patient currently need. .. Total; A Lot A Little None   1. Putting on and taking off regular lower body clothing? []  1 []  2 [x]  3 []  4   2. Bathing (including washing, rinsing, drying)? []  1 []  2 [x]  3 []  4   3. Toileting, which includes using toilet, bedpan or urinal? [] 1 []  2 [x]  3 []  4   4. Putting on and taking off regular upper body clothing? []  1 []  2 []  3 [x]  4   5. Taking care of personal grooming such as brushing teeth? []  1 []  2 [x]  3 []  4   6. Eating meals?  []  1 []  2 []  3 [x]  4   © 2007, Trustees of [address] [place] [address] [address] [address], under license to [employer]. All rights reserved     Score:      Interpretation of Tool:  Represents clinically-significant functional categories (i.e. Activities of daily living). Percentage of Impairment CH    0%   CI    1-19% CJ    20-39% CK    40-59% CL    60-79% CM    80-99% CN     100%   [facility]  Score 6-24 24 23 20-22 15-19 10-14 7-9 6         Occupational Therapy Evaluation Charge Determination   History Examination Decision-Making   LOW Complexity : Brief history review  LOW Complexity : 1-3 performance deficits relating to physical, cognitive , or psychosocial skils that result in activity limitations and / or participation restrictions  MEDIUM Complexity : Patient may present with comorbidities that affect occupational performnce. Miniml to moderate modification of tasks or assistance (eg, physical or verbal ) with assesment(s) is necessary to enable patient to complete evaluation       Based on the above components, the patient evaluation is determined to be of the following complexity level: LOW   Pain Ratin/10    Activity Tolerance:   Fair and requires rest breaks    After treatment patient left in no apparent distress:    Supine in bed, Call bell within reach and Side rails x 3    COMMUNICATION/EDUCATION:   The patients plan of care was discussed with: Physical therapist and Registered nurse. Patient/family have participated as able in goal setting and plan of care. and Patient/family agree to work toward stated goals and plan of care. This patients plan of care is appropriate for delegation to [facility].       Thank you for this referral.  Shaista Ariza  Time Calculation: 25 mins   Problem: Self Care Deficits Care Plan (Adult)  Goal: *Acute Goals and Plan of Care (Insert Text)  Description: Pt will be MOD I grooming standing sinkside LRAD  Pt will be IND LE dressing sitting EOB/long sit  Pt will be MOD I sit <>  prep for toileting LRAD  Pt will be MOD I toileting/toilet transfer/cloth mgmt LRAD  Pt will be IND following UE HEP in prep for self care tasks   Outcome: Not Met

## 2022-03-03 NOTE — PROGRESS NOTES
PHYSICAL THERAPY EVALUATION  Patient: Bret Rodriguez (68 y.o. male)  Date: 3/3/2022  Primary Diagnosis: Acute respiratory failure with hypoxia ([facility] [address] [J96.01]        Precautions: fall  ASSESSMENT  Pt is a 82 y/o M with PMH of HTN, GERD, alochol abuse, paroxysmal atrial fibrillation,h/o falls and CAD s/p PCI presenting to [facility] with c/o SOB (per note, when ED was called O2 was in 46s on RA) and some back pain, admitted 3/2/2022 and being treated for acute respiatory failure, acute/chronic CHF. HTN, possible COPD exacerbation. Pt received semi-supine in bed upon arrival, AXO x4, and agreeable to OT evaluations at this time. Per pt report, pt lives in senior living with caregiver support for IADLs at baseline and IND w/ ADLs and utilizes [facility] and R shoe lift for ambulation at [facility]. Based on current observations, pt presents with deficits in generalized strength/AROM, static/dynamic sitting balance, static/dynamic standing balance, and functional activity tolerance impacting overall performance of ADLs and functional transfers/mobility. Pt currently IND with bed mobility. Pt declined using AD during assessment, however, agreeable to having gait belt donned during bathroom mobility. Pt req'd CGA for all functional mobility for safety (pt reports typically wearing lift shoe during ambulation, however, did not have it with him). . Able to amb to bathroom and back with sba , decreased functional endurance (HR noted to be 100-150 during activity; O2 94% on RA). Pt returned to supine w/ all needs/call bell in reach. OT came to work with patient. Overall, pt tolerates session fair with no c/o pain. Pt would benefit from continued skilled PT services to address current impairments and improve IND and safety with self cares and functional transfers/mobility. Current PT d/c recommendation return to senior living w/ HHPT once medically appropriate.      Other factors to consider for discharge: time of onset, support system, DME     Patient will benefit from skilled therapy intervention to address the above noted impairments. PLAN :  Recommendations and Planned Interventions: bed mobility training, transfer training, gait training, therapeutic exercises, patient and family training/education, and therapeutic activities      Frequency/Duration: Patient will be followed by physical therapy:  2-3x/week to address goals. Recommendation for discharge: (in order for the patient to meet his/her long term goals)  Home with [address] [place] [address]    This discharge recommendation:  Has been made in collaboration with the attending provider and/or case management    IF patient discharges home will need the following DME: rolling walker         SUBJECTIVE:   Patient stated I am better.     OBJECTIVE DATA SUMMARY:   HISTORY:    Past Medical History:   Diagnosis Date    GERD (gastroesophageal reflux disease)     Hypertension    No past surgical history on file. Personal factors and/or comorbidities impacting plan of care:   Home Situation  Home Environment: Assisted living  One/Two Story Residence: One story  Living Alone: No  Support Systems: Caregiver/Home Care Staff,Assisted Living  Patient Expects to be Discharged to[de-identified] Assisted Living  Current DME Used/Available at Home: Cane, straight    EXAMINATION/PRESENTATION/DECISION MAKING:   Critical Behavior:  Neurologic State: Alert  Orientation Level: Oriented X4        Hearing: Auditory  Auditory Impairment: None  Range Of Motion:  AROM: Generally decreased, functional                       Strength:    Strength: Generally decreased, functional                    Functional Mobility:  Bed Mobility:  Rolling: Independent  Supine to Sit: Independent  Sit to Supine: Independent     Transfers:  Sit to Stand: Contact guard assistance  Stand to Sit: Contact guard assistance                       Balance:   Sitting: Intact; Without support  Standing: Intact; With support  Standing - Static: Good  Standing - Dynamic : Fair;Constant support  Ambulation/Gait Training:  Distance (ft): 25 Feet (ft)  Assistive Device: Gait belt  Ambulation - Level of Assistance: Supervision     Gait Description (WDL): Exceptions to WDL                                     Functional Measure:  [facility] AM-PAC 6 Clicks         Basic Mobility Inpatient Short Form  How much difficulty does the patient currently have. .. Unable A Lot A Little None   1. Turning over in bed (including adjusting bedclothes, sheets and blankets)? [] 1   [] 2   [x] 3   [] 4   2. Sitting down on and standing up from a chair with arms ( e.g., wheelchair, bedside commode, etc.)   [] 1   [] 2   [x] 3   [] 4   3. Moving from lying on back to sitting on the side of the bed? [] 1   [] 2   [x] 3   [] 4          How much help from another person does the patient currently need. .. Total A Lot A Little None   4. Moving to and from a bed to a chair (including a wheelchair)? [] 1   [] 2   [x] 3   [] 4   5. Need to walk in hospital room? [] 1   [] 2   [x] 3   [] 4   6. Climbing 3-5 steps with a railing? [] 1   [] 2   [x] 3   [] 4   © 2007, Trustees of [facility], under license to [employer]. All rights reserved     Score:  Initial: 18/24 Most Recent: X (Date:03/03/2022 )   Interpretation of Tool:  Represents activities that are increasingly more difficult (i.e. Bed mobility, Transfers, Gait).   Score 24 23 22-20 19-15 14-10 9-7 6   Modifier CH CI CJ CK CL CM CN           Physical Therapy Evaluation Charge Determination   History Examination Presentation Decision-Making   LOW Complexity : Zero comorbidities / personal factors that will impact the outcome / POC LOW Complexity : 1-2 Standardized tests and measures addressing body structure, function, activity limitation and / or participation in recreation  LOW Complexity : Stable, uncomplicated  LOW Complexity : FOTO score of       Based on the above components, the patient evaluation is determined to be of the following complexity level: LOW     Pain Ratin/10    Activity Tolerance:   Good  Please refer to the flowsheet for vital signs taken during this treatment. After treatment patient left in no apparent distress:   Supine in bed and Call bell within reach    Problem: Mobility Impaired (Adult and Pediatric)  Goal: *Acute Goals and Plan of Care (Insert Text)  Description: Patient will move from supine to sit and sit to supine , scoot up and down, and roll side to side in bed with supervision/set-up within 7 day(s). Patient will transfer from bed to chair and chair to bed with independence using the least restrictive device within 7 day(s). Patient will improve static standing balance to independence within 1 week(s). Patient will ambulate 50 feet with independence with least restrictive device within 1 weeks. Outcome: Progressing Towards Goal     COMMUNICATION/EDUCATION:   The patients plan of care was discussed with: Occupational therapist, Registered nurse, and Case management. Fall prevention education was provided and the patient/caregiver indicated understanding., Patient/family have participated as able in goal setting and plan of care. , and Patient/family agree to work toward stated goals and plan of care.     Thank you for this referral.  Loc Kincaid PT

## 2022-03-04 LAB
ALBUMIN SERPL-MCNC: 3.4 G/DL (ref 3.5–5)
ANION GAP SERPL CALC-SCNC: 5 MMOL/L (ref 5–15)
ANION GAP SERPL CALC-SCNC: 6 MMOL/L (ref 5–15)
BASOPHILS # BLD: 0 K/UL (ref 0–0.1)
BASOPHILS NFR BLD: 0 % (ref 0–1)
BNP SERPL-MCNC: 5658 PG/ML
BUN SERPL-MCNC: 29 MG/DL (ref 6–20)
BUN SERPL-MCNC: 29 MG/DL (ref 6–20)
BUN/CREAT SERPL: 26 (ref 12–20)
BUN/CREAT SERPL: 26 (ref 12–20)
CA-I BLD-MCNC: 8.9 MG/DL (ref 8.5–10.1)
CA-I BLD-MCNC: 9 MG/DL (ref 8.5–10.1)
CHLORIDE SERPL-SCNC: 104 MMOL/L (ref 97–108)
CHLORIDE SERPL-SCNC: 105 MMOL/L (ref 97–108)
CO2 SERPL-SCNC: 28 MMOL/L (ref 21–32)
CO2 SERPL-SCNC: 28 MMOL/L (ref 21–32)
CREAT SERPL-MCNC: 1.11 MG/DL (ref 0.7–1.3)
CREAT SERPL-MCNC: 1.13 MG/DL (ref 0.7–1.3)
DIFFERENTIAL METHOD BLD: ABNORMAL
EOSINOPHIL # BLD: 0 K/UL (ref 0–0.4)
EOSINOPHIL NFR BLD: 0 % (ref 0–7)
ERYTHROCYTE [DISTWIDTH] IN BLOOD BY AUTOMATED COUNT: 19.9 % (ref 11.5–14.5)
GLUCOSE SERPL-MCNC: 95 MG/DL (ref 65–100)
GLUCOSE SERPL-MCNC: 98 MG/DL (ref 65–100)
HCT VFR BLD AUTO: 27 % (ref 36.6–50.3)
HGB BLD-MCNC: 7.7 G/DL (ref 12.1–17)
IMM GRANULOCYTES # BLD AUTO: 0.1 K/UL (ref 0–0.04)
IMM GRANULOCYTES NFR BLD AUTO: 0 % (ref 0–0.5)
LYMPHOCYTES # BLD: 1.7 K/UL (ref 0.8–3.5)
LYMPHOCYTES NFR BLD: 11 % (ref 12–49)
MAGNESIUM SERPL-MCNC: 2.2 MG/DL (ref 1.6–2.4)
MCH RBC QN AUTO: 19.4 PG (ref 26–34)
MCHC RBC AUTO-ENTMCNC: 28.5 G/DL (ref 30–36.5)
MCV RBC AUTO: 68.2 FL (ref 80–99)
MONOCYTES # BLD: 1.6 K/UL (ref 0–1)
MONOCYTES NFR BLD: 10 % (ref 5–13)
NEUTS SEG # BLD: 12.4 K/UL (ref 1.8–8)
NEUTS SEG NFR BLD: 79 % (ref 32–75)
NRBC # BLD: 0 K/UL (ref 0–0.01)
NRBC BLD-RTO: 0 PER 100 WBC
PHOSPHATE SERPL-MCNC: 4.1 MG/DL (ref 2.6–4.7)
PLATELET # BLD AUTO: 317 K/UL (ref 150–400)
PMV BLD AUTO: 10 FL (ref 8.9–12.9)
POTASSIUM SERPL-SCNC: 3.9 MMOL/L (ref 3.5–5.1)
POTASSIUM SERPL-SCNC: 4.1 MMOL/L (ref 3.5–5.1)
RBC # BLD AUTO: 3.96 M/UL (ref 4.1–5.7)
SODIUM SERPL-SCNC: 138 MMOL/L (ref 136–145)
SODIUM SERPL-SCNC: 138 MMOL/L (ref 136–145)
WBC # BLD AUTO: 15.8 K/UL (ref 4.1–11.1)

## 2022-03-04 PROCEDURE — 74011250637 HC RX REV CODE- 250/637: Performed by: INTERNAL MEDICINE

## 2022-03-04 PROCEDURE — 74011250636 HC RX REV CODE- 250/636: Performed by: INTERNAL MEDICINE

## 2022-03-04 PROCEDURE — 85025 COMPLETE CBC W/AUTO DIFF WBC: CPT

## 2022-03-04 PROCEDURE — 83880 ASSAY OF NATRIURETIC PEPTIDE: CPT

## 2022-03-04 PROCEDURE — 97530 THERAPEUTIC ACTIVITIES: CPT

## 2022-03-04 PROCEDURE — 74011000250 HC RX REV CODE- 250: Performed by: INTERNAL MEDICINE

## 2022-03-04 PROCEDURE — 83735 ASSAY OF MAGNESIUM: CPT

## 2022-03-04 PROCEDURE — 80069 RENAL FUNCTION PANEL: CPT

## 2022-03-04 PROCEDURE — 36415 COLL VENOUS BLD VENIPUNCTURE: CPT

## 2022-03-04 PROCEDURE — 94640 AIRWAY INHALATION TREATMENT: CPT

## 2022-03-04 PROCEDURE — 80048 BASIC METABOLIC PNL TOTAL CA: CPT

## 2022-03-04 PROCEDURE — 65270000029 HC RM PRIVATE

## 2022-03-04 RX ORDER — FUROSEMIDE 40 MG/1
40 TABLET ORAL 2 TIMES DAILY
Status: DISCONTINUED | OUTPATIENT
Start: 2022-03-04 | End: 2022-03-08 | Stop reason: HOSPADM

## 2022-03-04 RX ORDER — IPRATROPIUM BROMIDE AND ALBUTEROL SULFATE 2.5; .5 MG/3ML; MG/3ML
3 SOLUTION RESPIRATORY (INHALATION)
Status: DISCONTINUED | OUTPATIENT
Start: 2022-03-04 | End: 2022-03-08 | Stop reason: HOSPADM

## 2022-03-04 RX ORDER — POTASSIUM CHLORIDE 1.5 G/1.77G
20 POWDER, FOR SOLUTION ORAL 2 TIMES DAILY WITH MEALS
Status: DISPENSED | OUTPATIENT
Start: 2022-03-04 | End: 2022-03-06

## 2022-03-04 RX ORDER — FUROSEMIDE 10 MG/ML
40 INJECTION INTRAMUSCULAR; INTRAVENOUS 2 TIMES DAILY
Status: DISCONTINUED | OUTPATIENT
Start: 2022-03-04 | End: 2022-03-04

## 2022-03-04 RX ADMIN — PANTOPRAZOLE SODIUM 40 MG: 40 TABLET, DELAYED RELEASE ORAL at 09:06

## 2022-03-04 RX ADMIN — ACETAMINOPHEN 650 MG: 325 TABLET ORAL at 22:43

## 2022-03-04 RX ADMIN — POTASSIUM CHLORIDE 20 MEQ: 1.5 POWDER, FOR SOLUTION ORAL at 17:01

## 2022-03-04 RX ADMIN — FUROSEMIDE 40 MG: 10 INJECTION, SOLUTION INTRAMUSCULAR; INTRAVENOUS at 09:05

## 2022-03-04 RX ADMIN — TRAZODONE HYDROCHLORIDE 50 MG: 50 TABLET ORAL at 22:43

## 2022-03-04 RX ADMIN — IPRATROPIUM BROMIDE AND ALBUTEROL SULFATE 3 ML: .5; 3 SOLUTION RESPIRATORY (INHALATION) at 08:16

## 2022-03-04 RX ADMIN — GUAIFENESIN 600 MG: 600 TABLET, EXTENDED RELEASE ORAL at 22:43

## 2022-03-04 RX ADMIN — CARVEDILOL 3.12 MG: 3.12 TABLET, FILM COATED ORAL at 17:00

## 2022-03-04 RX ADMIN — FUROSEMIDE 40 MG: 40 TABLET ORAL at 17:00

## 2022-03-04 RX ADMIN — ASPIRIN 81 MG: 81 TABLET, COATED ORAL at 09:05

## 2022-03-04 RX ADMIN — GUAIFENESIN 600 MG: 600 TABLET, EXTENDED RELEASE ORAL at 09:06

## 2022-03-04 RX ADMIN — BUDESONIDE AND FORMOTEROL FUMARATE DIHYDRATE 2 PUFF: 160; 4.5 AEROSOL RESPIRATORY (INHALATION) at 08:16

## 2022-03-04 RX ADMIN — CARVEDILOL 3.12 MG: 3.12 TABLET, FILM COATED ORAL at 09:06

## 2022-03-04 RX ADMIN — LISINOPRIL 5 MG: 5 TABLET ORAL at 09:05

## 2022-03-04 NOTE — PROGRESS NOTES
Progress Note    Patient: Derril Peabody MRN: [id number]  SSN: [id number]    YOB: 1940  Age: 81 y.o. Sex: male      Admit Date: 3/2/2022    LOS: 2 days     Subjective:     No acute events overnight    Objective:     Vitals:    03/04/22 0514 03/04/22 0817 03/04/22 0828 03/04/22 0900   BP:    (!) 174/82   Pulse:    82   Resp:    20   Temp:    98.2 °F (36.8 °C)   SpO2:  98% 98% 96%   Weight: 73.8 kg (162 lb 11.2 oz)      Height:            Intake and Output:  Current Shift: No intake/output data recorded. Last three shifts: 03/02 1901 - 03/04 0700  In: -   Out: 4600 [Urine:4600]    Physical Exam:   General:  Alert, cooperative, no distress, appears stated age. Eyes:  Conjunctivae/corneas clear. PERRL, EOMs intact. Fundi benign   Ears:  Normal TMs and external ear canals both ears. Nose: Nares normal. Septum midline. Mucosa normal. No drainage or sinus tenderness. Mouth/Throat: Lips, mucosa, and tongue normal. Teeth and gums normal.   Neck: Supple, symmetrical, trachea midline, no adenopathy, thyroid: no enlargment/tenderness/nodules, no carotid bruit and no JVD. Back:   Symmetric, no curvature. ROM normal. No CVA tenderness. Lungs:   Clear to auscultation bilaterally. Heart:  Regular rate and rhythm, S1, S2 normal, no murmur, click, rub or gallop. Abdomen:   Soft, non-tender. Bowel sounds normal. No masses,  No organomegaly. Extremities: Extremities normal, atraumatic, no cyanosis or edema. Pulses: 2+ and symmetric all extremities. Skin: Skin color, texture, turgor normal. No rashes or lesions   Lymph nodes: Cervical, supraclavicular, and axillary nodes normal.   Neurologic: CNII-XII intact. Normal strength, sensation and reflexes throughout. Lab/Data Review: All lab results for the last 24 hours reviewed.          Assessment:     Active Problems:    Acute respiratory failure with hypoxia ([address] (3/2/2022)    Patient is an 73-year-old white male with:  1. Karle Masker fibrillation with rapid ventricular rate  2.  Alcohol abuse  3.  Nicotine dependence  4.  Coronary artery status post PCI  5.  Hyperlipidemia  6.  Frequent falls  7.  Heart failure with preserved ejection fraction  8.  Anemia       Plan:     Urine output 4.6 L. White count is worse.  15.4. Hemoglobin 7.7, platelet 486. Is BUN increased to 29, creatinine 1.1. Troponin was negative. BNP has increased. Continue IV diuresis. Currently on aspirin, carvedilol. Reduce the Lasix to 40 mg twice a day.   Signed By: Ermias Plata MD     March 4, 2022

## 2022-03-04 NOTE — PROGRESS NOTES
PHYSICAL THERAPY TREATMENT  Patient: Maddison Coker (65 y.o. male)  Date: 3/4/2022  Diagnosis: Acute respiratory failure with hypoxia ([facility] [address] [J96.01] <principal problem not specified>       Precautions:    Chart, physical therapy assessment, plan of care and goals were reviewed. ASSESSMENT  Patient received semi-supine in bed , agreeable for PT treatment . Pt is  IND for rolling to L side , mod I for supine<>sit transfers , demonstrates intact static/dynamic sitting balance with support . Completed there ex's in unsupported sitting position for b/l LE strengthening . Pt performed STS x 3 attempts with SBA, SBA for SPT needs cues for hand placement . Pt ambulated - [de-identified]'  with Rw, CGA . demonstrates  slow lina with cues to stay close to the walker . SPO2 post ambulation - 94%. Overall, pt tolerates session fair today demonstrating improved ability to perform transfers  with decreased level of assist of SBA . Increased ambulatory distance of [de-identified]' with RW, SBA/CGA . Pt is progressing towards the goals, continues with skilled PT services to address deficits with static/dynamic standing balance , activity tolerance and strength b/l Le impacting overall performance of  transfers/mobility  . Current Level of Function Impacting Discharge (mobility/balance): Pt requires SBA for transfers and SBA/CGA for ambulation    Other factors to consider for discharge: Pt is close to his functional baseline         PLAN :  Patient continues to benefit from skilled intervention to address the above impairments. Continue treatment per established plan of care. to address goals.     Recommendation for discharge: (in order for the patient to meet his/her long term goals)  Home with [address] [place] [address]    This discharge recommendation:  Has been made in collaboration with the attending provider and/or case management    IF patient discharges home will need the following DME: rolling walker       SUBJECTIVE:   Patient stated  I am feeling better     OBJECTIVE DATA SUMMARY:   Critical Behavior:  Neurologic State: Alert  Orientation Level: Oriented X4  Cognition: Follows commands     Functional Mobility Training:  Bed Mobility:  Rolling: Independent  Supine to Sit: Modified independent  Sit to Supine: Modified independent    Transfers:  Sit to Stand: Stand-by assistance  Stand to Sit: Stand-by assistance    Balance:  Sitting: Intact; Without support  Standing: Intact; With support  Standing - Static: Good  Standing - Dynamic : Fair;Constant support  Ambulation/Gait Training:  Distance (ft): 80 Feet (ft)  Assistive Device: Gait belt;Walker, rolling  Ambulation - Level of Assistance: Contact guard assistance/SBA      Therapeutic Exercises:   AP, seated marches, LAQ - 15 reps  Pain Ratin/10    Activity Tolerance:   Fair and requires rest breaks  Please refer to the flowsheet for vital signs taken during this treatment. After treatment patient left in no apparent distress:   Sitting in chair and Call bell within reach    COMMUNICATION/COLLABORATION:   The patients plan of care was discussed with: Registered nurse. Problem: Mobility Impaired (Adult and Pediatric)  Goal: *Acute Goals and Plan of Care (Insert Text)  Description: Patient will move from supine to sit and sit to supine , scoot up and down, and roll side to side in bed with supervision/set-up within 7 day(s). Patient will transfer from bed to chair and chair to bed with independence using the least restrictive device within 7 day(s). Patient will improve static standing balance to independence within 1 week(s). Patient will ambulate 50 feet with independence with least restrictive device within 1 weeks.        Outcome: Progressing Towards Goal       Jenny Parson   Time Calculation: 38 mins

## 2022-03-04 NOTE — PROGRESS NOTES
Patient recc'd for [facility] by PT/OT, CM met with patient to discuss, patient agreeable, stated he lives at [facility], referral sent via mykel, awaiting acceptance. CM continues to follow.

## 2022-03-04 NOTE — PROGRESS NOTES
Hospitalist Progress Note               Daily Progress Note: 14/2022  4year-old gentleman with a history of CAD, hypertension, GERD, persistent atrial fibrillation, COPD presented to the emergency department secondary to shortness of breath. Shortness of breath woke him up on the date of presentation. Hypoxic when EMS arrived in the 50s, responded well to nonrebreather mask and brought in for evaluation. Diuresis initiated in the ED, feeling some improvement, admitted secondary to acute hypoxic respiratory failure, acute on chronic diastolic CHF with a history of at least moderate pulmonary hypertension, component of right heart failure and possible COPD exacerbation. Subjective: The patient is seen for follow  up.    No cp, sob better  spo2 97%, o2 decreased to 2L    Problem List:  Problem List as of 3/4/2022 Date Reviewed: 11/17/2021          Codes Class Noted - Resolved    Acute respiratory failure with hypoxia [facility]) ICD-10-CM: J96.01  ICD-9-CM: 518.81  3/2/2022 - Present        CHF (congestive heart failure) (HCC) ICD-10-CM: I50.9  ICD-9-CM: 428.0  11/15/2021 - Present        PNA (pneumonia) ICD-10-CM: J18.9  ICD-9-CM: 486  11/15/2021 - Present              Medications reviewed  Current Facility-Administered Medications   Medication Dose Route Frequency    potassium chloride (KLOR-CON) packet for solution 20 mEq  20 mEq Oral BID WITH MEALS    albuterol-ipratropium (DUO-NEB) 2.5 MG-0.5 MG/3 ML  3 mL Nebulization Q6H PRN    furosemide (LASIX) tablet 40 mg  40 mg Oral BID    nitroglycerin (NITROSTAT) tablet 0.4 mg  0.4 mg SubLINGual Q5MIN PRN    lisinopriL (PRINIVIL, ZESTRIL) tablet 5 mg  5 mg Oral DAILY    carvediloL (COREG) tablet 3.125 mg  3.125 mg Oral BID WITH MEALS    ondansetron (ZOFRAN) injection 4 mg  4 mg IntraVENous Q6H PRN    acetaminophen (TYLENOL) tablet 650 mg  650 mg Oral Q6H PRN    nitroglycerin (NITROBID) 2 % ointment 1 Inch  1 Inch Topical Q6H PRN    docusate sodium (COLACE) capsule 100 mg  100 mg Oral PRN    guaiFENesin ER (MUCINEX) tablet 600 mg  600 mg Oral Q12H    nicotine (NICODERM CQ) 21 mg/24 hr patch 1 Patch  1 Patch TransDERmal DAILY PRN    aspirin delayed-release tablet 81 mg  81 mg Oral DAILY    albuterol (PROVENTIL VENTOLIN) nebulizer solution 1.25 mg  1.25 mg Nebulization Q4H PRN    pantoprazole (PROTONIX) tablet 40 mg  40 mg Oral DAILY    traZODone (DESYREL) tablet 50 mg  50 mg Oral QHS PRN       Review of Systems:   A comprehensive review of systems was negative except for that written in the HPI. Objective:   Physical Exam:     Visit Vitals  /73 (BP 1 Location: Left upper arm, BP Patient Position: At rest;Supine)   Pulse 70   Temp 98.1 °F (36.7 °C)   Resp 20   Ht 5' 11\" (1.803 m)   Wt 73.8 kg (162 lb 11.2 oz)   SpO2 97%   BMI 22.69 kg/m²    O2 Flow Rate (L/min): 2 l/min O2 Device: Nasal cannula    Temp (24hrs), Av.4 °F (36.9 °C), Min:98.1 °F (36.7 °C), Max:98.7 °F (37.1 °C)    701 -  190  In: -   Out: 2843 [MQGFO:8433]   1901 -  0700  In: -   Out: 4600 [Urine:4600]    General:  Alert, cooperative, no distress, appears stated age. Head:  Normocephalic, without obvious abnormality, atraumatic. Eyes:  Conjunctivae/corneas clear. EOMs intact. Throat: Moist oral mucosa   Neck: Supple, symmetrical, trachea midline, no adenopathy, no JVD. Lungs:    Diminished diffusely, no wheeze, not labored, crackles at bases faint   Chest wall:  No tenderness or deformity. Heart:   Irregular, heart rate 91, S1, S2 normal, no murmur, click, rub or gallop. Abdomen:   Soft, non-tender. , not distended, Bowel sounds present,  No rebound or guarding. Extremities: Extremities normal, atraumatic, no cyanosis. 1-2+ edema, improving   Pulses: 2+ and symmetric all extremities. Skin: Warm,dry   Neurologic: CNII-XII intact. No motor or sensory deficits.         Data Review:       Recent Days:  Recent Labs     22  0844 22  7777 03/02/22  0804   WBC 15.8* 7.4 14.6*   HGB 7.7* 7.5* 7.8*   HCT 27.0* 26.6* 28.3*    284 306     Recent Labs     03/04/22  0844 03/03/22  0637 03/02/22  0804     138 137 140   K 4.1  3.9 3.5 3.8     105 106 109*   CO2 28  28 25 25   GLU 95  98 165* 162*   BUN 29*  29* 20 14   CREA 1.11  1.13 1.03 0.95   CA 8.9  9.0 9.2 8.5   MG 2.2 2.0  --    PHOS 4.1  --   --    ALB 3.4*  --  3.5   TBILI  --   --  0.5   ALT  --   --  15   INR  --   --  1.1     No results for input(s): PH, PCO2, PO2, HCO3, FIO2 in the last 72 hours. 24 Hour Results:  Recent Results (from the past 24 hour(s))   CBC WITH AUTOMATED DIFF    Collection Time: 03/04/22  8:44 AM   Result Value Ref Range    WBC 15.8 (H) 4.1 - 11.1 K/uL    RBC 3.96 (L) 4.10 - 5.70 M/uL    HGB 7.7 (L) 12.1 - 17.0 g/dL    HCT 27.0 (L) 36.6 - 50.3 %    MCV 68.2 (L) 80.0 - 99.0 FL    MCH 19.4 (L) 26.0 - 34.0 PG    MCHC 28.5 (L) 30.0 - 36.5 g/dL    RDW 19.9 (H) 11.5 - 14.5 %    PLATELET 064 178 - 867 K/uL    MPV 10.0 8.9 - 12.9 FL    NRBC 0.0 0.0  WBC    ABSOLUTE NRBC 0.00 0.00 - 0.01 K/uL    NEUTROPHILS 79 (H) 32 - 75 %    LYMPHOCYTES 11 (L) 12 - 49 %    MONOCYTES 10 5 - 13 %    EOSINOPHILS 0 0 - 7 %    BASOPHILS 0 0 - 1 %    IMMATURE GRANULOCYTES 0 0 - 0.5 %    ABS. NEUTROPHILS 12.4 (H) 1.8 - 8.0 K/UL    ABS. LYMPHOCYTES 1.7 0.8 - 3.5 K/UL    ABS. MONOCYTES 1.6 (H) 0.0 - 1.0 K/UL    ABS. EOSINOPHILS 0.0 0.0 - 0.4 K/UL    ABS. BASOPHILS 0.0 0.0 - 0.1 K/UL    ABS. IMM.  GRANS. 0.1 (H) 0.00 - 0.04 K/UL    DF AUTOMATED     RENAL FUNCTION PANEL    Collection Time: 03/04/22  8:44 AM   Result Value Ref Range    Sodium 138 136 - 145 mmol/L    Potassium 3.9 3.5 - 5.1 mmol/L    Chloride 105 97 - 108 mmol/L    CO2 28 21 - 32 mmol/L    Anion gap 5 5 - 15 mmol/L    Glucose 98 65 - 100 mg/dL    BUN 29 (H) 6 - 20 mg/dL    Creatinine 1.13 0.70 - 1.30 mg/dL    BUN/Creatinine ratio 26 (H) 12 - 20      GFR est AA >60 >60 ml/min/1.73m2    GFR est non-AA >60 >60 ml/min/1.73m2    Calcium 9.0 8.5 - 10.1 mg/dL    Phosphorus 4.1 2.6 - 4.7 mg/dL    Albumin 3.4 (L) 3.5 - 5.0 g/dL   NT-PRO BNP    Collection Time: 03/04/22  8:44 AM   Result Value Ref Range    NT pro-BNP 5,658 (H) <450 pg/mL   MAGNESIUM    Collection Time: 03/04/22  8:44 AM   Result Value Ref Range    Magnesium 2.2 1.6 - 2.4 mg/dL   METABOLIC PANEL, BASIC    Collection Time: 03/04/22  8:44 AM   Result Value Ref Range    Sodium 138 136 - 145 mmol/L    Potassium 4.1 3.5 - 5.1 mmol/L    Chloride 104 97 - 108 mmol/L    CO2 28 21 - 32 mmol/L    Anion gap 6 5 - 15 mmol/L    Glucose 95 65 - 100 mg/dL    BUN 29 (H) 6 - 20 mg/dL    Creatinine 1.11 0.70 - 1.30 mg/dL    BUN/Creatinine ratio 26 (H) 12 - 20      GFR est AA >60 >60 ml/min/1.73m2    GFR est non-AA >60 >60 ml/min/1.73m2    Calcium 8.9 8.5 - 10.1 mg/dL       chest X-ray    Assessment/     Patient Active Problem List   Diagnosis Code    CHF (congestive heart failure) ([facility]) I50.9    PNA (pneumonia) J18.9    Acute respiratory failure with hypoxia ([facility]) J96.01           81year-old gentleman, known history of diastolic CHF/chronic, persistent atrial fibrillation, CAD describing ACS approximately 4 months ago treated at [address] [place] [address], still smoking 1 pack of cigarettes daily, down from 2 packs to 3 months ago. Presents with acute onset of shortness of breath this morning, flash pulmonary edema more likely, pneumonia is a possibility, atypical, Covid is negative and he has been vaccinated and boosted. Initial EMS SPO2 reportedly in the 50s, improved to 100% on a nonrebreather mask where he remains currently appearing nonlabored in the emergency room. Diuresis initiated in the ED, does feel some improvement.   Admitting secondary to:  -Acute hypoxic respiratory failure, currently down to 2 L/M, respiratory status is improved per patient.  -Acute/chronic diastolic CHF + history of at least moderate pulmonary hypertension, component right heart failure, diuresing well, respirations improved, edema is improving. Cardiology is following  -Probable COPD, does not appear to be in exacerbation, pulmonology appreciated  -cad, negative troponin  -Hypertension is fairly controlled  -Covid not detected, patient is up-to-date with his Covid vaccinations.  -Pro-Ivan less than 0.5, leukocytosis questionably reactive, feeling at this is less likely bacterial pneumonia.  -Chronic tobacco abuse, currently he says less than 1 pack a day, previously up to 4 months ago was smoking 2 packs of cigarettes daily.         Plan:        -Continue diuresis, Lasix IV 40 mg twice daily, KVNG's and daily weights  -Continue coreg, lisinopril, prn ntp 1\"  -Continue duo nebs scheduled, albuterol prn, mucolytic's, vibratory therapy, resume symbicort. Solu-Medrol was stopped  -Wean O2 to maintain sats above 90%  -Oral intake/fluid moderation is encouraged at bedside  -Tobacco cessation is strongly encouraged, NicoDerm is offered though he declines.     VTEP: Lovenox  Full CODE STATUS  Disposition: Patient resides at [facility]. Will get PT/OT recommending HHC/PT/OT. anticipating a return to [address] [place] [address] discussed with: Patient/Family, Nurse,  and Consultant . Total time spent with patient: 30 minutes. This dictation was done by dragon, computer voice recognition software. Often unanticipated grammatical, syntax, phones and other interpretive errors are inadvertently transcribed. Please excuse errors that have escaped final proofreading.     Gary Tinajero MD

## 2022-03-05 LAB
ANION GAP SERPL CALC-SCNC: 6 MMOL/L (ref 5–15)
BASOPHILS # BLD: 0 K/UL (ref 0–0.1)
BASOPHILS NFR BLD: 0 % (ref 0–1)
BUN SERPL-MCNC: 35 MG/DL (ref 6–20)
BUN/CREAT SERPL: 29 (ref 12–20)
CA-I BLD-MCNC: 8.5 MG/DL (ref 8.5–10.1)
CHLORIDE SERPL-SCNC: 103 MMOL/L (ref 97–108)
CO2 SERPL-SCNC: 31 MMOL/L (ref 21–32)
CREAT SERPL-MCNC: 1.21 MG/DL (ref 0.7–1.3)
DIFFERENTIAL METHOD BLD: ABNORMAL
EOSINOPHIL # BLD: 0.5 K/UL (ref 0–0.4)
EOSINOPHIL NFR BLD: 4 % (ref 0–7)
ERYTHROCYTE [DISTWIDTH] IN BLOOD BY AUTOMATED COUNT: 20.2 % (ref 11.5–14.5)
GLUCOSE SERPL-MCNC: 92 MG/DL (ref 65–100)
HCT VFR BLD AUTO: 31.3 % (ref 36.6–50.3)
HGB BLD-MCNC: 8.7 G/DL (ref 12.1–17)
IMM GRANULOCYTES # BLD AUTO: 0 K/UL (ref 0–0.04)
IMM GRANULOCYTES NFR BLD AUTO: 0 % (ref 0–0.5)
LYMPHOCYTES # BLD: 2.7 K/UL (ref 0.8–3.5)
LYMPHOCYTES NFR BLD: 24 % (ref 12–49)
MCH RBC QN AUTO: 19.4 PG (ref 26–34)
MCHC RBC AUTO-ENTMCNC: 27.8 G/DL (ref 30–36.5)
MCV RBC AUTO: 69.7 FL (ref 80–99)
MONOCYTES # BLD: 1.4 K/UL (ref 0–1)
MONOCYTES NFR BLD: 12 % (ref 5–13)
NEUTS SEG # BLD: 7 K/UL (ref 1.8–8)
NEUTS SEG NFR BLD: 60 % (ref 32–75)
NRBC # BLD: 0 K/UL (ref 0–0.01)
NRBC BLD-RTO: 0 PER 100 WBC
PLATELET # BLD AUTO: 355 K/UL (ref 150–400)
PMV BLD AUTO: 10 FL (ref 8.9–12.9)
POTASSIUM SERPL-SCNC: 3.9 MMOL/L (ref 3.5–5.1)
RBC # BLD AUTO: 4.49 M/UL (ref 4.1–5.7)
SODIUM SERPL-SCNC: 140 MMOL/L (ref 136–145)
WBC # BLD AUTO: 11.6 K/UL (ref 4.1–11.1)

## 2022-03-05 PROCEDURE — 80048 BASIC METABOLIC PNL TOTAL CA: CPT

## 2022-03-05 PROCEDURE — 74011250637 HC RX REV CODE- 250/637: Performed by: INTERNAL MEDICINE

## 2022-03-05 PROCEDURE — 65270000029 HC RM PRIVATE

## 2022-03-05 PROCEDURE — 36415 COLL VENOUS BLD VENIPUNCTURE: CPT

## 2022-03-05 PROCEDURE — 85025 COMPLETE CBC W/AUTO DIFF WBC: CPT

## 2022-03-05 PROCEDURE — 94762 N-INVAS EAR/PLS OXIMTRY CONT: CPT

## 2022-03-05 RX ORDER — PRAVASTATIN SODIUM 20 MG/1
20 TABLET ORAL
Status: DISCONTINUED | OUTPATIENT
Start: 2022-03-05 | End: 2022-03-08 | Stop reason: HOSPADM

## 2022-03-05 RX ADMIN — PANTOPRAZOLE SODIUM 40 MG: 40 TABLET, DELAYED RELEASE ORAL at 08:59

## 2022-03-05 RX ADMIN — GUAIFENESIN 600 MG: 600 TABLET, EXTENDED RELEASE ORAL at 09:00

## 2022-03-05 RX ADMIN — ASPIRIN 81 MG: 81 TABLET, COATED ORAL at 08:59

## 2022-03-05 RX ADMIN — LISINOPRIL 5 MG: 5 TABLET ORAL at 08:59

## 2022-03-05 RX ADMIN — POTASSIUM CHLORIDE 20 MEQ: 1.5 POWDER, FOR SOLUTION ORAL at 09:00

## 2022-03-05 RX ADMIN — TRAZODONE HYDROCHLORIDE 50 MG: 50 TABLET ORAL at 23:09

## 2022-03-05 RX ADMIN — PRAVASTATIN SODIUM 20 MG: 20 TABLET ORAL at 23:09

## 2022-03-05 RX ADMIN — FUROSEMIDE 40 MG: 40 TABLET ORAL at 17:09

## 2022-03-05 RX ADMIN — POTASSIUM CHLORIDE 20 MEQ: 1.5 POWDER, FOR SOLUTION ORAL at 17:09

## 2022-03-05 RX ADMIN — ACETAMINOPHEN 650 MG: 325 TABLET ORAL at 12:58

## 2022-03-05 RX ADMIN — CARVEDILOL 3.12 MG: 3.12 TABLET, FILM COATED ORAL at 09:00

## 2022-03-05 RX ADMIN — ACETAMINOPHEN 650 MG: 325 TABLET ORAL at 23:09

## 2022-03-05 RX ADMIN — GUAIFENESIN 600 MG: 600 TABLET, EXTENDED RELEASE ORAL at 23:09

## 2022-03-05 RX ADMIN — CARVEDILOL 3.12 MG: 3.12 TABLET, FILM COATED ORAL at 17:09

## 2022-03-05 RX ADMIN — FUROSEMIDE 40 MG: 40 TABLET ORAL at 08:59

## 2022-03-05 NOTE — PROGRESS NOTES
Consult  Pulmonary, Critical Care    Name: Vinicius Colón MRN: [id number]   : 1940 Hospital: [facility]   Date: 3/5/2022  Admission date: 3/2/2022 Hospital Day: 4       Subjective/Interval History:   Seen on the medical floor. Was admitted yesterday with shortness of breath peripheral edema states he feels much better today. Has a jug of water in front of him states he is trying to flush all excess water out. Has not had any fever chills or sweats sputum that he coughs up is relatively clear. He was a smoker of a pack a day from age of 7 until about 3 months ago he states he hasn't smoked since then  3/4 feels much better respirations nonlabored still on nasal oxygen have placed on room air with saturation 97%  3/5 no specific complaints. Overnight oximetry confirms significant oxygen desaturation. Currently on room air lying in bed saturation 94% on room air    Hospital Problems  Date Reviewed: 2021          Codes Class Noted POA    Acute respiratory failure with hypoxia [facility]) ICD-10-CM: J96.01  ICD-9-CM: 518.81  3/2/2022 Unknown              IMPRESSION:   1. Acute hypoxic respiratory failure desaturation at night he will need home oxygen  2. Pulmonary edema cleared on clinical exam  3. Diastolic left ventricular dysfunction  4. Possible underlying COPD  5. Anemia  6. Atrial fibrillation  7. Pulmonary hypertension  8. Probable  Body mass index is 22.69 kg/m². 9.       RECOMMENDATIONS/PLAN:     1. No wheezing on as needed nebulizer with no steroids  2. Overnight oximetry confirms nocturnal desaturation we will need home oxygen 2 L  3. Lasix now oral  4. Have encouraged him to remain off cigarettes          [x] High complexity decision making was performed  [x] See my orders for details      Subjective/Initial History:     I was asked by Aysha Persaud MD to see Vinicius Colón  a 81 y.o.    male in consultation for a chief complaint of hypoxic respiratory failure pulmonary edema possible COPD      No Known Allergies     MAR reviewed and pertinent medications noted or modified as needed     Current Facility-Administered Medications   Medication    pravastatin (PRAVACHOL) tablet 20 mg    potassium chloride (KLOR-CON) packet for solution 20 mEq    albuterol-ipratropium (DUO-NEB) 2.5 MG-0.5 MG/3 ML    furosemide (LASIX) tablet 40 mg    nitroglycerin (NITROSTAT) tablet 0.4 mg    lisinopriL (PRINIVIL, ZESTRIL) tablet 5 mg    carvediloL (COREG) tablet 3.125 mg    ondansetron (ZOFRAN) injection 4 mg    acetaminophen (TYLENOL) tablet 650 mg    nitroglycerin (NITROBID) 2 % ointment 1 Inch    docusate sodium (COLACE) capsule 100 mg    guaiFENesin ER (MUCINEX) tablet 600 mg    nicotine (NICODERM CQ) 21 mg/24 hr patch 1 Patch    aspirin delayed-release tablet 81 mg    albuterol (PROVENTIL VENTOLIN) nebulizer solution 1.25 mg    pantoprazole (PROTONIX) tablet 40 mg    traZODone (DESYREL) tablet 50 mg      Patient PCP: Yessica Dunaway MD  PMH:  has a past medical history of GERD (gastroesophageal reflux disease) and Hypertension. PSH:   has no past surgical history on file. FHX: family history is not on file. SHX:  reports that he has been smoking. He tells me he hasn't smoked in 3-month he has been smoking about 2.00 packs per day.  He has never used smokeless tobacco.     Systemic review:  General no weight loss fever chills or sweats   Eyes no double vision no momentary loss of vision  ENT no facial pain over the sinuses or drainage  Endocrinologic no polyuria polydipsia  Musculoskeletal no swollen tender joints he did notice worsening edema for the last month  Neurologic no seizures or syncope  Gastrointestinal no nausea vomiting acid indigestion sour bitter taste  Genitourinary no pain or discomfort on urination no bleeding  Cardiovascular history of heart disease had a stent in the past denies chest pain diaphoresis  Respiratory Kev[place] clear sputum no fever chills or sweats was a smoker states he stopped 3 months ago    Objective:     Vital Signs: Telemetry:    AFIB Intake/Output:   Visit Vitals  BP (!) 147/78 (BP 1 Location: Left upper arm, BP Patient Position: At rest;Supine)   Pulse 75   Temp 97.7 °F (36.5 °C)   Resp 24   Ht 5' 11\" (1.803 m)   Wt 73.8 kg (162 lb 11.2 oz)   SpO2 94%   BMI 22.69 kg/m²       Temp (24hrs), Av.8 °F (36.6 °C), Min:97.3 °F (36.3 °C), Max:98.1 °F (36.7 °C)        O2 Device: None (Room air) O2 Flow Rate (L/min): 2 l/min       Wt Readings from Last 4 Encounters:   22 73.8 kg (162 lb 11.2 oz)   21 80.1 kg (176 lb 9.4 oz)          Intake/Output Summary (Last 24 hours) at 3/5/2022 1230  Last data filed at 3/5/2022 5695  Gross per 24 hour   Intake    Output 1600 ml   Net -1600 ml       Last shift:      No intake/output data recorded. Last 3 shifts:  1901 -  0700  In: -   Out: 5100 [Urine:5100]       Physical Exam:   General:  male; lying in bed no distress no accessory muscle use on room air  HEENT: NCAT,   Eyes: anicteric; conjunctiva clear extraocular movements intact  Neck: no nodes, neck veins are flat with him sitting upright, no accessory MM use. Chest: no deformity,  Cardiac: Irregular rhythm rate controlled no chest pain  Lungs: Clear anterior and laterally with only rare rales in the bases  Abd: Soft nontender normal bowel sounds  Ext: 1+ edema; no joint swelling;  No clubbing  :clear urine  Neuro: Awake alert oriented speech is clear moves all 4 extremities  Psych- no agitation, oriented to person;  Skin: warm, dry, no cyanosis;   Pulses: Brachial and radial pulses intact  Capillary: Normal capillary refill    Labs:    Recent Labs     22  0657 22  0844 22  0637   WBC 11.6* 15.8* 7.4   HGB 8.7* 7.7* 7.5*    317 284     Recent Labs     22  0657 22  0844 22  0637    138  138 137   K 3.9 4.1  3.9 3.5    104  105 106   CO2 31 28  28 25   GLU 92 95  98 165*   BUN 35* 29*  29* 20   CREA 1.21 1.11  1.13 1.03   CA 8.5 8.9  9.0 9.2   MG  --  2.2 2.0   PHOS  --  4.1  --    ALB  --  3.4*  --    3/5 overnight oximetry shows low oxygen saturation 77% with 1 hour 35 minutes below 88% at which point he was placed on 2 L nasal oxygen   procalcitonin less than 0.05  BNP 1823  COVID-19 antigen negative  11/18/2021 echo ejection fraction 28% grade 2 diastolic dysfunction RVSP 62 moderate mitral regurg mild to moderate aortic regurg IVC dilated    Lab Results   Component Value Date/Time    Culture result:  11/15/2021 12:00 PM     One of four bottles has been flagged positive by instrument. Bottle has been sent to [facility] laboratory to assess for possible growth. Gram Positive Cocci in clusters CALLED TO AND READ BACK BY Adria Peralta 11/18/2021 BY DPW    Culture result:  11/15/2021 12:00 PM     Staphylococcus species, coagulase negative GROWING IN THE ANAEROBIC BOTTLE NO SITE INDICATED     Imaging:    CXR Results  (Last 48 hours)    None        Results from [place] encounter on 03/02/22    XR CHEST PORT    Narrative  1 view comparison yesterday    Minimal improvement of congestion and edema. Right pleural effusion may be  improved as well. No pneumothorax      XR CHEST PORT    Narrative  Chest single view. Comparison single view chest November 15, 2021. Patchy reticular markings through lungs. These are most dense through lung  bases. Obscuration each hemidiaphragm. Moderate volume dependent right pleural  effusion and small to moderate volume dependent left pleural effusion. Cardiac  and mediastinal structures unchanged. No pneumothorax. Findings advanced compared to prior imaging. Results from [place] encounter on 11/15/21    XR CHEST SNGL V    Narrative  Portable upright radiograph chest 10:02 a.m.. No prior study. INDICATION: Shortness of breath.     Heart size is at the upper limits of normal.. There are bilateral pleural  effusions with bilateral diffuse airspace disease or edema. Multiple right rib  fractures. No pneumothorax. Degenerative changes of the shoulders and spine. Impression  1. Bilateral pleural effusions with bilateral airspace disease or edema. 2. Multiple right-sided rib fractures, probably old, recommend follow-up as  warranted. Results from [place] encounter on 03/02/22    CT CHEST WO CONT    Narrative  CT chest, 3/2/2022    History: Pneumonia. Comparison: Including chest radiograph, same day. Technique: No intravenous contrast was administered. Multiple contiguous axial  images were acquired from the thoracic inlet to the diaphragm. Sagittal,  coronal and axial MIP reconstruction of images was made. All CT scans at this facility are performed using dose reduction optimization  techniques as appropriate to perform the exam including the following: Automated  exposure control, adjustments of the mA and/or kV according to patient size, or  use iterative reconstruction technique. Findings: The thyroid gland has no focal abnormality. Mediastinal lymph nodes are noted measuring up to 1.2 cm in short axis. Evaluation of the radha is limited on this noncontrast enhanced study. The heart is borderline enlarged. Visualization of the interventricular septum  suggests underlying anemia. Clinical correlation is recommended. Coronary  artery calcifications are seen. Small pericardial effusion is noted. The thoracic aorta has atherosclerotic plaque and measures 2.9 cm in the distal  arch. The main pulmonary artery is 3.0 cm. Calcified pleural plaque is seen. Moderate right and small-to-moderate left pleural effusions are present. Associated compressive atelectasis in the lower lobes is seen. Milder degree of  atelectasis is seen in the lingula and dependent upper lobes. There are groundglass opacities in the lungs. Hydrostatic edema is suspected.     Superimposed infection may or may not be present. Calcified granulomas are noted. A 5 mm right-sided perifissural nodule is  benign appearing. Nonacute appearing rib deformities are noted. Degenerative changes are present  in the thoracic spine. The included liver, spleen show no gross abnormality on this noncontrast  enhanced study. The included adrenal glands appear thickened. Impression  Pleural effusions and associated compressive atelectasis. Hydrostatic edema suspected. Other findings as above. Discussion: Acute pulmonary edema secondary to diastolic dysfunction and pulmonary hypertension with cor pulmonale. Good diuresis and the chest x-ray minimally improved. With continue Lasix. No wheezing will discontinue Solu-Medrol continue nebulized albuterol and Atrovent for the time being  3/4 feels good lungs are clear except for just a few rales in the bases. Will discontinue nebulized albuterol Atrovent and Symbicort. We'll switch Lasix to oral.  Check overnight oximetry on room air to see if nocturnal desaturation prior. Likely discharge tomorrow  3/5 overnight oximetry shows desaturation he will need home oxygen 2 L for nocturnal use. No wheezing was heard would be inclined to have him use Symbicort inhaler twice daily at home.   Creatinine mildly elevated was switched to oral Lasix yesterday

## 2022-03-05 NOTE — PROGRESS NOTES
Problem: Falls - Risk of  Goal: *Absence of Falls  Description: Document Luis Alberto Lucien Fall Risk and appropriate interventions in the flowsheet.   Outcome: Progressing Towards Goal  Note: Fall Risk Interventions:  Mobility Interventions: Bed/chair exit alarm,Patient to call before getting OOB         Medication Interventions: Bed/chair exit alarm,Patient to call before getting OOB,Teach patient to arise slowly    Elimination Interventions: Bed/chair exit alarm,Call light in reach,Urinal in reach    History of Falls Interventions: Bed/chair exit alarm         Problem: Patient Education: Go to Patient Education Activity  Goal: Patient/Family Education  Outcome: Progressing Towards Goal     Problem: Patient Education: Go to Patient Education Activity  Goal: Patient/Family Education  Outcome: Progressing Towards Goal

## 2022-03-05 NOTE — PROGRESS NOTES
Progress Note    Patient: Nigel Heredia MRN: [id number]  SSN: [id number]    YOB: 1940  Age: 81 y.o. Sex: male      Admit Date: 3/2/2022    LOS: 3 days     Subjective:     No acute events overnight. He walked with physical therapy yesterday. Objective:     Vitals:    03/04/22 1933 03/04/22 2310 03/05/22 0348 03/05/22 0758   BP: (!) 161/73 (!) 152/70 (!) 165/71 (!) 153/70   Pulse: 62 60 (!) 54 65   Resp: 20 20 20 23   Temp: 98 °F (36.7 °C) 97.3 °F (36.3 °C) 98.1 °F (36.7 °C) 97.9 °F (36.6 °C)   SpO2: 96% 91% 96% 93%   Weight:       Height:            Intake and Output:  Current Shift: No intake/output data recorded. Last three shifts: 03/03 1901 - 03/05 0700  In: -   Out: 5100 [Urine:5100]    Physical Exam:   General:  Alert, cooperative, no distress, appears stated age. Eyes:  Conjunctivae/corneas clear. PERRL, EOMs intact. Fundi benign   Ears:  Normal TMs and external ear canals both ears. Nose: Nares normal. Septum midline. Mucosa normal. No drainage or sinus tenderness. Mouth/Throat: Lips, mucosa, and tongue normal. Teeth and gums normal.   Neck: Supple, symmetrical, trachea midline, no adenopathy, thyroid: no enlargment/tenderness/nodules, no carotid bruit and no JVD. Back:   Symmetric, no curvature. ROM normal. No CVA tenderness. Lungs:   Clear to auscultation bilaterally. Heart:  Regular rate and rhythm, S1, S2 normal, no murmur, click, rub or gallop. Abdomen:   Soft, non-tender. Bowel sounds normal. No masses,  No organomegaly. Extremities: Extremities normal, atraumatic, no cyanosis or edema. Pulses: 2+ and symmetric all extremities. Skin: Skin color, texture, turgor normal. No rashes or lesions   Lymph nodes: Cervical, supraclavicular, and axillary nodes normal.   Neurologic: CNII-XII intact. Normal strength, sensation and reflexes throughout. Lab/Data Review: All lab results for the last 24 hours reviewed.          Assessment:     Active Problems: Acute respiratory failure with hypoxia ([facility] [address] (3/2/2022)    Patient is an 50-year-old white male with:  1.  Atrial fibrillation with rapid ventricular rate  2.  Alcohol abuse  3.  Nicotine dependence  4.  Coronary artery status post PCI  5.  Hyperlipidemia  6.  Frequent falls  7.  Heart failure with preserved ejection fraction  8.  Anemia       Plan:     Urine output is 5.1 L overnight. No accurate charting for input. Creatinine 1.2, BUN of 35. I will switch him to oral Lasix. Okay to discharge from cardiac standpoint. I'll be happy to see him in 2 to 4 weeks after discharge or sooner if needed. Currently on aspirin, carvedilol and lisinopril. He is not a candidate for anticoagulation. Currently in atrial fibrillation with controlled ventricular rate. Add statin therapy.     Signed By: Sonny Matias MD     March 5, 2022

## 2022-03-05 NOTE — PROGRESS NOTES
Hospitalist Progress Note               Daily Progress Note: 15/2022  4year-old gentleman with a history of CAD, hypertension, GERD, persistent atrial fibrillation, COPD presented to the emergency department secondary to shortness of breath. Shortness of breath woke him up on the date of presentation. Hypoxic when EMS arrived in the 50s, responded well to nonrebreather mask and brought in for evaluation. Diuresis initiated in the ED, feeling some improvement, admitted secondary to acute hypoxic respiratory failure, acute on chronic diastolic CHF with a history of at least moderate pulmonary hypertension, component of right heart failure and possible COPD exacerbation. Subjective: The patient is seen for follow  up.    No cp, sob better  Tolerant now of room air, weaned off O2 this afternoon  Goo uop- switched to oral lasix    Problem List:  Problem List as of 3/5/2022 Date Reviewed: 11/17/2021          Codes Class Noted - Resolved    Acute respiratory failure with hypoxia [facility]) ICD-10-CM: J96.01  ICD-9-CM: 518.81  3/2/2022 - Present        CHF (congestive heart failure) ([facility] [address] ICD-10-CM: I50.9  ICD-9-CM: 428.0  11/15/2021 - Present        PNA (pneumonia) ICD-10-CM: J18.9  ICD-9-CM: 486  11/15/2021 - Present              Medications reviewed  Current Facility-Administered Medications   Medication Dose Route Frequency    pravastatin (PRAVACHOL) tablet 20 mg  20 mg Oral QHS    potassium chloride (KLOR-CON) packet for solution 20 mEq  20 mEq Oral BID WITH MEALS    albuterol-ipratropium (DUO-NEB) 2.5 MG-0.5 MG/3 ML  3 mL Nebulization Q6H PRN    furosemide (LASIX) tablet 40 mg  40 mg Oral BID    nitroglycerin (NITROSTAT) tablet 0.4 mg  0.4 mg SubLINGual Q5MIN PRN    lisinopriL (PRINIVIL, ZESTRIL) tablet 5 mg  5 mg Oral DAILY    carvediloL (COREG) tablet 3.125 mg  3.125 mg Oral BID WITH MEALS    ondansetron (ZOFRAN) injection 4 mg  4 mg IntraVENous Q6H PRN    acetaminophen (TYLENOL) tablet 650 mg  650 mg Oral Q6H PRN    nitroglycerin (NITROBID) 2 % ointment 1 Inch  1 Inch Topical Q6H PRN    docusate sodium (COLACE) capsule 100 mg  100 mg Oral PRN    guaiFENesin ER (MUCINEX) tablet 600 mg  600 mg Oral Q12H    nicotine (NICODERM CQ) 21 mg/24 hr patch 1 Patch  1 Patch TransDERmal DAILY PRN    aspirin delayed-release tablet 81 mg  81 mg Oral DAILY    albuterol (PROVENTIL VENTOLIN) nebulizer solution 1.25 mg  1.25 mg Nebulization Q4H PRN    pantoprazole (PROTONIX) tablet 40 mg  40 mg Oral DAILY    traZODone (DESYREL) tablet 50 mg  50 mg Oral QHS PRN       Review of Systems:   A comprehensive review of systems was negative except for that written in the HPI. Objective:   Physical Exam:     Visit Vitals  BP (!) 144/73 (BP 1 Location: Left upper arm, BP Patient Position: At rest;Supine)   Pulse 74   Temp 98.8 °F (37.1 °C)   Resp 21   Ht 5' 11\" (1.803 m)   Wt 73.8 kg (162 lb 11.2 oz)   SpO2 95%   BMI 22.69 kg/m²    O2 Flow Rate (L/min): 2 l/min O2 Device: None (Room air)    Temp (24hrs), Av °F (36.7 °C), Min:97.3 °F (36.3 °C), Max:98.8 °F (37.1 °C)    701 - 1900  In: -   Out: 725 [Urine:725]   1901 -  07  In: -   Out: 5100 [Urine:5100]    General:  Alert, cooperative, no distress, appears stated age. Head:  Normocephalic, without obvious abnormality, atraumatic. Eyes:  Conjunctivae/corneas clear. EOMs intact. Throat: Moist oral mucosa   Neck: Supple, symmetrical, trachea midline, no adenopathy, no JVD. Lungs:    Diminished diffusely, no wheeze, not labored, few scattered rhonchi. Chest wall:  No tenderness or deformity. Heart:   Irregular, heart rate 91, S1, S2 normal, no murmur, click, rub or gallop. Abdomen:   Soft, non-tender. , not distended, Bowel sounds present,  No rebound or guarding. Extremities: Extremities normal, atraumatic, no cyanosis. 1-2+ edema, improving   Pulses: 2+ and symmetric all extremities. Skin: Warm,dry   Neurologic: CNII-XII intact.  No motor or sensory deficits. Data Review:       Recent Days:  Recent Labs     03/05/22  0657 03/04/22  0844 03/03/22  0637   WBC 11.6* 15.8* 7.4   HGB 8.7* 7.7* 7.5*   HCT 31.3* 27.0* 26.6*    317 284     Recent Labs     03/05/22  0657 03/04/22  0844 03/03/22  0637    138  138 137   K 3.9 4.1  3.9 3.5    104  105 106   CO2 31 28  28 25   GLU 92 95  98 165*   BUN 35* 29*  29* 20   CREA 1.21 1.11  1.13 1.03   CA 8.5 8.9  9.0 9.2   MG  --  2.2 2.0   PHOS  --  4.1  --    ALB  --  3.4*  --      No results for input(s): PH, PCO2, PO2, HCO3, FIO2 in the last 72 hours. 24 Hour Results:  Recent Results (from the past 24 hour(s))   METABOLIC PANEL, BASIC    Collection Time: 03/05/22  6:57 AM   Result Value Ref Range    Sodium 140 136 - 145 mmol/L    Potassium 3.9 3.5 - 5.1 mmol/L    Chloride 103 97 - 108 mmol/L    CO2 31 21 - 32 mmol/L    Anion gap 6 5 - 15 mmol/L    Glucose 92 65 - 100 mg/dL    BUN 35 (H) 6 - 20 mg/dL    Creatinine 1.21 0.70 - 1.30 mg/dL    BUN/Creatinine ratio 29 (H) 12 - 20      GFR est AA >60 >60 ml/min/1.73m2    GFR est non-AA 58 (L) >60 ml/min/1.73m2    Calcium 8.5 8.5 - 10.1 mg/dL   CBC WITH AUTOMATED DIFF    Collection Time: 03/05/22  6:57 AM   Result Value Ref Range    WBC 11.6 (H) 4.1 - 11.1 K/uL    RBC 4.49 4. 10 - 5.70 M/uL    HGB 8.7 (L) 12.1 - 17.0 g/dL    HCT 31.3 (L) 36.6 - 50.3 %    MCV 69.7 (L) 80.0 - 99.0 FL    MCH 19.4 (L) 26.0 - 34.0 PG    MCHC 27.8 (L) 30.0 - 36.5 g/dL    RDW 20.2 (H) 11.5 - 14.5 %    PLATELET 193 331 - 146 K/uL    MPV 10.0 8.9 - 12.9 FL    NRBC 0.0 0.0  WBC    ABSOLUTE NRBC 0.00 0.00 - 0.01 K/uL    NEUTROPHILS 60 32 - 75 %    LYMPHOCYTES 24 12 - 49 %    MONOCYTES 12 5 - 13 %    EOSINOPHILS 4 0 - 7 %    BASOPHILS 0 0 - 1 %    IMMATURE GRANULOCYTES 0 0 - 0.5 %    ABS. NEUTROPHILS 7.0 1.8 - 8.0 K/UL    ABS. LYMPHOCYTES 2.7 0.8 - 3.5 K/UL    ABS. MONOCYTES 1.4 (H) 0.0 - 1.0 K/UL    ABS. EOSINOPHILS 0.5 (H) 0.0 - 0.4 K/UL    ABS. BASOPHILS 0.0 0.0 - 0.1 K/UL    ABS. IMM. GRANS. 0.0 0.00 - 0.04 K/UL    DF AUTOMATED         chest X-ray    Assessment/     Patient Active Problem List   Diagnosis Code    CHF (congestive heart failure) (HCC) I50.9    PNA (pneumonia) J18.9    Acute respiratory failure with hypoxia ([facility] [address] J96.01           81year-old gentleman, known history of diastolic CHF/chronic, persistent atrial fibrillation, CAD describing ACS approximately 4 months ago treated at [facility], still smoking 1 pack of cigarettes daily, down from 2 packs to 3 months ago. Presents with acute onset of shortness of breath this morning, flash pulmonary edema more likely, pneumonia is a possibility, atypical, Covid is negative and he has been vaccinated and boosted. Initial EMS SPO2 reportedly in the 50s, improved to 100% on a nonrebreather mask where he remains currently appearing nonlabored in the emergency room. Diuresis initiated in the ED, does feel some improvement. Admitting secondary to:  -Acute hypoxic respiratory failure, currently weaned to room air, respiratory status is improved per patient.  -Acute/chronic diastolic CHF + history of at least moderate pulmonary hypertension, component right heart failure, diuresing well, respirations improved, edema is improving. Cardiology is following. Lasix to po. Clear for dc per cardio. -Probable COPD, does not appear to be in exacerbation, pulmonology appreciated. Overnight oximetry indicates need for supplemental oxygen.   -cad, negative troponin  -Hypertension is fairly controlled  -Covid not detected, patient is up-to-date with his Covid vaccinations.  -Pro-Ivan less than 0.5, leukocytosis questionably reactive, feeling at this is less likely bacterial pneumonia.  -Chronic tobacco abuse, currently he says less than 1 pack a day, previously up to 4 months ago was smoking 2 packs of cigarettes daily.  Advised to quit smoking especialy now with recommended home O2 supplementation.       Plan:      -Continue diuresis, switched to po lasix  -Continue coreg, lisinopril, prn ntp 1\"  -Continue duo nebs scheduled, albuterol prn, mucolytic's, vibratory therapy, resumed symbicort. Solu-Medrol was stopped  -Wean O2 to maintain sats above 90%  -overnight oximetry indicates need for supplemental o2, needs to be arranged.   -Oral intake/fluid moderation is encouraged at bedside.  -Tobacco cessation is strongly encouraged, NicoDerm is offered though he declines.       VTEP: Lovenox  Full CODE STATUS  Disposition: Patient resides at [facility]. PT/OT recommending HHC/PT/OT. Cardio cleared for dc, will need home o2 arranged + [facility]hc sn/pt/ot. Care Plan discussed with: Patient/Family, Nurse,  and Consultant . Total time spent with patient: 30 minutes. This dictation was done by dragon, computer voice recognition software. Often unanticipated grammatical, syntax, phones and other interpretive errors are inadvertently transcribed. Please excuse errors that have escaped final proofreading.     Rach Austin MD

## 2022-03-05 NOTE — PROGRESS NOTES
Problem: Falls - Risk of  Goal: *Absence of Falls  Description: Document Cuate Smith Fall Risk and appropriate interventions in the flowsheet.   Outcome: Progressing Towards Goal  Note: Fall Risk Interventions:  Mobility Interventions: Bed/chair exit alarm         Medication Interventions: Bed/chair exit alarm    Elimination Interventions: Call light in reach,Urinal in reach    History of Falls Interventions: Bed/chair exit alarm         Problem: Patient Education: Go to Patient Education Activity  Goal: Patient/Family Education  Outcome: Progressing Towards Goal

## 2022-03-06 ENCOUNTER — APPOINTMENT (OUTPATIENT)
Dept: NON INVASIVE DIAGNOSTICS | Age: 82
DRG: 291 | End: 2022-03-06
Attending: INTERNAL MEDICINE
Payer: MEDICARE

## 2022-03-06 PROBLEM — I10 HYPERTENSION: Status: ACTIVE | Noted: 2022-03-06

## 2022-03-06 PROBLEM — Z99.81 REQUIRES CONTINUOUS AT HOME SUPPLEMENTAL OXYGEN: Status: ACTIVE | Noted: 2022-03-06

## 2022-03-06 PROBLEM — I50.33 DIASTOLIC CHF, ACUTE ON CHRONIC (HCC): Status: ACTIVE | Noted: 2022-03-06

## 2022-03-06 PROBLEM — K21.9 GERD (GASTROESOPHAGEAL REFLUX DISEASE): Status: ACTIVE | Noted: 2022-03-06

## 2022-03-06 LAB
ECHO AO ASC DIAM: 3.3 CM
ECHO AO ASCENDING AORTA INDEX: 1.71 CM/M2
ECHO AO ROOT DIAM: 3.7 CM
ECHO AO ROOT INDEX: 1.92 CM/M2
ECHO AR MAX VEL PISA: 3.8 M/S
ECHO AV MEAN GRADIENT: 6 MMHG
ECHO AV MEAN VELOCITY: 1.1 M/S
ECHO AV PEAK GRADIENT: 13 MMHG
ECHO AV PEAK VELOCITY: 1.8 M/S
ECHO AV REGURGITANT PHT: 515 MS
ECHO AV VELOCITY RATIO: 1.22
ECHO AV VTI: 30.6 CM
ECHO EST RA PRESSURE: 3 MMHG
ECHO LA AREA 2C: 23.5 CM2
ECHO LA AREA 4C: 21.3 CM2
ECHO LA DIAMETER INDEX: 2.49 CM/M2
ECHO LA DIAMETER: 4.8 CM
ECHO LA MAJOR AXIS: 5.6 CM
ECHO LA MINOR AXIS: 6.2 CM
ECHO LA TO AORTIC ROOT RATIO: 1.3
ECHO LA VOL BP: 72 ML (ref 18–58)
ECHO LA VOL/BSA BIPLANE: 37 ML/M2 (ref 16–34)
ECHO LV E' LATERAL VELOCITY: 14 CM/S
ECHO LV E' SEPTAL VELOCITY: 6 CM/S
ECHO LV EDV A2C: 60 ML
ECHO LV EDV A4C: 76 ML
ECHO LV EDV INDEX A4C: 39 ML/M2
ECHO LV EDV NDEX A2C: 31 ML/M2
ECHO LV EJECTION FRACTION A2C: 42 %
ECHO LV EJECTION FRACTION A4C: 45 %
ECHO LV EJECTION FRACTION BIPLANE: 43 % (ref 55–100)
ECHO LV ESV A2C: 35 ML
ECHO LV ESV A4C: 42 ML
ECHO LV ESV INDEX A2C: 18 ML/M2
ECHO LV ESV INDEX A4C: 22 ML/M2
ECHO LV FRACTIONAL SHORTENING: 28 % (ref 28–44)
ECHO LV INTERNAL DIMENSION DIASTOLE INDEX: 2.07 CM/M2
ECHO LV INTERNAL DIMENSION DIASTOLIC: 4 CM (ref 4.2–5.9)
ECHO LV INTERNAL DIMENSION SYSTOLIC INDEX: 1.5 CM/M2
ECHO LV INTERNAL DIMENSION SYSTOLIC: 2.9 CM
ECHO LV IVSD: 1.4 CM (ref 0.6–1)
ECHO LV MASS 2D: 186.5 G (ref 88–224)
ECHO LV MASS INDEX 2D: 96.7 G/M2 (ref 49–115)
ECHO LV POSTERIOR WALL DIASTOLIC: 1.2 CM (ref 0.6–1)
ECHO LV RELATIVE WALL THICKNESS RATIO: 0.6
ECHO LVOT AV VTI INDEX: 1.2
ECHO LVOT MEAN GRADIENT: 8 MMHG
ECHO LVOT PEAK GRADIENT: 20 MMHG
ECHO LVOT PEAK VELOCITY: 2.2 M/S
ECHO LVOT VTI: 36.6 CM
ECHO MV A VELOCITY: 0.67 M/S
ECHO MV E VELOCITY: 0.95 M/S
ECHO MV E/A RATIO: 1.42
ECHO MV E/E' LATERAL: 6.79
ECHO MV E/E' RATIO (AVERAGED): 11.31
ECHO MV E/E' SEPTAL: 15.83
ECHO MV REGURGITANT PEAK GRADIENT: 100 MMHG
ECHO MV REGURGITANT PEAK VELOCITY: 5 M/S
ECHO MV REGURGITANT VTIA: 172 CM
ECHO PULMONARY ARTERY END DIASTOLIC PRESSURE: 8 MMHG
ECHO PV MAX VELOCITY: 1 M/S
ECHO PV MEAN GRADIENT: 2 MMHG
ECHO PV MEAN VELOCITY: 0.7 M/S
ECHO PV PEAK GRADIENT: 4 MMHG
ECHO PV REGURGITANT MAX VELOCITY: 1.4 M/S
ECHO PV VTI: 16.9 CM
ECHO RIGHT VENTRICULAR SYSTOLIC PRESSURE (RVSP): 48 MMHG
ECHO RV BASAL DIMENSION: 4.5 CM
ECHO RV MID DIMENSION: 3.9 CM
ECHO TV REGURGITANT MAX VELOCITY: 3.34 M/S
ECHO TV REGURGITANT PEAK GRADIENT: 45 MMHG

## 2022-03-06 PROCEDURE — 93306 TTE W/DOPPLER COMPLETE: CPT

## 2022-03-06 PROCEDURE — 74011250637 HC RX REV CODE- 250/637: Performed by: INTERNAL MEDICINE

## 2022-03-06 PROCEDURE — 65270000029 HC RM PRIVATE

## 2022-03-06 RX ORDER — FUROSEMIDE 40 MG/1
40 TABLET ORAL DAILY
Qty: 30 TABLET | Refills: 0 | Status: SHIPPED | OUTPATIENT
Start: 2022-03-06 | End: 2022-04-05

## 2022-03-06 RX ORDER — ASPIRIN 81 MG/1
81 TABLET ORAL DAILY
Qty: 30 TABLET | Refills: 0 | Status: SHIPPED | OUTPATIENT
Start: 2022-03-07 | End: 2022-04-06

## 2022-03-06 RX ORDER — CARVEDILOL 3.12 MG/1
3.12 TABLET ORAL 2 TIMES DAILY WITH MEALS
Qty: 60 TABLET | Refills: 0 | Status: SHIPPED | OUTPATIENT
Start: 2022-03-06 | End: 2022-04-05

## 2022-03-06 RX ORDER — PRAVASTATIN SODIUM 20 MG/1
20 TABLET ORAL
Qty: 30 TABLET | Refills: 0 | Status: SHIPPED | OUTPATIENT
Start: 2022-03-06 | End: 2022-04-05

## 2022-03-06 RX ORDER — GUAIFENESIN 600 MG/1
600 TABLET, EXTENDED RELEASE ORAL EVERY 12 HOURS
Qty: 10 TABLET | Refills: 0 | Status: SHIPPED | OUTPATIENT
Start: 2022-03-06 | End: 2022-03-11

## 2022-03-06 RX ORDER — ACETAMINOPHEN 325 MG/1
650 TABLET ORAL
Qty: 30 TABLET | Refills: 0 | Status: SHIPPED
Start: 2022-03-06

## 2022-03-06 RX ADMIN — ASPIRIN 81 MG: 81 TABLET, COATED ORAL at 09:05

## 2022-03-06 RX ADMIN — PANTOPRAZOLE SODIUM 40 MG: 40 TABLET, DELAYED RELEASE ORAL at 09:05

## 2022-03-06 RX ADMIN — PRAVASTATIN SODIUM 20 MG: 20 TABLET ORAL at 20:36

## 2022-03-06 RX ADMIN — CARVEDILOL 3.12 MG: 3.12 TABLET, FILM COATED ORAL at 09:05

## 2022-03-06 RX ADMIN — ACETAMINOPHEN 650 MG: 325 TABLET ORAL at 06:23

## 2022-03-06 RX ADMIN — LISINOPRIL 5 MG: 5 TABLET ORAL at 09:05

## 2022-03-06 RX ADMIN — ACETAMINOPHEN 650 MG: 325 TABLET ORAL at 15:41

## 2022-03-06 RX ADMIN — ACETAMINOPHEN 650 MG: 325 TABLET ORAL at 20:37

## 2022-03-06 RX ADMIN — TRAZODONE HYDROCHLORIDE 50 MG: 50 TABLET ORAL at 20:37

## 2022-03-06 RX ADMIN — GUAIFENESIN 600 MG: 600 TABLET, EXTENDED RELEASE ORAL at 20:36

## 2022-03-06 RX ADMIN — FUROSEMIDE 40 MG: 40 TABLET ORAL at 09:05

## 2022-03-06 RX ADMIN — FUROSEMIDE 40 MG: 40 TABLET ORAL at 15:42

## 2022-03-06 RX ADMIN — CARVEDILOL 3.12 MG: 3.12 TABLET, FILM COATED ORAL at 15:42

## 2022-03-06 RX ADMIN — GUAIFENESIN 600 MG: 600 TABLET, EXTENDED RELEASE ORAL at 09:05

## 2022-03-06 NOTE — PROGRESS NOTES
Problem: Falls - Risk of  Goal: *Absence of Falls  Description: Document Nini Bird Fall Risk and appropriate interventions in the flowsheet.   Outcome: Progressing Towards Goal  Note: Fall Risk Interventions:  Mobility Interventions: Bed/chair exit alarm,Patient to call before getting OOB         Medication Interventions: Bed/chair exit alarm,Patient to call before getting OOB,Teach patient to arise slowly    Elimination Interventions: Bed/chair exit alarm,Call light in reach,Urinal in reach    History of Falls Interventions: Bed/chair exit alarm         Problem: Patient Education: Go to Patient Education Activity  Goal: Patient/Family Education  Outcome: Progressing Towards Goal

## 2022-03-06 NOTE — PROGRESS NOTES
bp stable, tolerating room air o2 well, +cough subsided. Limited 2 d echo pending, to be completed today, d/w cardio and if ok and O2 arranged can be dc later today. Will need hhc.

## 2022-03-06 NOTE — PROGRESS NOTES
Problem: Falls - Risk of  Goal: *Absence of Falls  Description: Document Meneses Blades Fall Risk and appropriate interventions in the flowsheet.   Outcome: Progressing Towards Goal  Note: Fall Risk Interventions:  Mobility Interventions: Bed/chair exit alarm,Patient to call before getting OOB         Medication Interventions: Bed/chair exit alarm,Patient to call before getting OOB    Elimination Interventions: Bed/chair exit alarm,Call light in reach,Urinal in reach    History of Falls Interventions: Bed/chair exit alarm,Room close to nurse's station         Problem: Patient Education: Go to Patient Education Activity  Goal: Patient/Family Education  Outcome: Progressing Towards Goal     Problem: Patient Education: Go to Patient Education Activity  Goal: Patient/Family Education  Outcome: Progressing Towards Goal

## 2022-03-06 NOTE — DISCHARGE SUMMARY
HOSPITALIST DISCHARGE SUMMARY    NAME: Praful Lamb   :  1940   MRN:  [id number]     Date/Time:  3/6/2022 9:40 AM    DISCHARGE DIAGNOSIS:  Principal Problem:    Diastolic CHF, acute on chronic ([address] (3/6/2022)    Active Problems:    Acute respiratory failure with hypoxia ([address] (3/2/2022)      Requires continuous at home supplemental oxygen (3/6/2022)      GERD (gastroesophageal reflux disease) (3/6/2022)      Hypertension (3/6/2022)        Admission Diagnosis:  Acute hypoxic resp failure, acute/chronic diastolic chf, pulmonary hypertension,     CONSULTATIONS: Cardiology, Pulmonary/Intensive care and Hospitalist    Procedures: see electronic medical records for all procedures/Xrays and details which were not copied into this note but were reviewed prior to creation of Plan.    ______________________________________________________________________  DISCHARGE SUMMARY/HOSPITAL COURSE:  for full details see H&P, daily progress notes, labs, consult notes. 62-year-old gentleman, known history of diastolic CHF/chronic, persistent atrial fibrillation, CAD describing ACS approximately 4 months ago treated at [facility], still smoking 1 pack of cigarettes daily, down from 2 packs to 3 months ago. Misael Munch with acute onset of shortness of breath this morning, flash pulmonary edema more likely, pneumonia is a possibility, atypical, Covid is negative and he has been vaccinated and boosted.  Initial EMS SPO2 reportedly in the 50s, improved to 100% on a nonrebreather mask where he remains currently appearing nonlabored in the emergency room.  Diuresis initiated in the ED, does feel some improvement.  Admitting secondary to:  -Acute hypoxic respiratory failure,   -Acute/chronic diastolic CHF + history of at least moderate pulmonary hypertension, component right heart failure,   -Probable COPD, does not appear to be in exacerbation,  -cad,   -Hypertension   -Covid not detected, patient is up-to-date with his Covid vaccinations.  -Pro-Ivan less than 0.5, leukocytosis questionably reactive, feeling at this is less likely bacterial pneumonia.  -Chronic tobacco abuse, currently he says less than 1 pack a day, previously up to 4 months ago was smoking 2 packs of cigarettes daily. Advised to quit smoking especialy now with recommended home O2 supplementation. Admitted to telemetry. Diuresed aggressively with good urine output and improved volume and respiratory status. Followed by cardiology and pulmonology. Pulmonary edema cleared clinically and ultimately weaned to room air. Overnight oximetry revealed significant overnight desaturation and qualified, recommended for home supplemental O2. Recommending Oxygen 2 l/m nc nightly for sleepp and prn. Will follow pcp, pulmonology and cardiology. Smoking cessation encouraged and warned or smoking with O2 in place, combustion potential.  New meds discussed and assess for tolerance and efficacy on follow up. Limited 2 d echo to be performed before dc today. Patient Vitals for the past 4 hrs:   Temp Pulse Resp BP SpO2   03/06/22 0728 98 °F (36.7 °C) 74 17 112/74 94 %       General:  Alert, cooperative, no distress, appears stated age. Head:  Normocephalic, without obvious abnormality, atraumatic. Eyes:  Conjunctivae/corneas clear.  EOMs intact. Throat: Moist oral mucosa   Neck: Supple, symmetrical, trachea midline, no adenopathy, no JVD. Lungs:    Diminished diffusely, no wheeze, not labored, few scattered rhonchi. Chest wall:  No tenderness or deformity. Heart:   Irregular, heart rate 91, S1, S2 normal, no murmur, click, rub or gallop. Abdomen:   Soft, non-tender. , not distended, Bowel sounds present,  No rebound or guarding. Extremities: Extremities normal, atraumatic, no cyanosis. 1-2+ edema, improving   Pulses: 2+ and symmetric all extremities. Skin: Warm,dry   Neurologic: CNII-XII intact. No motor or sensory deficits. _______________________________________________________________________  Medications Reviewed:  DISCHARGE MEDICATIONS:   Current Discharge Medication List      START taking these medications    Details   acetaminophen (TYLENOL) 325 mg tablet Take 2 Tablets by mouth every six (6) hours as needed for Pain or Fever. Qty: 30 Tablet, Refills: 0  Start date: 3/6/2022      aspirin delayed-release 81 mg tablet Take 1 Tablet by mouth daily for 30 days. Qty: 30 Tablet, Refills: 0  Start date: 3/7/2022, End date: 4/6/2022      carvediloL (COREG) 3.125 mg tablet Take 1 Tablet by mouth two (2) times daily (with meals) for 30 days. Indications: high blood pressure  Qty: 60 Tablet, Refills: 0  Start date: 3/6/2022, End date: 4/5/2022      furosemide (LASIX) 40 mg tablet Take 1 Tablet by mouth daily for 30 days. Qty: 30 Tablet, Refills: 0  Start date: 3/6/2022, End date: 4/5/2022      guaiFENesin ER (MUCINEX) 600 mg ER tablet Take 1 Tablet by mouth every twelve (12) hours for 5 days. Qty: 10 Tablet, Refills: 0  Start date: 3/6/2022, End date: 3/11/2022      pravastatin (PRAVACHOL) 20 mg tablet Take 1 Tablet by mouth nightly for 30 days. Indications: excessive fat in the blood  Qty: 30 Tablet, Refills: 0  Start date: 3/6/2022, End date: 4/5/2022         CONTINUE these medications which have NOT CHANGED    Details   tamsulosin (FLOMAX) 0.4 mg capsule Take 2 Capsules by mouth daily. clopidogreL (PLAVIX) 75 mg tab Take 75 mg by mouth daily. lisinopriL (PRINIVIL, ZESTRIL) 20 mg tablet Take 20 mg by mouth daily. omeprazole (PRILOSEC) 20 mg capsule Take 20 mg by mouth daily. multivitamin (ONE A DAY) tablet Take 1 Tablet by mouth daily. traZODone (DESYREL) 50 mg tablet Take 1 Tablet by mouth nightly as needed.          STOP taking these medications       amLODIPine (NORVASC) 10 mg tablet Comments:   Reason for Stopping:         pantoprazole (PROTONIX) 40 mg tablet Comments:   Reason for Stopping: Recommended diet:  Cardiac Diet, no added salt and moderate fluid intake <2 L daily. Recommended activity:Activity as tolerated       Follow Up:  1- Dr. Oralia Cash MD , set up an appointment to see them in 7-10 days. 2-and  Pulmonary/Intensive care Dr Lindsay Merchant 22-3 weeks  3- cardiology Dr Christine Turner 2-4 weeks. _______________________________________________________________________  DISPOSITION:    Home with Family:    Home with HH/PT/OT/RN: x   SNF/LTC:    MANUEL:    OTHER:    ________________________________________________________________________    Total time spent in discharge (min): 40   ________________________________________________________________________    Care Plan discussed with:    Comments   Patient x    Family      RN x    Care Manager x                   Consultant:  x    ________________________________________________________________________  Attending Physician: Michael Godfrey MD  ________________________________________________________________________  **Lab Data Reviewed:  No results found for this or any previous visit (from the past 24 hour(s)).

## 2022-03-06 NOTE — PROGRESS NOTES
Progress Note    Patient: Jennyfer Thomas MRN: [id number]  SSN: [id number]    YOB: 1940  Age: 81 y.o. Sex: male      Admit Date: 3/2/2022    LOS: 4 days     Subjective:     No acute events overnight. denied having any chest pain or shortness of breath. Objective:     Vitals:    03/05/22 1603 03/05/22 1936 03/06/22 0350 03/06/22 0728   BP: (!) 144/73 (!) 155/69 (!) 149/67 112/74   Pulse: 74 61 87 74   Resp: 21 20 20 17   Temp: 98.8 °F (37.1 °C) 98.3 °F (36.8 °C) 98.2 °F (36.8 °C) 98 °F (36.7 °C)   SpO2: 95% 100% 95% 94%   Weight:       Height:            Intake and Output:  Current Shift: No intake/output data recorded. Last three shifts: 03/04 1901 - 03/06 0700  In: 120 [P.O.:120]  Out: 6465 [Urine:4175]    Physical Exam:   General:  Alert, cooperative, no distress, appears stated age. Eyes:  Conjunctivae/corneas clear. PERRL, EOMs intact. Fundi benign   Ears:  Normal TMs and external ear canals both ears. Nose: Nares normal. Septum midline. Mucosa normal. No drainage or sinus tenderness. Mouth/Throat: Lips, mucosa, and tongue normal. Teeth and gums normal.   Neck: Supple, symmetrical, trachea midline, no adenopathy, thyroid: no enlargment/tenderness/nodules, no carotid bruit and no JVD. Back:   Symmetric, no curvature. ROM normal. No CVA tenderness. Lungs:   Clear to auscultation bilaterally. Heart:  Regular rate and rhythm, S1, S2 normal, no murmur, click, rub or gallop. Abdomen:   Soft, non-tender. Bowel sounds normal. No masses,  No organomegaly. Extremities: Extremities normal, atraumatic, no cyanosis or edema. Pulses: 2+ and symmetric all extremities. Skin: Skin color, texture, turgor normal. No rashes or lesions   Lymph nodes: Cervical, supraclavicular, and axillary nodes normal.   Neurologic: CNII-XII intact. Normal strength, sensation and reflexes throughout. Lab/Data Review: All lab results for the last 24 hours reviewed.          Assessment: Principal Problem:    Diastolic CHF, acute on chronic ([facility] (3/6/2022)    Active Problems:    Acute respiratory failure with hypoxia ([facility] (3/2/2022)      Requires continuous at home supplemental oxygen (3/6/2022)      GERD (gastroesophageal reflux disease) (3/6/2022)      Hypertension (3/6/2022)    Patient is an 60-year-old white male with:  1.  Atrial fibrillation with rapid ventricular rate  2.  Alcohol abuse  3.  Nicotine dependence  4.  Coronary artery status post PCI  5.  Hyperlipidemia  6.  Frequent falls  7.  Heart failure with preserved ejection fraction  8.  Anemia       Plan:     No labs done today. He appears to be euvolemic. Laying flat and appears to be comfortable. Echocardiogram to follow-up on mitral regurgitation is pending. If that is unremarkable then he can be discharged later today to follow-up in my office in 2 weeks or sooner if needed.     Signed By: Alfie Altamirano MD     March 6, 2022

## 2022-03-07 PROCEDURE — 74011250637 HC RX REV CODE- 250/637: Performed by: INTERNAL MEDICINE

## 2022-03-07 PROCEDURE — 97110 THERAPEUTIC EXERCISES: CPT

## 2022-03-07 PROCEDURE — 97116 GAIT TRAINING THERAPY: CPT

## 2022-03-07 PROCEDURE — 97530 THERAPEUTIC ACTIVITIES: CPT

## 2022-03-07 PROCEDURE — 65270000029 HC RM PRIVATE

## 2022-03-07 RX ADMIN — GUAIFENESIN 600 MG: 600 TABLET, EXTENDED RELEASE ORAL at 08:22

## 2022-03-07 RX ADMIN — LISINOPRIL 5 MG: 5 TABLET ORAL at 08:22

## 2022-03-07 RX ADMIN — GUAIFENESIN 600 MG: 600 TABLET, EXTENDED RELEASE ORAL at 21:24

## 2022-03-07 RX ADMIN — PRAVASTATIN SODIUM 20 MG: 20 TABLET ORAL at 21:24

## 2022-03-07 RX ADMIN — CARVEDILOL 3.12 MG: 3.12 TABLET, FILM COATED ORAL at 08:22

## 2022-03-07 RX ADMIN — FUROSEMIDE 40 MG: 40 TABLET ORAL at 05:08

## 2022-03-07 RX ADMIN — CARVEDILOL 3.12 MG: 3.12 TABLET, FILM COATED ORAL at 17:40

## 2022-03-07 RX ADMIN — FUROSEMIDE 40 MG: 40 TABLET ORAL at 08:24

## 2022-03-07 RX ADMIN — FUROSEMIDE 40 MG: 40 TABLET ORAL at 17:41

## 2022-03-07 RX ADMIN — ASPIRIN 81 MG: 81 TABLET, COATED ORAL at 08:22

## 2022-03-07 RX ADMIN — PANTOPRAZOLE SODIUM 40 MG: 40 TABLET, DELAYED RELEASE ORAL at 08:22

## 2022-03-07 NOTE — PROGRESS NOTES
Progress Note    Patient: Ele Pereira MRN: [id number]  SSN: [id number]    YOB: 1940  Age: 81 y.o. Sex: male      Admit Date: 3/2/2022    LOS: 5 days     Subjective:     No acute events overnight. denied having any chest pain or shortness of breath. Objective:     Vitals:    03/06/22 2030 03/06/22 2339 03/07/22 0717 03/07/22 1138   BP: (!) 157/73 139/73 (!) 147/72 136/70   Pulse: 76 69 71 (!) 55   Resp: 15 14 20 18   Temp: 97.7 °F (36.5 °C) 97.8 °F (36.6 °C) 98.4 °F (36.9 °C) 98.9 °F (37.2 °C)   SpO2: 95% 94% 94% 92%   Weight:       Height:            Intake and Output:  Current Shift: No intake/output data recorded. Last three shifts: 03/05 1901 - 03/07 0700  In: 120 [P.O.:120]  Out: 3755 [Urine:3755]    Physical Exam:   General:  Alert, cooperative, no distress, appears stated age. Eyes:  Conjunctivae/corneas clear. PERRL, EOMs intact. Fundi benign   Ears:  Normal TMs and external ear canals both ears. Nose: Nares normal. Septum midline. Mucosa normal. No drainage or sinus tenderness. Mouth/Throat: Lips, mucosa, and tongue normal. Teeth and gums normal.   Neck: Supple, symmetrical, trachea midline, no adenopathy, thyroid: no enlargment/tenderness/nodules, no carotid bruit and no JVD. Back:   Symmetric, no curvature. ROM normal. No CVA tenderness. Lungs:   Clear to auscultation bilaterally. Heart:  Regular rate and rhythm, S1, S2 normal, no murmur, click, rub or gallop. Abdomen:   Soft, non-tender. Bowel sounds normal. No masses,  No organomegaly. Extremities: Extremities normal, atraumatic, no cyanosis or edema. Pulses: 2+ and symmetric all extremities. Skin: Skin color, texture, turgor normal. No rashes or lesions   Lymph nodes: Cervical, supraclavicular, and axillary nodes normal.   Neurologic: CNII-XII intact. Normal strength, sensation and reflexes throughout. Lab/Data Review: All lab results for the last 24 hours reviewed.          Assessment: Principal Problem:    Diastolic CHF, acute on chronic ([facility] [address] (3/6/2022)    Active Problems:    Acute respiratory failure with hypoxia ([facility] [address] (3/2/2022)      Requires continuous at home supplemental oxygen (3/6/2022)      GERD (gastroesophageal reflux disease) (3/6/2022)      Hypertension (3/6/2022)    Patient is an 71-year-old white male with:  1.  Atrial fibrillation with rapid ventricular rate  2.  Alcohol abuse  3.  Nicotine dependence  4.  Coronary artery status post PCI  5.  Hyperlipidemia  6.  Frequent falls  7.  Heart failure with preserved ejection fraction  8.  Anemia       Plan:     White count 11.6 and most recent hemoglobin 8.7. Echocardiogram showed EF of 45 to 50% with mild LVH, dilated right ventricle, mild AI. Moderate MR. I will see patient in 2 weeks or sooner if needed. Labs with primary care physician. Currently on aspirin, carvedilol, Lasix, lisinopril and pravastatin.     Signed By: Bertha Oliveros MD     March 7, 2022

## 2022-03-07 NOTE — PROGRESS NOTES
PHYSICAL THERAPY TREATMENT  Patient: Bassam Hollis (34 y.o. male)  Date: 3/7/2022  Diagnosis: Acute respiratory failure with hypoxia (HCC) [W85.88] Diastolic CHF, acute on chronic [facility])       Precautions:    Chart, physical therapy assessment, plan of care and goals were reviewed. ASSESSMENT  Patient continues with skilled PT services and is progressing towards goals. Patient seated in chair upon approach and agreed to therapy today. Patient performed STS impulsively at mod I stating that he was ok to stand without falling but was unsteady during STS and static standing. Patient then ambulated for 100 feet with RW and CGA. Patient had no LOB or knee buckling during gait but needed VC to keep walker close to body. Patient had antalgic gait reporting pain in LLE during gait but not giving a pain rating. Patient returned to seated in chair where O2 was taken at 93% and christian up to 97% within 1-2 minutes with pursed lip breathing. Patient then performed seated TE ( see details below). Patient was slightly impulsive and very talkative throughout session, needing min VC to keep focused on tasks. Patient then transferred from chair via SPT without any assistive device at mod I. Patient left supine in bed with call bell within reach and all needs meet. Current Level of Function Impacting Discharge (mobility/balance): general weakness. Slight impulsiveness, poor activity tolerance    Other factors to consider for discharge: PLOF, assistance at home . Level of deficits. PLAN :  Patient continues to benefit from skilled intervention to address the above impairments. Continue treatment per established plan of care. to address goals.     Recommendation for discharge: (in order for the patient to meet his/her long term goals)  [address]     This discharge recommendation:  Has been made in collaboration with the attending provider and/or case management    IF patient discharges home will need the following DME: rolling walker       SUBJECTIVE:   Patient stated Marion Rice have walked here before.     OBJECTIVE DATA SUMMARY:   Critical Behavior:  Neurologic State: Alert  Orientation Level: Appropriate for age,Oriented X4  Cognition: Follows commands     Functional Mobility Training:  Bed Mobility:  Sit to Supine: Modified independent  Transfers:  Sit to Stand: Stand-by assistance  Stand to Sit: Stand-by assistance  Balance:  Sitting: Intact; Without support  Standing: Intact; With support  Standing - Static: Constant support;Good  Standing - Dynamic : Constant support; Fair  Ambulation/Gait Training:  Distance (ft): 100 Feet (ft)  Assistive Device: Gait belt;Walker, rolling  Ambulation - Level of Assistance: Contact guard assistance  Therapeutic Exercises:   1x15 AP  1x15 LAQ  1x15 seated marches  1x15 hip ADD/ABD  Pain Rating:  Pain in LLE during gait during rating was not given. Activity Tolerance:   Fair and requires rest breaks  Please refer to the flowsheet for vital signs taken during this treatment. After treatment patient left in no apparent distress:   Supine in bed, Call bell within reach, and Side rails x 3    COMMUNICATION/COLLABORATION:   The patients plan of care was discussed with: Registered nurse. Problem: Mobility Impaired (Adult and Pediatric)  Goal: *Acute Goals and Plan of Care (Insert Text)  Description: Patient will move from supine to sit and sit to supine , scoot up and down, and roll side to side in bed with supervision/set-up within 7 day(s). Patient will transfer from bed to chair and chair to bed with independence using the least restrictive device within 7 day(s). Patient will improve static standing balance to independence within 1 week(s). Patient will ambulate 50 feet with independence with least restrictive device within 1 weeks.        Outcome: Progressing Towards Goal       Eileen Peres PTA   Time Calculation: 23 mins

## 2022-03-07 NOTE — PROGRESS NOTES
CM met with patient to discuss DCP, patient aware he will need home nocturnal O2, gave choice for [facility].    Medicare pt has received, reviewed, and signed 2nd IM letter informing them of their right to appeal the discharge. Signed copied has been placed on pt bedside chart. Patient states that his caregiver, Cammie Gutierrez, will transport on d/c.    SANDEEP spoke with Ms. Cammie Gutierrez [phone], she is agreeable to [facility], asked that referral be sent to [facility], agreeable to O2 set up, states she will  patient this date approx 3 PM.    CM awaiting approval for O2 and HH, nurse notified of DCP.       1:40 PM - SANDEEP spoke with Lonny Sargent at [facility], she reported that patient's overnight O2 test had  and another would need to be completed. Patient's attending and nurse notified. CM contacted Ms. Cammie Gutierrez to notify. Plan for d/c tomorrow.

## 2022-03-07 NOTE — DISCHARGE SUMMARY
HOSPITALIST DISCHARGE SUMMARY    NAME: Terrie Zavaleta   :  1940   MRN:  [id number]     Date/Time:  3/7/2022 9:40 AM    DISCHARGE DIAGNOSIS:  Principal Problem:    Diastolic CHF, acute on chronic ([address] (3/6/2022)    Active Problems:    Acute respiratory failure with hypoxia ([address] (3/2/2022)      Requires continuous at home supplemental oxygen (3/6/2022)      GERD (gastroesophageal reflux disease) (3/6/2022)      Hypertension (3/6/2022)        Admission Diagnosis:  Acute hypoxic resp failure, acute/chronic diastolic chf, pulmonary hypertension,     CONSULTATIONS: Cardiology, Pulmonary/Intensive care and Hospitalist    Procedures: see electronic medical records for all procedures/Xrays and details which were not copied into this note but were reviewed prior to creation of Plan.    ______________________________________________________________________  DISCHARGE SUMMARY/HOSPITAL COURSE:  for full details see H&P, daily progress notes, labs, consult notes. 81-year-old gentleman, known history of diastolic CHF/chronic, persistent atrial fibrillation, CAD describing ACS approximately 4 months ago treated at [facility], still smoking 1 pack of cigarettes daily, down from 2 packs to 3 months ago. Donna Joao with acute onset of shortness of breath this morning, flash pulmonary edema more likely, pneumonia is a possibility, atypical, Covid is negative and he has been vaccinated and boosted.  Initial EMS SPO2 reportedly in the 50s, improved to 100% on a nonrebreather mask where he remains currently appearing nonlabored in the emergency room.  Diuresis initiated in the ED, does feel some improvement.  Admitting secondary to:  -Acute hypoxic respiratory failure,   -Acute/chronic diastolic CHF + history of at least moderate pulmonary hypertension, component right heart failure,   -Probable COPD, does not appear to be in exacerbation,  -cad,   -Hypertension   -Covid not detected, patient is up-to-date with his Covid vaccinations.  -Pro-Ivan less than 0.5, leukocytosis questionably reactive, feeling at this is less likely bacterial pneumonia.  -Chronic tobacco abuse, currently he says less than 1 pack a day, previously up to 4 months ago was smoking 2 packs of cigarettes daily. Advised to quit smoking especialy now with recommended home O2 supplementation. Admitted to telemetry. Diuresed aggressively with good urine output and improved volume and respiratory status. Followed by cardiology and pulmonology. Pulmonary edema cleared clinically and ultimately weaned to room air. Overnight oximetry revealed significant overnight desaturation and qualified, recommended for home supplemental O2. Recommending Oxygen 2 l/m nc nightly for sleepp and prn. Will follow pcp, pulmonology and cardiology. Smoking cessation encouraged and warned or smoking with O2 in place, combustion potential states he understands, declines nicotine replacement rx. New meds discussed and assess for tolerance and efficacy on follow up. Echo 3/6/22:      Left Ventricle: Mild global hypokinesis present. Left ventricle size is normal. Mildly increased wall thickness. Normal left ventricular systolic function with a visually estimated EF of 45 - 50%.   Right Ventricle: Right ventricle is mildly dilated. Normal wall thickness.   Aortic Valve: Mild transvalvular regurgitation.   Mitral Valve: Mildly thickened leaflet. Moderate transvalvular regurgitation.   Tricuspid Valve: Moderate transvalvular regurgitation.   Left Atrium: Left atrium is moderately dilated. Left atrial volume index is mildly increased (35-41 mL/m2).         Patient Vitals for the past 4 hrs:   Temp Pulse Resp BP SpO2   03/07/22 1138 98.9 °F (37.2 °C) (!) 55 18 136/70 92 %       General:  Alert, cooperative, no distress, appears stated age. Head:  Normocephalic, without obvious abnormality, atraumatic. Eyes:  Conjunctivae/corneas clear.   EOMs intact. Throat: Moist oral mucosa   Neck: Supple, symmetrical, trachea midline, no adenopathy, no JVD. Lungs:    Diminished diffusely, no wheeze, not labored, few scattered rhonchi. Chest wall:  No tenderness or deformity. Heart:   Irregular, heart rate 91, S1, S2 normal, no murmur, click, rub or gallop. Abdomen:   Soft, non-tender. , not distended, Bowel sounds present,  No rebound or guarding. Extremities: Extremities normal, atraumatic, no cyanosis. 1-2+ edema, improving   Pulses: 2+ and symmetric all extremities. Skin: Warm,dry   Neurologic: CNII-XII intact. No motor or sensory deficits.          _______________________________________________________________________  Medications Reviewed:  DISCHARGE MEDICATIONS:   Current Discharge Medication List      START taking these medications    Details   acetaminophen (TYLENOL) 325 mg tablet Take 2 Tablets by mouth every six (6) hours as needed for Pain or Fever. Qty: 30 Tablet, Refills: 0  Start date: 3/6/2022      aspirin delayed-release 81 mg tablet Take 1 Tablet by mouth daily for 30 days. Qty: 30 Tablet, Refills: 0  Start date: 3/7/2022, End date: 4/6/2022      carvediloL (COREG) 3.125 mg tablet Take 1 Tablet by mouth two (2) times daily (with meals) for 30 days. Indications: high blood pressure  Qty: 60 Tablet, Refills: 0  Start date: 3/6/2022, End date: 4/5/2022      furosemide (LASIX) 40 mg tablet Take 1 Tablet by mouth daily for 30 days. Qty: 30 Tablet, Refills: 0  Start date: 3/6/2022, End date: 4/5/2022      guaiFENesin ER (MUCINEX) 600 mg ER tablet Take 1 Tablet by mouth every twelve (12) hours for 5 days. Qty: 10 Tablet, Refills: 0  Start date: 3/6/2022, End date: 3/11/2022      pravastatin (PRAVACHOL) 20 mg tablet Take 1 Tablet by mouth nightly for 30 days.  Indications: excessive fat in the blood  Qty: 30 Tablet, Refills: 0  Start date: 3/6/2022, End date: 4/5/2022         CONTINUE these medications which have NOT CHANGED    Details tamsulosin (FLOMAX) 0.4 mg capsule Take 2 Capsules by mouth daily. clopidogreL (PLAVIX) 75 mg tab Take 75 mg by mouth daily. lisinopriL (PRINIVIL, ZESTRIL) 20 mg tablet Take 20 mg by mouth daily. omeprazole (PRILOSEC) 20 mg capsule Take 20 mg by mouth daily. multivitamin (ONE A DAY) tablet Take 1 Tablet by mouth daily. traZODone (DESYREL) 50 mg tablet Take 1 Tablet by mouth nightly as needed. STOP taking these medications       amLODIPine (NORVASC) 10 mg tablet Comments:   Reason for Stopping:         pantoprazole (PROTONIX) 40 mg tablet Comments:   Reason for Stopping:                 Recommended diet:  Cardiac Diet, no added salt and moderate fluid intake <2 L daily. Recommended activity:Activity as tolerated       Follow Up:  1- Dr. Margarite Gitelman, MD , set up an appointment to see them in 7-10 days. 2-and  Pulmonary/Intensive care Dr Gi John 22-3 weeks  3- cardiology Dr Papo Guidry 2-4 weeks.     _______________________________________________________________________  DISPOSITION:    Home with Family:    Home with HH/PT/OT/RN: x   SNF/LTC:    MANUEL:    OTHER:    ________________________________________________________________________    Total time spent in discharge (min): 40   ________________________________________________________________________    Care Plan discussed with:    Comments   Patient x    Family      RN x    Care Manager x                   Consultant:  x    ________________________________________________________________________  Attending Physician: Dorothea Gaspar, MD  ________________________________________________________________________  **Lab Data Reviewed:  Recent Results (from the past 24 hour(s))   ECHO ADULT COMPLETE    Collection Time: 03/06/22  1:55 PM   Result Value Ref Range    LV EDV A2C 60 mL    LV EDV A4C 76 mL    LV ESV A2C 35 mL    LV ESV A4C 42 mL    IVSd 1.4 (A) 0.6 - 1.0 cm    LVIDd 4.0 (A) 4.2 - 5.9 cm    LVIDs 2.9 cm    LVOT Mean Gradient 8 mmHg    LVOT VTI 36.6 cm    LVOT Peak Velocity 2.2 m/s    LVOT Peak Gradient 20 mmHg    LVPWd 1.2 (A) 0.6 - 1.0 cm    LV E' Lateral Velocity 14 cm/s    LV E' Septal Velocity 6 cm/s    LV Ejection Fraction A2C 42 %    LV Ejection Fraction A4C 45 %    EF BP 43 (A) 55 - 100 %    LA Minor Axis 6.2 cm    LA Major Axis 5.6 cm    LA Area 2C 23.5 cm2    LA Area 4C 21.3 cm2    LA Volume BP 72 (A) 18 - 58 mL    LA Diameter 4.8 cm    AR .0 ms    AR Max Velocity PISA 3.8 m/s    AV Peak Velocity 1.8 m/s    AV Peak Gradient 13 mmHg    AV Mean Gradient 6 mmHg    AV VTI 30.6 cm    AV Mean Velocity 1.1 m/s    Aortic Root 3.7 cm    Ascending Aorta 3.3 cm    MR .0 cm    MR Peak Velocity 5.0 m/s    MR Peak Gradient 100 mmHg    MV A Velocity 0.67 m/s    MV E Velocity 0.95 m/s    NJ Max Velocity 1.4 m/s    Pulmonary Artery EDP 8 mmHg    PV Mean Gradient 2 mmHg    PV VTI 16.9 cm    PV Mean Velocity 0.7 m/s    PV Max Velocity 1.0 m/s    PV Peak Gradient 4 mmHg    TR Max Velocity 3.34 m/s    TR Peak Gradient 45 mmHg    Fractional Shortening 2D 28 28 - 44 %    LV ESV Index A4C 22 mL/m2    LV EDV Index A4C 39 mL/m2    LV ESV Index A2C 18 mL/m2    LV EDV Index A2C 31 mL/m2    LVIDd Index 2.07 cm/m2    LVIDs Index 1.50 cm/m2    LV RWT Ratio 0.60     LV Mass 2D 186.5 88 - 224 g    LV Mass 2D Index 96.7 49 - 115 g/m2    MV E/A 1.42     E/E' Ratio (Averaged) 11.31     E/E' Lateral 6.79     E/E' Septal 15.83     LA Volume Index BP 37 (A) 16 - 34 ml/m2    LA Size Index 2.49 cm/m2    LA/AO Root Ratio 1.30     Ao Root Index 1.92 cm/m2    Ascending Aorta Index 1.71 cm/m2    AV Velocity Ratio 1.22     LVOT:AV VTI Index 1.20     RV Basal Dimension 4.5 cm    RV Mid Dimension 3.9 cm    Est. RA Pressure 3 mmHg    RVSP 48 mmHg

## 2022-03-07 NOTE — PROGRESS NOTES
OCCUPATIONAL THERAPY TREATMENT  Patient: Davy Coleman (17 y.o. male)  Date: 3/7/2022  Diagnosis: Acute respiratory failure with hypoxia ([facility] [address] [D82.15] Diastolic CHF, acute on chronic [facility])       Precautions:    Chart, occupational therapy assessment, plan of care, and goals were reviewed. ASSESSMENT  Patient continues with skilled OT services and is progressing towards goals. Pt. Received semi-supine in bed and agreeable to tx session. Pt. Performed bed mobility IND. Pt. Slightly impulsive and began standing at EOB once seated at EOB- pt required v/c's to stay seated . Pt. Required CGA for dressing hospital gown. Pt. Performed sit-> stand with SBA and use of RW upon standing for support. Pt. Performed functional ambulation from bed to bathroom use of RW and CGA for safety. Pt. Performed toilet transfer with CGA. Pt. Able to stand up-right at sink level and complete hand hygiene and facial grooming. Fatigue quickly and required seated RB in chair. Pt. Complete LB dressing while seated in chair with supervision. Pt. Performed UE therex while seated in chair to increase overall strength and endurance. Pt. Talkative during treatment session and required re-direction to task throughout tx session. Pt. Left seated in chair with needs met and call bell within reach with PTA in pts room to perform in tx session. . REC. longterm with HHOT when medically appropriate for discharge. Current Level of Function Impacting Discharge (ADLs): impulsive, assistance with OOB transfers and mobility, set-up assistance ADL's    Other factors to consider for discharge: PLOF, time since on set         PLAN :  Patient continues to benefit from skilled intervention to address the above impairments. Continue treatment per established plan of care. to address goals.     Recommendation for discharge: (in order for the patient to meet his/her long term goals)  CHRISTOPHER with HHOT    This discharge recommendation:  Has been made in collaboration with the attending provider and/or case management    IF patient discharges home will need the following DME: patient owns DME required for discharge       SUBJECTIVE:   Patient stated i'm doing okay.     OBJECTIVE DATA SUMMARY:   Cognitive/Behavioral Status:  Neurologic State: Alert  Orientation Level: Oriented X4  Cognition: Follows commands    Functional Mobility and Transfers for ADLs:  Bed Mobility:  Rolling: Independent  Supine to Sit: Independent  Sit to Supine: Modified independent  Scooting: Independent    Transfers:  Sit to Stand: Stand-by assistance  Functional Transfers  Bathroom Mobility: Stand-by assistance  Toilet Transfer : Stand-by assistance       Balance:  Sitting: High guard; Without support  Standing: Intact; With support  Standing - Static: Constant support;Good  Standing - Dynamic : Constant support; Fair    ADL Intervention:       Grooming  Grooming Assistance: Set-up; Contact guard assistance  Position Performed: Standing  Washing Face: Set-up; Contact guard assistance  Washing Hands: Set-up; Contact guard assistance    Lower Body Dressing Assistance  Socks: Supervision  Position Performed: Seated in chair    Toileting  Bladder Hygiene: Independent  Clothing Management: Contact guard assistance    Therapeutic Exercises: ny UE's  Exercise Sets Reps AROM AAROM PROM Self PROM Comments   Chest presses  1 10 [x] [] [] []    Elbow flex/ext 1 10 [x] [] [] []    Wrist flex/ext 1 10 [x] [] [] []       [] [] [] []          Pain:  0/10     Activity Tolerance:   Fair and requires rest breaks  Please refer to the flowsheet for vital signs taken during this treatment. After treatment patient left in no apparent distress:   Sitting in chair and Call bell within reach    COMMUNICATION/COLLABORATION:   The patients plan of care was discussed with: Physical therapy assistant and Registered nurse.  Slight overlap with ABHISHEK Santoro  Time Calculation: 35 mins    Problem: Self Care Deficits Care Plan (Adult)  Goal: *Acute Goals and Plan of Care (Insert Text)  Description: Pt will be MOD I grooming standing sinkside LRAD  Pt will be IND LE dressing sitting EOB/long sit  Pt will be MOD I sit <>  prep for toileting LRAD  Pt will be MOD I toileting/toilet transfer/cloth mgmt LRAD  Pt will be IND following UE HEP in prep for self care tasks   Outcome: Progressing Towards Goal

## 2022-03-08 VITALS
DIASTOLIC BLOOD PRESSURE: 63 MMHG | HEIGHT: 71 IN | RESPIRATION RATE: 22 BRPM | HEART RATE: 74 BPM | BODY MASS INDEX: 19.48 KG/M2 | TEMPERATURE: 98 F | SYSTOLIC BLOOD PRESSURE: 108 MMHG | OXYGEN SATURATION: 95 % | WEIGHT: 139.11 LBS

## 2022-03-08 PROCEDURE — 74011250637 HC RX REV CODE- 250/637: Performed by: INTERNAL MEDICINE

## 2022-03-08 RX ADMIN — CARVEDILOL 3.12 MG: 3.12 TABLET, FILM COATED ORAL at 08:21

## 2022-03-08 RX ADMIN — LISINOPRIL 5 MG: 5 TABLET ORAL at 08:21

## 2022-03-08 RX ADMIN — ASPIRIN 81 MG: 81 TABLET, COATED ORAL at 08:21

## 2022-03-08 RX ADMIN — GUAIFENESIN 600 MG: 600 TABLET, EXTENDED RELEASE ORAL at 08:21

## 2022-03-08 RX ADMIN — PANTOPRAZOLE SODIUM 40 MG: 40 TABLET, DELAYED RELEASE ORAL at 08:21

## 2022-03-08 RX ADMIN — FUROSEMIDE 40 MG: 40 TABLET ORAL at 08:24

## 2022-03-08 NOTE — PROGRESS NOTES
Patient discharge delayed 3/7 due to need for overnight pulse oximetry. Updated results of overnight pulse oximetry from night shift in patient chart now.

## 2022-03-08 NOTE — PROGRESS NOTES
Progress Note    Patient: Wilbert Soto MRN: [id number]  SSN: [id number]    YOB: 1940  Age: 81 y.o. Sex: male      Admit Date: 3/2/2022    LOS: 6 days     Subjective:     No acute events overnight. Objective:     Vitals:    03/08/22 0735 03/08/22 0846 03/08/22 1051 03/08/22 1211   BP: (!) 145/75  110/61    Pulse: 71  81    Resp: 16  22    Temp: 97.3 °F (36.3 °C)  98 °F (36.7 °C)    SpO2: 97% 96% 97%    Weight:       Height:    5' 11\" (1.803 m)        Intake and Output:  Current Shift: No intake/output data recorded. Last three shifts: 03/06 1901 - 03/08 0700  In: -   Out: 2606 [Urine:1425]    Physical Exam:   General:  Alert, cooperative, no distress, appears stated age. Eyes:  Conjunctivae/corneas clear. PERRL, EOMs intact. Fundi benign   Ears:  Normal TMs and external ear canals both ears. Nose: Nares normal. Septum midline. Mucosa normal. No drainage or sinus tenderness. Mouth/Throat: Lips, mucosa, and tongue normal. Teeth and gums normal.   Neck: Supple, symmetrical, trachea midline, no adenopathy, thyroid: no enlargment/tenderness/nodules, no carotid bruit and no JVD. Back:   Symmetric, no curvature. ROM normal. No CVA tenderness. Lungs:   Clear to auscultation bilaterally. Heart:  Regular rate and rhythm, S1, S2 normal, no murmur, click, rub or gallop. Abdomen:   Soft, non-tender. Bowel sounds normal. No masses,  No organomegaly. Extremities: Extremities normal, atraumatic, no cyanosis or edema. Pulses: 2+ and symmetric all extremities. Skin: Skin color, texture, turgor normal. No rashes or lesions   Lymph nodes: Cervical, supraclavicular, and axillary nodes normal.   Neurologic: CNII-XII intact. Normal strength, sensation and reflexes throughout. Lab/Data Review: All lab results for the last 24 hours reviewed.          Assessment:     Principal Problem:    Diastolic CHF, acute on chronic ([address] (3/6/2022)    Active Problems:    Acute respiratory failure with hypoxia ([address] (3/2/2022)      Requires continuous at home supplemental oxygen (3/6/2022)      GERD (gastroesophageal reflux disease) (3/6/2022)      Hypertension (3/6/2022)    Patient is an 72-year-old white male with:  1.  Atrial fibrillation with rapid ventricular rate  2.  Alcohol abuse  3.  Nicotine dependence  4.  Coronary artery status post PCI  5.  Hyperlipidemia  6.  Frequent falls  7.  Heart failure with preserved ejection fraction  8.  Anemia       Plan:     Patient is back to baseline. Apparently has not been very compliant as an outpatient with fluid restriction. Kidney function was stable. He appears to be euvolemic. I will see him on 30 March or sooner if needed.       Signed By: Sonny Matias MD     March 8, 2022

## 2022-03-08 NOTE — PROGRESS NOTES
Spoke with patient caregiver Toniabalbir Poole, stated that patient would be discharged today, she says that a  for the patient will arrive sometime around 3pm.

## 2022-03-08 NOTE — PROGRESS NOTES
Comprehensive Nutrition Assessment    Type and Reason for Visit: ERIC nutrition re-screen/LOS (LOS x6)    Nutrition Recommendations/Plan:   Continue diet as ordered. Re-educate/reinforce nutrition knowledge as desired. Monitor while Inpatient. Nutrition Assessment:   Admitted for acute on CHF, GERD, HTN, ARF w/ hypoxia on 02 at home. Appetite reported as good, w/ varied intake based on specific food preferences; ~50% today. UBW as 175-180#?; reported weight loss likely r/t diuresis and edema resolved(h/o+1-2 edema)- continues on lasix. Chart review w/ Diet education by RD x4 mths for CHF management- Pt denied following rec's and heart healthy diet- refused re-education pending D/C today. Encouraged water intake vs. coffee. Labs: H/H 8.7/31.3, BUN 35, Alb 3.4. Meds: PPI, coreg, lasix, lisinopril, pravastatin, aspirin. Malnutrition Assessment:  Malnutrition Status: Moderate malnutrition    Context:  Acute illness     Findings of the 6 clinical characteristics of malnutrition:   Energy Intake:  No significant decrease in energy intake  Weight Loss:  7.0 - Greater than 5% over 1 month     Body Fat Loss:  Unable to assess,     Muscle Mass Loss:  Unable to assess,    Fluid Accumulation:  No significant fluid accumulation,     Strength:  Not performed     Nutrition Related Findings:   Observed as fairly nourished. No N/V/D/C nor c/s issues reported. Stated difficulty w/ chewing meats- unable to verify if chewing issue at this time. Last BM on 3/5. No edema.     Wounds:    None       Current Nutrition Therapies:  ADULT DIET Regular; Low Fat/Low Chol/High Fiber/2 gm Na    Anthropometric Measures:  · Height:  5' 11\" (180.3 cm)  · Current Body Wt:  63.1 kg (139 lb 1.8 oz) (3/8)   · Admission Body Wt:       · Usual Body Wt:  79.4 kg (175 lb)     · Ideal Body Wt:  172 lbs:  80.9 %   · Adjusted Body Weight:   ; Weight Adjustment for: No adjustment   · Adjusted BMI:       · BMI Category:  Underweight (BMI less than 22) age over 72       Nutrition Diagnosis:   · Moderate malnutrition,In context of acute illness or injury related to cardiac dysfunction as evidenced by intake 51-75%,BMI,weight loss greater than or equal to 5% in 1 month,localized or generalized fluid accumulation,poor dentition    Nutrition Interventions:   Food and/or Nutrient Delivery: Continue current diet  Nutrition Education and Counseling: No recommendations at this time  Coordination of Nutrition Care: Continue to monitor while inpatient    Nutrition Monitoring and Evaluation:   Behavioral-Environmental Outcomes: Beliefs and attitudes,Readiness for change  Food/Nutrient Intake Outcomes: Diet advancement/tolerance  Physical Signs/Symptoms Outcomes: Biochemical data,Chewing or swallowing,Meal time behavior,Weight,Fluid status or edema    Discharge Planning:    Continue current diet     Electronically signed by Thu Vences RD on 3/8/2022 at 12:02 PM    Contact: ext. [phone] or Lolly.

## 2022-03-08 NOTE — PROGRESS NOTES
Patient escorted downstairs via wheelchair with all patient belongings and taken back to [address] [place] [address] living [facility] via car.

## 2022-03-08 NOTE — PROGRESS NOTES
New overnight O2 study reviewed by CM and [facility],THE, according to insurace guidelines, patient no longer qualifies for home nocturnal O2. Patient is clear to d/c to home with [facility], accepted with [facility]. Patient is clear to d/c from  to home with [facility], nurse notified.

## 2022-03-08 NOTE — DISCHARGE SUMMARY
HOSPITALIST DISCHARGE SUMMARY    NAME: Vinicius Villalpando   :  1940   MRN:  [id number]     Date/Time:  3/8/2022 9:40 AM    DISCHARGE DIAGNOSIS:  Principal Problem:    Diastolic CHF, acute on chronic ([facility] (3/6/2022)    Active Problems:    Acute respiratory failure with hypoxia ([facility] [address] (3/2/2022)      Requires continuous at home supplemental oxygen (3/6/2022)      GERD (gastroesophageal reflux disease) (3/6/2022)      Hypertension (3/6/2022)        Admission Diagnosis:  Acute hypoxic resp failure, acute/chronic diastolic chf, pulmonary hypertension,     CONSULTATIONS: Cardiology, Pulmonary/Intensive care and Hospitalist    Procedures: see electronic medical records for all procedures/Xrays and details which were not copied into this note but were reviewed prior to creation of Plan.    ______________________________________________________________________  DISCHARGE SUMMARY/HOSPITAL COURSE:  for full details see H&P, daily progress notes, labs, consult notes. 36-year-old gentleman, known history of diastolic CHF/chronic, persistent atrial fibrillation, CAD describing ACS approximately 4 months ago treated at [facility], still smoking 1 pack of cigarettes daily, down from 2 packs to 3 months ago. Trygve Priestly with acute onset of shortness of breath this morning, flash pulmonary edema more likely, pneumonia is a possibility, atypical, Covid is negative and he has been vaccinated and boosted.  Initial EMS SPO2 reportedly in the 50s, improved to 100% on a nonrebreather mask where he remains currently appearing nonlabored in the emergency room.  Diuresis initiated in the ED, does feel some improvement.  Admitting secondary to:  -Acute hypoxic respiratory failure,   -Acute/chronic diastolic CHF + history of at least moderate pulmonary hypertension, component right heart failure,   -Probable COPD, does not appear to be in exacerbation,  -cad,   -Hypertension   -Covid not detected, patient is up-to-date with his Covid vaccinations.  -Pro-Ivan less than 0.5, leukocytosis questionably reactive, feeling at this is less likely bacterial pneumonia.  -Chronic tobacco abuse, currently he says less than 1 pack a day, previously up to 4 months ago was smoking 2 packs of cigarettes daily. Advised to quit smoking especialy now with recommended home O2 supplementation. Admitted to telemetry. Diuresed aggressively with good urine output and improved volume and respiratory status. Followed by cardiology and pulmonology. Pulmonary edema cleared clinically and ultimately weaned to room air. Overnight pulse oximetry was done- patient no longer qualifies for home nocturnal oxygen. Will follow pcp, pulmonology and cardiology. Smoking cessation encouraged and warned or smoking with O2 in place, combustion potential states he understands, declines nicotine replacement rx. New meds discussed and assess for tolerance and efficacy on follow up. Echo 3/6/22:      Left Ventricle: Mild global hypokinesis present. Left ventricle size is normal. Mildly increased wall thickness. Normal left ventricular systolic function with a visually estimated EF of 45 - 50%.   Right Ventricle: Right ventricle is mildly dilated. Normal wall thickness.   Aortic Valve: Mild transvalvular regurgitation.   Mitral Valve: Mildly thickened leaflet. Moderate transvalvular regurgitation.   Tricuspid Valve: Moderate transvalvular regurgitation.   Left Atrium: Left atrium is moderately dilated. Left atrial volume index is mildly increased (35-41 mL/m2).         No data found. General:  Alert, cooperative, no distress, appears stated age. Head:  Normocephalic, without obvious abnormality, atraumatic. Eyes:  Conjunctivae/corneas clear.  EOMs intact. Throat: Moist oral mucosa   Neck: Supple, symmetrical, trachea midline, no adenopathy, no JVD. Lungs:    Diminished diffusely, no wheeze, not labored, few scattered rhonchi. Chest wall:  No tenderness or deformity. Heart:   Irregular, heart rate 91, S1, S2 normal, no murmur, click, rub or gallop. Abdomen:   Soft, non-tender. , not distended, Bowel sounds present,  No rebound or guarding. Extremities: Extremities normal, atraumatic, no cyanosis. 1-2+ edema, improving   Pulses: 2+ and symmetric all extremities. Skin: Warm,dry   Neurologic: CNII-XII intact. No motor or sensory deficits.          _______________________________________________________________________  Medications Reviewed:  DISCHARGE MEDICATIONS:   Current Discharge Medication List      START taking these medications    Details   acetaminophen (TYLENOL) 325 mg tablet Take 2 Tablets by mouth every six (6) hours as needed for Pain or Fever. Qty: 30 Tablet, Refills: 0  Start date: 3/6/2022      aspirin delayed-release 81 mg tablet Take 1 Tablet by mouth daily for 30 days. Qty: 30 Tablet, Refills: 0  Start date: 3/7/2022, End date: 4/6/2022      carvediloL (COREG) 3.125 mg tablet Take 1 Tablet by mouth two (2) times daily (with meals) for 30 days. Indications: high blood pressure  Qty: 60 Tablet, Refills: 0  Start date: 3/6/2022, End date: 4/5/2022      furosemide (LASIX) 40 mg tablet Take 1 Tablet by mouth daily for 30 days. Qty: 30 Tablet, Refills: 0  Start date: 3/6/2022, End date: 4/5/2022      guaiFENesin ER (MUCINEX) 600 mg ER tablet Take 1 Tablet by mouth every twelve (12) hours for 5 days. Qty: 10 Tablet, Refills: 0  Start date: 3/6/2022, End date: 3/11/2022      pravastatin (PRAVACHOL) 20 mg tablet Take 1 Tablet by mouth nightly for 30 days. Indications: excessive fat in the blood  Qty: 30 Tablet, Refills: 0  Start date: 3/6/2022, End date: 4/5/2022         CONTINUE these medications which have NOT CHANGED    Details   tamsulosin (FLOMAX) 0.4 mg capsule Take 2 Capsules by mouth daily. clopidogreL (PLAVIX) 75 mg tab Take 75 mg by mouth daily.       lisinopriL (PRINIVIL, ZESTRIL) 20 mg tablet Take 20 mg by mouth daily. omeprazole (PRILOSEC) 20 mg capsule Take 20 mg by mouth daily. multivitamin (ONE A DAY) tablet Take 1 Tablet by mouth daily. traZODone (DESYREL) 50 mg tablet Take 1 Tablet by mouth nightly as needed. STOP taking these medications       amLODIPine (NORVASC) 10 mg tablet Comments:   Reason for Stopping:         pantoprazole (PROTONIX) 40 mg tablet Comments:   Reason for Stopping:                 Recommended diet:  Cardiac Diet, no added salt and moderate fluid intake <2 L daily. Recommended activity:Activity as tolerated       Follow Up:  1- Dr. Marito Yi MD , set up an appointment to see them in 7-10 days. 2-and  Pulmonary/Intensive care Dr Charlene العراقي 22-3 weeks  3- cardiology Dr Laurent Chapa 2-4 weeks. _______________________________________________________________________  DISPOSITION:    Home with Family:    Home with HH/PT/OT/RN: x   SNF/LTC:    MANUEL:    OTHER:    ________________________________________________________________________    Total time spent in discharge (min): 40   ________________________________________________________________________    Care Plan discussed with:    Comments   Patient x    Family      RN x    Care Manager x                   Consultant:  x    ________________________________________________________________________  Attending Physician: Alexsander Alvarado, MD  ________________________________________________________________________  **Lab Data Reviewed:  No results found for this or any previous visit (from the past 24 hour(s)).

## 2022-03-09 ENCOUNTER — PATIENT OUTREACH (OUTPATIENT)
Dept: CASE MANAGEMENT | Age: 82
End: 2022-03-09

## 2022-03-10 NOTE — PROGRESS NOTES
Care Transitions Outreach Attempt    Call within 2 business days of discharge: Yes   Attempted to reach patient for transitions of care follow up. Unable to reach patient. Left VM asking for return call. CTN will make 3rd attempt to reach patient to complete post hospital discharge assessment in 2-3 business days. Patient: Cyn Pineda Patient : 1940 MRN: [id number]    Last Discharge [address]       Complaint Diagnosis Description Type Department Provider    3/2/22 Shortness of Breath Acute on chronic combined systolic and diastolic congestive heart failure [facility]) ED to Hosp-Admission (Discharged) (ADMIT) [id number] Linda Velasquez MD; Jayne Urrutia. .. Was this an external facility discharge? No      Noted following upcoming appointments from discharge chart review:   [facility] follow up appointment(s): No future appointments.   Non-[facility] follow up appointment(s):   PCP- Dr. Arianna Melara  Cardiology- Dr. Emanuel Chavez- 3/30 at 1:30  Pulmonary- Dr. Julienne Garcia- 3/31 12:15

## 2022-03-15 ENCOUNTER — PATIENT OUTREACH (OUTPATIENT)
Dept: CASE MANAGEMENT | Age: 82
End: 2022-03-15

## 2022-03-18 PROBLEM — I50.9 CHF (CONGESTIVE HEART FAILURE) (HCC): Status: ACTIVE | Noted: 2021-11-15

## 2022-03-18 PROBLEM — K21.9 GERD (GASTROESOPHAGEAL REFLUX DISEASE): Status: ACTIVE | Noted: 2022-03-06

## 2022-03-19 PROBLEM — Z99.81 REQUIRES CONTINUOUS AT HOME SUPPLEMENTAL OXYGEN: Status: ACTIVE | Noted: 2022-03-06

## 2022-03-19 PROBLEM — I50.33 DIASTOLIC CHF, ACUTE ON CHRONIC (HCC): Status: ACTIVE | Noted: 2022-03-06

## 2022-03-19 PROBLEM — I10 HYPERTENSION: Status: ACTIVE | Noted: 2022-03-06

## 2022-03-19 PROBLEM — J18.9 PNA (PNEUMONIA): Status: ACTIVE | Noted: 2021-11-15

## 2022-03-19 PROBLEM — J96.01 ACUTE RESPIRATORY FAILURE WITH HYPOXIA (HCC): Status: ACTIVE | Noted: 2022-03-02

## 2023-05-24 RX ORDER — OMEPRAZOLE 20 MG/1
20 CAPSULE, DELAYED RELEASE ORAL DAILY
COMMUNITY
Start: 2021-11-30

## 2023-05-24 RX ORDER — LISINOPRIL 20 MG/1
20 TABLET ORAL DAILY
COMMUNITY
Start: 2021-11-30

## 2023-05-24 RX ORDER — TRAZODONE HYDROCHLORIDE 50 MG/1
1 TABLET ORAL NIGHTLY PRN
COMMUNITY
Start: 2021-09-03

## 2023-05-24 RX ORDER — ACETAMINOPHEN 325 MG/1
650 TABLET ORAL EVERY 6 HOURS PRN
COMMUNITY
Start: 2022-03-06

## 2023-05-24 RX ORDER — CLOPIDOGREL BISULFATE 75 MG/1
75 TABLET ORAL DAILY
COMMUNITY
Start: 2021-11-30

## 2023-05-24 RX ORDER — TAMSULOSIN HYDROCHLORIDE 0.4 MG/1
2 CAPSULE ORAL DAILY
COMMUNITY
Start: 2022-02-01

## (undated) DEVICE — ENDO KIT 1: Brand: MEDLINE INDUSTRIES, INC.

## (undated) DEVICE — CANNULA NSL O2 AD 7 FT END-TIDAL CARBON DIOX VENTFLO